# Patient Record
Sex: FEMALE | Race: WHITE | NOT HISPANIC OR LATINO | Employment: UNEMPLOYED | URBAN - METROPOLITAN AREA
[De-identification: names, ages, dates, MRNs, and addresses within clinical notes are randomized per-mention and may not be internally consistent; named-entity substitution may affect disease eponyms.]

---

## 2017-02-02 ENCOUNTER — GENERIC CONVERSION - ENCOUNTER (OUTPATIENT)
Dept: OTHER | Facility: OTHER | Age: 22
End: 2017-02-02

## 2017-02-07 ENCOUNTER — ALLSCRIPTS OFFICE VISIT (OUTPATIENT)
Dept: OTHER | Facility: OTHER | Age: 22
End: 2017-02-07

## 2017-02-07 DIAGNOSIS — Z34.90 ENCOUNTER FOR SUPERVISION OF NORMAL PREGNANCY: ICD-10-CM

## 2017-02-08 ENCOUNTER — GENERIC CONVERSION - ENCOUNTER (OUTPATIENT)
Dept: OTHER | Facility: OTHER | Age: 22
End: 2017-02-08

## 2017-02-10 LAB
CHLAMYDIA TRACHOMATIS BY MOL. METHOD (HISTORICAL): NOT DETECTED
GC BY MOL. METHOD (HISTORICAL): NOT DETECTED

## 2017-02-19 ENCOUNTER — HOSPITAL ENCOUNTER (OUTPATIENT)
Facility: HOSPITAL | Age: 22
Discharge: HOME | End: 2017-02-19
Attending: OBSTETRICS & GYNECOLOGY | Admitting: OBSTETRICS & GYNECOLOGY
Payer: COMMERCIAL

## 2017-02-19 ENCOUNTER — HOSPITAL ENCOUNTER (EMERGENCY)
Facility: HOSPITAL | Age: 22
Discharge: HOME | End: 2017-02-19
Payer: COMMERCIAL

## 2017-02-19 VITALS
SYSTOLIC BLOOD PRESSURE: 133 MMHG | RESPIRATION RATE: 20 BRPM | TEMPERATURE: 98.6 F | OXYGEN SATURATION: 99 % | DIASTOLIC BLOOD PRESSURE: 93 MMHG | HEART RATE: 112 BPM

## 2017-02-19 VITALS
OXYGEN SATURATION: 100 % | RESPIRATION RATE: 16 BRPM | TEMPERATURE: 98.6 F | HEIGHT: 61 IN | HEART RATE: 97 BPM | WEIGHT: 235.67 LBS | DIASTOLIC BLOOD PRESSURE: 58 MMHG | BODY MASS INDEX: 44.5 KG/M2 | SYSTOLIC BLOOD PRESSURE: 120 MMHG

## 2017-02-19 DIAGNOSIS — O20.9 VAGINAL BLEEDING BEFORE 22 WEEKS GESTATION: Primary | ICD-10-CM

## 2017-02-19 LAB
ABO GROUP BLD: NORMAL
BASOPHILS # BLD AUTO: 0.02 THOUSANDS/ΜL (ref 0–0.1)
BASOPHILS NFR BLD AUTO: 0 % (ref 0–1)
EOSINOPHIL # BLD AUTO: 0.23 THOUSAND/ΜL (ref 0–0.61)
EOSINOPHIL NFR BLD AUTO: 2 % (ref 0–6)
ERYTHROCYTE [DISTWIDTH] IN BLOOD BY AUTOMATED COUNT: 14.8 % (ref 11.6–15.1)
HCT VFR BLD AUTO: 36.4 % (ref 34.8–46.1)
HGB BLD-MCNC: 12.1 G/DL (ref 11.5–15.4)
LYMPHOCYTES # BLD AUTO: 1.66 THOUSANDS/ΜL (ref 0.6–4.47)
LYMPHOCYTES NFR BLD AUTO: 15 % (ref 14–44)
MCH RBC QN AUTO: 25.6 PG (ref 26.8–34.3)
MCHC RBC AUTO-ENTMCNC: 33.2 G/DL (ref 31.4–37.4)
MCV RBC AUTO: 77 FL (ref 82–98)
MONOCYTES # BLD AUTO: 0.46 THOUSAND/ΜL (ref 0.17–1.22)
MONOCYTES NFR BLD AUTO: 4 % (ref 4–12)
NEUTROPHILS # BLD AUTO: 8.91 THOUSANDS/ΜL (ref 1.85–7.62)
NEUTS SEG NFR BLD AUTO: 79 % (ref 43–75)
NRBC BLD AUTO-RTO: 0 /100 WBCS
PLATELET # BLD AUTO: 302 THOUSANDS/UL (ref 149–390)
PMV BLD AUTO: 8.8 FL (ref 8.9–12.7)
RBC # BLD AUTO: 4.73 MILLION/UL (ref 3.81–5.12)
RH BLD: POSITIVE
WBC # BLD AUTO: 11.35 THOUSAND/UL (ref 4.31–10.16)

## 2017-02-19 PROCEDURE — 86900 BLOOD TYPING SEROLOGIC ABO: CPT | Performed by: PHYSICIAN ASSISTANT

## 2017-02-19 PROCEDURE — 85025 COMPLETE CBC W/AUTO DIFF WBC: CPT | Performed by: PHYSICIAN ASSISTANT

## 2017-02-19 PROCEDURE — 86901 BLOOD TYPING SEROLOGIC RH(D): CPT | Performed by: PHYSICIAN ASSISTANT

## 2017-02-19 PROCEDURE — 99214 OFFICE O/P EST MOD 30 MIN: CPT

## 2017-02-19 PROCEDURE — 96361 HYDRATE IV INFUSION ADD-ON: CPT

## 2017-02-19 PROCEDURE — 99284 EMERGENCY DEPT VISIT MOD MDM: CPT

## 2017-02-19 PROCEDURE — 36415 COLL VENOUS BLD VENIPUNCTURE: CPT | Performed by: PHYSICIAN ASSISTANT

## 2017-02-19 PROCEDURE — 96360 HYDRATION IV INFUSION INIT: CPT

## 2017-02-19 RX ORDER — FOLIC ACID 1 MG/1
1 TABLET ORAL DAILY
Status: ON HOLD | COMMUNITY
End: 2017-06-20

## 2017-02-19 RX ADMIN — SODIUM CHLORIDE 1000 ML: 0.9 INJECTION, SOLUTION INTRAVENOUS at 10:27

## 2017-02-21 ENCOUNTER — GENERIC CONVERSION - ENCOUNTER (OUTPATIENT)
Dept: OTHER | Facility: OTHER | Age: 22
End: 2017-02-21

## 2017-02-22 ENCOUNTER — ALLSCRIPTS OFFICE VISIT (OUTPATIENT)
Dept: PERINATAL CARE | Facility: CLINIC | Age: 22
End: 2017-02-22
Payer: COMMERCIAL

## 2017-02-22 ENCOUNTER — GENERIC CONVERSION - ENCOUNTER (OUTPATIENT)
Dept: OTHER | Facility: OTHER | Age: 22
End: 2017-02-22

## 2017-02-22 PROCEDURE — 76813 OB US NUCHAL MEAS 1 GEST: CPT | Performed by: OBSTETRICS & GYNECOLOGY

## 2017-02-22 PROCEDURE — 76801 OB US < 14 WKS SINGLE FETUS: CPT | Performed by: OBSTETRICS & GYNECOLOGY

## 2017-02-22 PROCEDURE — 76814 OB US NUCHAL MEAS ADD-ON: CPT | Performed by: OBSTETRICS & GYNECOLOGY

## 2017-02-22 PROCEDURE — 76802 OB US < 14 WKS ADDL FETUS: CPT | Performed by: OBSTETRICS & GYNECOLOGY

## 2017-03-07 ENCOUNTER — ALLSCRIPTS OFFICE VISIT (OUTPATIENT)
Dept: OTHER | Facility: OTHER | Age: 22
End: 2017-03-07

## 2017-03-09 ENCOUNTER — GENERIC CONVERSION - ENCOUNTER (OUTPATIENT)
Dept: OTHER | Facility: OTHER | Age: 22
End: 2017-03-09

## 2017-03-10 ENCOUNTER — GENERIC CONVERSION - ENCOUNTER (OUTPATIENT)
Dept: OTHER | Facility: OTHER | Age: 22
End: 2017-03-10

## 2017-03-13 ENCOUNTER — GENERIC CONVERSION - ENCOUNTER (OUTPATIENT)
Dept: OTHER | Facility: OTHER | Age: 22
End: 2017-03-13

## 2017-03-15 ENCOUNTER — GENERIC CONVERSION - ENCOUNTER (OUTPATIENT)
Dept: OTHER | Facility: OTHER | Age: 22
End: 2017-03-15

## 2017-03-17 ENCOUNTER — GENERIC CONVERSION - ENCOUNTER (OUTPATIENT)
Dept: OTHER | Facility: OTHER | Age: 22
End: 2017-03-17

## 2017-03-21 DIAGNOSIS — Z86.718 PERSONAL HISTORY OF OTHER VENOUS THROMBOSIS AND EMBOLISM: ICD-10-CM

## 2017-03-21 DIAGNOSIS — Z34.90 ENCOUNTER FOR SUPERVISION OF NORMAL PREGNANCY: ICD-10-CM

## 2017-03-21 DIAGNOSIS — O14.92 PRE-ECLAMPSIA IN SECOND TRIMESTER: ICD-10-CM

## 2017-03-22 ENCOUNTER — GENERIC CONVERSION - ENCOUNTER (OUTPATIENT)
Dept: OTHER | Facility: OTHER | Age: 22
End: 2017-03-22

## 2017-04-04 ENCOUNTER — ALLSCRIPTS OFFICE VISIT (OUTPATIENT)
Dept: OTHER | Facility: OTHER | Age: 22
End: 2017-04-04

## 2017-04-06 ENCOUNTER — LAB CONVERSION - ENCOUNTER (OUTPATIENT)
Dept: OTHER | Facility: OTHER | Age: 22
End: 2017-04-06

## 2017-04-06 LAB
BASOPHILS # BLD AUTO: 0.2 %
BASOPHILS # BLD AUTO: 22 CELLS/UL (ref 0–200)
CREATININE 24 HOUR UR (HISTORICAL): 2.25 G/24 H (ref 0.63–2.5)
DEPRECATED RDW RBC AUTO: 17 % (ref 11–15)
EOSINOPHIL # BLD AUTO: 1.7 %
EOSINOPHIL # BLD AUTO: 189 CELLS/UL (ref 15–500)
HCT VFR BLD AUTO: 33.4 % (ref 35–45)
HGB BLD-MCNC: 11.1 G/DL (ref 11.7–15.5)
LYMPHOCYTES # BLD AUTO: 17.6 %
LYMPHOCYTES # BLD AUTO: 1954 CELLS/UL (ref 850–3900)
MCH RBC QN AUTO: 26.2 PG (ref 27–33)
MCHC RBC AUTO-ENTMCNC: 33.3 G/DL (ref 32–36)
MCV RBC AUTO: 78.7 FL (ref 80–100)
MONOCYTES # BLD AUTO: 366 CELLS/UL (ref 200–950)
MONOCYTES (HISTORICAL): 3.3 %
NEUTROPHILS # BLD AUTO: 77.2 %
NEUTROPHILS # BLD AUTO: 8569 CELLS/UL (ref 1500–7800)
PLATELET # BLD AUTO: 342 THOUSAND/UL (ref 140–400)
PMV BLD AUTO: 7.5 FL (ref 7.5–12.5)
PROT/CREAT UR: 100 MG/G CREAT
PROTEIN 24 HOUR URINE (HISTORICAL): 225 MG/24 H
RBC # BLD AUTO: 4.24 MILLION/UL (ref 3.8–5.1)
WBC # BLD AUTO: 11.1 THOUSAND/UL (ref 3.8–10.8)

## 2017-04-07 ENCOUNTER — LAB CONVERSION - ENCOUNTER (OUTPATIENT)
Dept: OTHER | Facility: OTHER | Age: 22
End: 2017-04-07

## 2017-04-07 LAB
A/G RATIO (HISTORICAL): 1 (CALC) (ref 1–2.5)
AB SCRN, RBC W/RFLX ID,TITER,AG (HISTORICAL): ABNORMAL
ABO GROUP BLD: ABNORMAL
ALBUMIN SERPL BCP-MCNC: 3.1 G/DL (ref 3.6–5.1)
ALP SERPL-CCNC: 94 U/L (ref 33–115)
ALT SERPL W P-5'-P-CCNC: 6 U/L (ref 6–29)
AST SERPL W P-5'-P-CCNC: 9 U/L (ref 10–30)
BASOPHILS # BLD AUTO: 0.2 %
BASOPHILS # BLD AUTO: 22 CELLS/UL (ref 0–200)
BILIRUB SERPL-MCNC: 0.2 MG/DL (ref 0.2–1.2)
BUN SERPL-MCNC: 7 MG/DL (ref 7–25)
BUN/CREA RATIO (HISTORICAL): 18 (CALC) (ref 6–22)
CALCIUM SERPL-MCNC: 8.5 MG/DL (ref 8.6–10.2)
CHLORIDE SERPL-SCNC: 105 MMOL/L (ref 98–110)
CO2 SERPL-SCNC: 24 MMOL/L (ref 20–31)
CREAT SERPL-MCNC: 0.38 MG/DL (ref 0.5–1.1)
DEPRECATED RDW RBC AUTO: 17 % (ref 11–15)
EGFR AFRICAN AMERICAN (HISTORICAL): 177 ML/MIN/1.73M2
EGFR-AMERICAN CALC (HISTORICAL): 152 ML/MIN/1.73M2
EOSINOPHIL # BLD AUTO: 1.7 %
EOSINOPHIL # BLD AUTO: 189 CELLS/UL (ref 15–500)
GAMMA GLOBULIN (HISTORICAL): 3 G/DL (CALC) (ref 1.9–3.7)
GLUCOSE (HISTORICAL): 83 MG/DL (ref 65–99)
HBA1C MFR BLD HPLC: 4.8 % OF TOTAL HGB
HCT VFR BLD AUTO: 33.4 % (ref 35–45)
HEPATITIS B SURFACE ANTIGEN (HISTORICAL): ABNORMAL
HGB BLD-MCNC: 11.1 G/DL (ref 11.7–15.5)
LYMPHOCYTES # BLD AUTO: 17.6 %
LYMPHOCYTES # BLD AUTO: 1954 CELLS/UL (ref 850–3900)
MCH RBC QN AUTO: 26.2 PG (ref 27–33)
MCHC RBC AUTO-ENTMCNC: 33.3 G/DL (ref 32–36)
MCV RBC AUTO: 78.7 FL (ref 80–100)
MONOCYTES # BLD AUTO: 366 CELLS/UL (ref 200–950)
MONOCYTES (HISTORICAL): 3.3 %
NEUTROPHILS # BLD AUTO: 77.2 %
NEUTROPHILS # BLD AUTO: 8569 CELLS/UL (ref 1500–7800)
PLATELET # BLD AUTO: 342 THOUSAND/UL (ref 140–400)
PMV BLD AUTO: 7.5 FL (ref 7.5–12.5)
POTASSIUM SERPL-SCNC: 3.7 MMOL/L (ref 3.5–5.3)
RBC # BLD AUTO: 4.24 MILLION/UL (ref 3.8–5.1)
RH BLD: ABNORMAL
RPR SCREEN (HISTORICAL): ABNORMAL
RUBELLA, IGG (HISTORICAL): 2.01 INDEX
SODIUM SERPL-SCNC: 137 MMOL/L (ref 135–146)
TOTAL PROTEIN (HISTORICAL): 6.1 G/DL (ref 6.1–8.1)
URIC ACID (HISTORICAL): 3.6 MG/DL (ref 2.5–7)
WBC # BLD AUTO: 11.1 THOUSAND/UL (ref 3.8–10.8)

## 2017-04-12 ENCOUNTER — GENERIC CONVERSION - ENCOUNTER (OUTPATIENT)
Dept: OTHER | Facility: OTHER | Age: 22
End: 2017-04-12

## 2017-04-13 ENCOUNTER — GENERIC CONVERSION - ENCOUNTER (OUTPATIENT)
Dept: OTHER | Facility: OTHER | Age: 22
End: 2017-04-13

## 2017-04-19 ENCOUNTER — GENERIC CONVERSION - ENCOUNTER (OUTPATIENT)
Dept: OTHER | Facility: OTHER | Age: 22
End: 2017-04-19

## 2017-04-19 ENCOUNTER — ALLSCRIPTS OFFICE VISIT (OUTPATIENT)
Dept: PERINATAL CARE | Facility: CLINIC | Age: 22
End: 2017-04-19
Payer: COMMERCIAL

## 2017-04-19 PROCEDURE — 76812 OB US DETAILED ADDL FETUS: CPT | Performed by: OBSTETRICS & GYNECOLOGY

## 2017-04-19 PROCEDURE — 76811 OB US DETAILED SNGL FETUS: CPT | Performed by: OBSTETRICS & GYNECOLOGY

## 2017-05-09 ENCOUNTER — GENERIC CONVERSION - ENCOUNTER (OUTPATIENT)
Dept: OTHER | Facility: OTHER | Age: 22
End: 2017-05-09

## 2017-05-23 ENCOUNTER — GENERIC CONVERSION - ENCOUNTER (OUTPATIENT)
Dept: OTHER | Facility: OTHER | Age: 22
End: 2017-05-23

## 2017-06-05 ENCOUNTER — ALLSCRIPTS OFFICE VISIT (OUTPATIENT)
Dept: OTHER | Facility: OTHER | Age: 22
End: 2017-06-05

## 2017-06-05 DIAGNOSIS — Z34.82 ENCOUNTER FOR SUPERVISION OF OTHER NORMAL PREGNANCY, SECOND TRIMESTER: ICD-10-CM

## 2017-06-13 ENCOUNTER — ALLSCRIPTS OFFICE VISIT (OUTPATIENT)
Dept: PERINATAL CARE | Facility: MEDICAL CENTER | Age: 22
End: 2017-06-13
Payer: COMMERCIAL

## 2017-06-13 ENCOUNTER — GENERIC CONVERSION - ENCOUNTER (OUTPATIENT)
Dept: OTHER | Facility: OTHER | Age: 22
End: 2017-06-13

## 2017-06-13 PROCEDURE — 96372 THER/PROPH/DIAG INJ SC/IM: CPT | Performed by: OBSTETRICS & GYNECOLOGY

## 2017-06-13 PROCEDURE — 76818 FETAL BIOPHYS PROFILE W/NST: CPT | Performed by: OBSTETRICS & GYNECOLOGY

## 2017-06-13 PROCEDURE — 76820 UMBILICAL ARTERY ECHO: CPT | Performed by: OBSTETRICS & GYNECOLOGY

## 2017-06-13 PROCEDURE — 76816 OB US FOLLOW-UP PER FETUS: CPT | Performed by: OBSTETRICS & GYNECOLOGY

## 2017-06-13 PROCEDURE — 76821 MIDDLE CEREBRAL ARTERY ECHO: CPT | Performed by: OBSTETRICS & GYNECOLOGY

## 2017-06-14 ENCOUNTER — GENERIC CONVERSION - ENCOUNTER (OUTPATIENT)
Dept: OTHER | Facility: OTHER | Age: 22
End: 2017-06-14

## 2017-06-14 ENCOUNTER — APPOINTMENT (OUTPATIENT)
Dept: PERINATAL CARE | Facility: CLINIC | Age: 22
End: 2017-06-14
Payer: COMMERCIAL

## 2017-06-14 ENCOUNTER — ALLSCRIPTS OFFICE VISIT (OUTPATIENT)
Dept: OTHER | Facility: OTHER | Age: 22
End: 2017-06-14

## 2017-06-14 PROCEDURE — 96372 THER/PROPH/DIAG INJ SC/IM: CPT | Performed by: OBSTETRICS & GYNECOLOGY

## 2017-06-16 ENCOUNTER — GENERIC CONVERSION - ENCOUNTER (OUTPATIENT)
Dept: OTHER | Facility: OTHER | Age: 22
End: 2017-06-16

## 2017-06-16 ENCOUNTER — ALLSCRIPTS OFFICE VISIT (OUTPATIENT)
Dept: PERINATAL CARE | Facility: MEDICAL CENTER | Age: 22
End: 2017-06-16
Payer: COMMERCIAL

## 2017-06-16 PROCEDURE — 76820 UMBILICAL ARTERY ECHO: CPT | Performed by: OBSTETRICS & GYNECOLOGY

## 2017-06-16 PROCEDURE — 76821 MIDDLE CEREBRAL ARTERY ECHO: CPT | Performed by: OBSTETRICS & GYNECOLOGY

## 2017-06-16 PROCEDURE — 76819 FETAL BIOPHYS PROFIL W/O NST: CPT | Performed by: OBSTETRICS & GYNECOLOGY

## 2017-06-20 ENCOUNTER — ALLSCRIPTS OFFICE VISIT (OUTPATIENT)
Dept: PERINATAL CARE | Facility: MEDICAL CENTER | Age: 22
DRG: 540 | End: 2017-06-20
Payer: COMMERCIAL

## 2017-06-20 ENCOUNTER — GENERIC CONVERSION - ENCOUNTER (OUTPATIENT)
Dept: OTHER | Facility: OTHER | Age: 22
End: 2017-06-20

## 2017-06-20 ENCOUNTER — HOSPITAL ENCOUNTER (INPATIENT)
Facility: HOSPITAL | Age: 22
LOS: 6 days | Discharge: LEFT BEFORE DISCHARGE | DRG: 540 | End: 2017-06-26
Attending: OBSTETRICS & GYNECOLOGY | Admitting: OBSTETRICS & GYNECOLOGY
Payer: COMMERCIAL

## 2017-06-20 DIAGNOSIS — O14.12 PREECLAMPSIA, SEVERE, SECOND TRIMESTER: ICD-10-CM

## 2017-06-20 DIAGNOSIS — Z3A.29 29 WEEKS GESTATION OF PREGNANCY: Primary | ICD-10-CM

## 2017-06-20 DIAGNOSIS — O16.9 ELEVATED BLOOD PRESSURE AFFECTING PREGNANCY, ANTEPARTUM: ICD-10-CM

## 2017-06-20 DIAGNOSIS — I10 CHRONIC HYPERTENSION: ICD-10-CM

## 2017-06-20 DIAGNOSIS — IMO0001 TWINS: ICD-10-CM

## 2017-06-20 LAB
ABO GROUP BLD: NORMAL
ALBUMIN SERPL BCP-MCNC: 1.7 G/DL (ref 3.5–5)
ALP SERPL-CCNC: 111 U/L (ref 46–116)
ALT SERPL W P-5'-P-CCNC: 10 U/L (ref 12–78)
ANION GAP SERPL CALCULATED.3IONS-SCNC: 10 MMOL/L (ref 4–13)
APTT PPP: 26 SECONDS (ref 23–35)
AST SERPL W P-5'-P-CCNC: 15 U/L (ref 5–45)
BACTERIA UR QL AUTO: ABNORMAL /HPF
BASOPHILS # BLD AUTO: 0.01 THOUSANDS/ΜL (ref 0–0.1)
BASOPHILS NFR BLD AUTO: 0 % (ref 0–1)
BILIRUB SERPL-MCNC: 0.15 MG/DL (ref 0.2–1)
BILIRUB UR QL STRIP: NEGATIVE
BLD GP AB SCN SERPL QL: NEGATIVE
BUN SERPL-MCNC: 8 MG/DL (ref 5–25)
CALCIUM SERPL-MCNC: 8.1 MG/DL (ref 8.3–10.1)
CHLORIDE SERPL-SCNC: 108 MMOL/L (ref 100–108)
CLARITY UR: CLEAR
CO2 SERPL-SCNC: 24 MMOL/L (ref 21–32)
COLOR UR: YELLOW
CREAT SERPL-MCNC: 0.43 MG/DL (ref 0.6–1.3)
CREAT UR-MCNC: 115 MG/DL
EOSINOPHIL # BLD AUTO: 0.17 THOUSAND/ΜL (ref 0–0.61)
EOSINOPHIL NFR BLD AUTO: 2 % (ref 0–6)
ERYTHROCYTE [DISTWIDTH] IN BLOOD BY AUTOMATED COUNT: 14.8 % (ref 11.6–15.1)
GFR SERPL CREATININE-BSD FRML MDRD: >60 ML/MIN/1.73SQ M
GLUCOSE SERPL-MCNC: 95 MG/DL (ref 65–140)
GLUCOSE UR STRIP-MCNC: NEGATIVE MG/DL
HCT VFR BLD AUTO: 31.7 % (ref 34.8–46.1)
HGB BLD-MCNC: 10.3 G/DL (ref 11.5–15.4)
HGB UR QL STRIP.AUTO: ABNORMAL
HYALINE CASTS #/AREA URNS LPF: ABNORMAL /LPF
INR PPP: 0.94 (ref 0.86–1.16)
KETONES UR STRIP-MCNC: NEGATIVE MG/DL
LEUKOCYTE ESTERASE UR QL STRIP: NEGATIVE
LYMPHOCYTES # BLD AUTO: 1.79 THOUSANDS/ΜL (ref 0.6–4.47)
LYMPHOCYTES NFR BLD AUTO: 16 % (ref 14–44)
MAGNESIUM SERPL-MCNC: 2.1 MG/DL (ref 1.6–2.6)
MCH RBC QN AUTO: 24.9 PG (ref 26.8–34.3)
MCHC RBC AUTO-ENTMCNC: 32.5 G/DL (ref 31.4–37.4)
MCV RBC AUTO: 77 FL (ref 82–98)
MONOCYTES # BLD AUTO: 0.59 THOUSAND/ΜL (ref 0.17–1.22)
MONOCYTES NFR BLD AUTO: 5 % (ref 4–12)
NEUTROPHILS # BLD AUTO: 8.53 THOUSANDS/ΜL (ref 1.85–7.62)
NEUTS SEG NFR BLD AUTO: 77 % (ref 43–75)
NITRITE UR QL STRIP: NEGATIVE
NON-SQ EPI CELLS URNS QL MICRO: ABNORMAL /HPF
NRBC BLD AUTO-RTO: 0 /100 WBCS
PH UR STRIP.AUTO: 6 [PH] (ref 4.5–8)
PLATELET # BLD AUTO: 280 THOUSANDS/UL (ref 149–390)
PMV BLD AUTO: 9.8 FL (ref 8.9–12.7)
POTASSIUM SERPL-SCNC: 3.5 MMOL/L (ref 3.5–5.3)
PROT SERPL-MCNC: 5.3 G/DL (ref 6.4–8.2)
PROT UR STRIP-MCNC: ABNORMAL MG/DL
PROT UR-MCNC: 823 MG/DL
PROT/CREAT UR: 7.16 MG/G{CREAT} (ref 0–0.1)
PROTHROMBIN TIME: 12.6 SECONDS (ref 12.1–14.4)
RBC # BLD AUTO: 4.13 MILLION/UL (ref 3.81–5.12)
RBC #/AREA URNS AUTO: ABNORMAL /HPF
RH BLD: POSITIVE
SODIUM SERPL-SCNC: 142 MMOL/L (ref 136–145)
SP GR UR STRIP.AUTO: 1.02 (ref 1–1.03)
SPECIMEN EXPIRATION DATE: NORMAL
URATE SERPL-MCNC: 5.9 MG/DL (ref 2–6.8)
UROBILINOGEN UR QL STRIP.AUTO: 0.2 E.U./DL
WBC # BLD AUTO: 11.18 THOUSAND/UL (ref 4.31–10.16)
WBC #/AREA URNS AUTO: ABNORMAL /HPF

## 2017-06-20 PROCEDURE — 85730 THROMBOPLASTIN TIME PARTIAL: CPT | Performed by: OBSTETRICS & GYNECOLOGY

## 2017-06-20 PROCEDURE — 76821 MIDDLE CEREBRAL ARTERY ECHO: CPT | Performed by: OBSTETRICS & GYNECOLOGY

## 2017-06-20 PROCEDURE — 81001 URINALYSIS AUTO W/SCOPE: CPT | Performed by: OBSTETRICS & GYNECOLOGY

## 2017-06-20 PROCEDURE — 86901 BLOOD TYPING SEROLOGIC RH(D): CPT | Performed by: OBSTETRICS & GYNECOLOGY

## 2017-06-20 PROCEDURE — 86850 RBC ANTIBODY SCREEN: CPT | Performed by: OBSTETRICS & GYNECOLOGY

## 2017-06-20 PROCEDURE — 86592 SYPHILIS TEST NON-TREP QUAL: CPT | Performed by: OBSTETRICS & GYNECOLOGY

## 2017-06-20 PROCEDURE — 76820 UMBILICAL ARTERY ECHO: CPT | Performed by: OBSTETRICS & GYNECOLOGY

## 2017-06-20 PROCEDURE — 76818 FETAL BIOPHYS PROFILE W/NST: CPT | Performed by: OBSTETRICS & GYNECOLOGY

## 2017-06-20 PROCEDURE — 99203 OFFICE O/P NEW LOW 30 MIN: CPT

## 2017-06-20 PROCEDURE — 86900 BLOOD TYPING SEROLOGIC ABO: CPT | Performed by: OBSTETRICS & GYNECOLOGY

## 2017-06-20 PROCEDURE — 84156 ASSAY OF PROTEIN URINE: CPT | Performed by: OBSTETRICS & GYNECOLOGY

## 2017-06-20 PROCEDURE — 82570 ASSAY OF URINE CREATININE: CPT | Performed by: OBSTETRICS & GYNECOLOGY

## 2017-06-20 PROCEDURE — 80053 COMPREHEN METABOLIC PANEL: CPT | Performed by: OBSTETRICS & GYNECOLOGY

## 2017-06-20 PROCEDURE — 84550 ASSAY OF BLOOD/URIC ACID: CPT | Performed by: OBSTETRICS & GYNECOLOGY

## 2017-06-20 PROCEDURE — 83735 ASSAY OF MAGNESIUM: CPT | Performed by: OBSTETRICS & GYNECOLOGY

## 2017-06-20 PROCEDURE — 85025 COMPLETE CBC W/AUTO DIFF WBC: CPT | Performed by: OBSTETRICS & GYNECOLOGY

## 2017-06-20 PROCEDURE — 85610 PROTHROMBIN TIME: CPT | Performed by: OBSTETRICS & GYNECOLOGY

## 2017-06-20 RX ORDER — LABETALOL HYDROCHLORIDE 5 MG/ML
20 INJECTION, SOLUTION INTRAVENOUS ONCE
Status: COMPLETED | OUTPATIENT
Start: 2017-06-20 | End: 2017-06-20

## 2017-06-20 RX ORDER — NIFEDIPINE 10 MG/1
20 CAPSULE ORAL ONCE
Status: DISCONTINUED | OUTPATIENT
Start: 2017-06-20 | End: 2017-06-20

## 2017-06-20 RX ORDER — SODIUM CHLORIDE, SODIUM LACTATE, POTASSIUM CHLORIDE, CALCIUM CHLORIDE 600; 310; 30; 20 MG/100ML; MG/100ML; MG/100ML; MG/100ML
25 INJECTION, SOLUTION INTRAVENOUS CONTINUOUS
Status: DISCONTINUED | OUTPATIENT
Start: 2017-06-20 | End: 2017-06-22

## 2017-06-20 RX ORDER — ONDANSETRON 2 MG/ML
4 INJECTION INTRAMUSCULAR; INTRAVENOUS EVERY 8 HOURS PRN
Status: DISCONTINUED | OUTPATIENT
Start: 2017-06-20 | End: 2017-06-20

## 2017-06-20 RX ORDER — LABETALOL 200 MG/1
200 TABLET, FILM COATED ORAL EVERY 12 HOURS SCHEDULED
Status: DISCONTINUED | OUTPATIENT
Start: 2017-06-20 | End: 2017-06-24 | Stop reason: SDUPTHER

## 2017-06-20 RX ORDER — HEPARIN SODIUM 5000 [USP'U]/ML
5000 INJECTION, SOLUTION INTRAVENOUS; SUBCUTANEOUS EVERY 12 HOURS SCHEDULED
Status: DISCONTINUED | OUTPATIENT
Start: 2017-06-20 | End: 2017-06-25

## 2017-06-20 RX ORDER — BETAMETHASONE SODIUM PHOSPHATE AND BETAMETHASONE ACETATE 3; 3 MG/ML; MG/ML
12 INJECTION, SUSPENSION INTRA-ARTICULAR; INTRALESIONAL; INTRAMUSCULAR; SOFT TISSUE EVERY 24 HOURS
Status: DISCONTINUED | OUTPATIENT
Start: 2017-06-20 | End: 2017-06-20

## 2017-06-20 RX ORDER — PNV 119/IRON FUM/FOLIC ACID 29 MG-1 MG
1 TABLET ORAL DAILY
COMMUNITY
Start: 2017-03-07 | End: 2019-08-14 | Stop reason: ALTCHOICE

## 2017-06-20 RX ORDER — FOLIC ACID 1 MG/1
1 TABLET ORAL DAILY
COMMUNITY
Start: 2017-02-07 | End: 2018-06-20 | Stop reason: ALTCHOICE

## 2017-06-20 RX ORDER — ASPIRIN 81 MG/1
81 TABLET, CHEWABLE ORAL DAILY
COMMUNITY
Start: 2017-03-07 | End: 2018-03-13 | Stop reason: SDUPTHER

## 2017-06-20 RX ORDER — LABETALOL HYDROCHLORIDE 5 MG/ML
INJECTION, SOLUTION INTRAVENOUS
Status: DISPENSED
Start: 2017-06-20 | End: 2017-06-21

## 2017-06-20 RX ORDER — LABETALOL HYDROCHLORIDE 5 MG/ML
40 INJECTION, SOLUTION INTRAVENOUS ONCE
Status: COMPLETED | OUTPATIENT
Start: 2017-06-20 | End: 2017-06-20

## 2017-06-20 RX ORDER — BETAMETHASONE SODIUM PHOSPHATE AND BETAMETHASONE ACETATE 3; 3 MG/ML; MG/ML
12 INJECTION, SUSPENSION INTRA-ARTICULAR; INTRALESIONAL; INTRAMUSCULAR; SOFT TISSUE EVERY 24 HOURS
Status: DISCONTINUED | OUTPATIENT
Start: 2017-06-21 | End: 2017-06-20

## 2017-06-20 RX ORDER — FERROUS SULFATE 325(65) MG
325 TABLET ORAL DAILY
COMMUNITY
Start: 2017-03-07 | End: 2018-06-20 | Stop reason: ALTCHOICE

## 2017-06-20 RX ORDER — ONDANSETRON 2 MG/ML
4 INJECTION INTRAMUSCULAR; INTRAVENOUS EVERY 6 HOURS PRN
Status: DISCONTINUED | OUTPATIENT
Start: 2017-06-20 | End: 2017-06-24

## 2017-06-20 RX ORDER — MAGNESIUM SULFATE IN WATER 40 MG/ML
6 INJECTION, SOLUTION INTRAVENOUS ONCE
Status: COMPLETED | OUTPATIENT
Start: 2017-06-20 | End: 2017-06-20

## 2017-06-20 RX ORDER — BETAMETHASONE SODIUM PHOSPHATE AND BETAMETHASONE ACETATE 3; 3 MG/ML; MG/ML
12 INJECTION, SUSPENSION INTRA-ARTICULAR; INTRALESIONAL; INTRAMUSCULAR; SOFT TISSUE EVERY 24 HOURS
Status: COMPLETED | OUTPATIENT
Start: 2017-06-20 | End: 2017-06-20

## 2017-06-20 RX ADMIN — LABETALOL HYDROCHLORIDE 20 MG: 5 INJECTION, SOLUTION INTRAVENOUS at 14:03

## 2017-06-20 RX ADMIN — LABETALOL HYDROCHLORIDE 40 MG: 5 INJECTION, SOLUTION INTRAVENOUS at 16:35

## 2017-06-20 RX ADMIN — BETAMETHASONE SODIUM PHOSPHATE AND BETAMETHASONE ACETATE 12 MG: 3; 3 INJECTION, SUSPENSION INTRA-ARTICULAR; INTRALESIONAL; INTRAMUSCULAR at 23:31

## 2017-06-20 RX ADMIN — LABETALOL HYDROCHLORIDE 40 MG: 5 INJECTION, SOLUTION INTRAVENOUS at 14:34

## 2017-06-20 RX ADMIN — LABETALOL HCL 200 MG: 200 TABLET, FILM COATED ORAL at 16:45

## 2017-06-20 RX ADMIN — SODIUM CHLORIDE, SODIUM LACTATE, POTASSIUM CHLORIDE, AND CALCIUM CHLORIDE 125 ML/HR: .6; .31; .03; .02 INJECTION, SOLUTION INTRAVENOUS at 14:20

## 2017-06-20 RX ADMIN — Medication 6 G: at 14:27

## 2017-06-20 RX ADMIN — MAGNESIUM SULFATE HEPTAHYDRATE 2 G/HR: 500 INJECTION, SOLUTION INTRAMUSCULAR; INTRAVENOUS at 15:37

## 2017-06-20 RX ADMIN — HEPARIN SODIUM 5000 UNITS: 5000 INJECTION, SOLUTION INTRAVENOUS; SUBCUTANEOUS at 23:30

## 2017-06-20 RX ADMIN — LABETALOL HYDROCHLORIDE 20 MG: 5 INJECTION, SOLUTION INTRAVENOUS at 16:07

## 2017-06-21 LAB
ALBUMIN SERPL BCP-MCNC: 1.7 G/DL (ref 3.5–5)
ALBUMIN SERPL BCP-MCNC: 1.8 G/DL (ref 3.5–5)
ALP SERPL-CCNC: 112 U/L (ref 46–116)
ALP SERPL-CCNC: 121 U/L (ref 46–116)
ALT SERPL W P-5'-P-CCNC: 8 U/L (ref 12–78)
ALT SERPL W P-5'-P-CCNC: 9 U/L (ref 12–78)
ANION GAP SERPL CALCULATED.3IONS-SCNC: 10 MMOL/L (ref 4–13)
ANION GAP SERPL CALCULATED.3IONS-SCNC: 12 MMOL/L (ref 4–13)
ANISOCYTOSIS BLD QL SMEAR: PRESENT
AST SERPL W P-5'-P-CCNC: 11 U/L (ref 5–45)
AST SERPL W P-5'-P-CCNC: 13 U/L (ref 5–45)
BASOPHILS # BLD AUTO: 0 THOUSANDS/ΜL (ref 0–0.1)
BASOPHILS # BLD AUTO: 0.01 THOUSANDS/ΜL (ref 0–0.1)
BASOPHILS # BLD MANUAL: 0.13 THOUSAND/UL (ref 0–0.1)
BASOPHILS NFR BLD AUTO: 0 % (ref 0–1)
BASOPHILS NFR BLD AUTO: 0 % (ref 0–1)
BASOPHILS NFR MAR MANUAL: 1 % (ref 0–1)
BILIRUB SERPL-MCNC: 0.21 MG/DL (ref 0.2–1)
BILIRUB SERPL-MCNC: 0.3 MG/DL (ref 0.2–1)
BUN SERPL-MCNC: 7 MG/DL (ref 5–25)
BUN SERPL-MCNC: 8 MG/DL (ref 5–25)
CALCIUM SERPL-MCNC: 7.3 MG/DL (ref 8.3–10.1)
CALCIUM SERPL-MCNC: 7.7 MG/DL (ref 8.3–10.1)
CHLORIDE SERPL-SCNC: 104 MMOL/L (ref 100–108)
CHLORIDE SERPL-SCNC: 104 MMOL/L (ref 100–108)
CO2 SERPL-SCNC: 22 MMOL/L (ref 21–32)
CO2 SERPL-SCNC: 24 MMOL/L (ref 21–32)
CREAT SERPL-MCNC: 0.52 MG/DL (ref 0.6–1.3)
CREAT SERPL-MCNC: 0.57 MG/DL (ref 0.6–1.3)
DEPRECATED AT III PPP: 99 % OF NORMAL (ref 92–136)
EOSINOPHIL # BLD AUTO: 0 THOUSAND/ΜL (ref 0–0.61)
EOSINOPHIL # BLD AUTO: 0 THOUSAND/ΜL (ref 0–0.61)
EOSINOPHIL # BLD MANUAL: 0 THOUSAND/UL (ref 0–0.4)
EOSINOPHIL NFR BLD AUTO: 0 % (ref 0–6)
EOSINOPHIL NFR BLD AUTO: 0 % (ref 0–6)
EOSINOPHIL NFR BLD MANUAL: 0 % (ref 0–6)
ERYTHROCYTE [DISTWIDTH] IN BLOOD BY AUTOMATED COUNT: 15.1 % (ref 11.6–15.1)
ERYTHROCYTE [DISTWIDTH] IN BLOOD BY AUTOMATED COUNT: 15.2 % (ref 11.6–15.1)
ERYTHROCYTE [DISTWIDTH] IN BLOOD BY AUTOMATED COUNT: 15.3 % (ref 11.6–15.1)
EST. AVERAGE GLUCOSE BLD GHB EST-MCNC: 97 MG/DL
GFR SERPL CREATININE-BSD FRML MDRD: >60 ML/MIN/1.73SQ M
GFR SERPL CREATININE-BSD FRML MDRD: >60 ML/MIN/1.73SQ M
GLUCOSE SERPL-MCNC: 103 MG/DL (ref 65–140)
GLUCOSE SERPL-MCNC: 124 MG/DL (ref 65–140)
HBA1C MFR BLD: 5 % (ref 4.2–6.3)
HCT VFR BLD AUTO: 30.4 % (ref 34.8–46.1)
HCT VFR BLD AUTO: 32.3 % (ref 34.8–46.1)
HCT VFR BLD AUTO: 34.1 % (ref 34.8–46.1)
HGB BLD-MCNC: 10.4 G/DL (ref 11.5–15.4)
HGB BLD-MCNC: 11 G/DL (ref 11.5–15.4)
HGB BLD-MCNC: 9.9 G/DL (ref 11.5–15.4)
LYMPHOCYTES # BLD AUTO: 0.39 THOUSAND/UL (ref 0.6–4.47)
LYMPHOCYTES # BLD AUTO: 1.05 THOUSANDS/ΜL (ref 0.6–4.47)
LYMPHOCYTES # BLD AUTO: 1.34 THOUSANDS/ΜL (ref 0.6–4.47)
LYMPHOCYTES # BLD AUTO: 3 % (ref 14–44)
LYMPHOCYTES NFR BLD AUTO: 10 % (ref 14–44)
LYMPHOCYTES NFR BLD AUTO: 11 % (ref 14–44)
MCH RBC QN AUTO: 24.7 PG (ref 26.8–34.3)
MCH RBC QN AUTO: 24.9 PG (ref 26.8–34.3)
MCH RBC QN AUTO: 24.9 PG (ref 26.8–34.3)
MCHC RBC AUTO-ENTMCNC: 32.2 G/DL (ref 31.4–37.4)
MCHC RBC AUTO-ENTMCNC: 32.3 G/DL (ref 31.4–37.4)
MCHC RBC AUTO-ENTMCNC: 32.6 G/DL (ref 31.4–37.4)
MCV RBC AUTO: 77 FL (ref 82–98)
MONOCYTES # BLD AUTO: 0 THOUSAND/UL (ref 0–1.22)
MONOCYTES # BLD AUTO: 0.13 THOUSAND/ΜL (ref 0.17–1.22)
MONOCYTES # BLD AUTO: 0.53 THOUSAND/ΜL (ref 0.17–1.22)
MONOCYTES NFR BLD AUTO: 1 % (ref 4–12)
MONOCYTES NFR BLD AUTO: 5 % (ref 4–12)
MONOCYTES NFR BLD: 0 % (ref 4–12)
MYELOCYTES NFR BLD MANUAL: 1 % (ref 0–1)
NEUTROPHILS # BLD AUTO: 9.54 THOUSANDS/ΜL (ref 1.85–7.62)
NEUTROPHILS # BLD AUTO: 9.84 THOUSANDS/ΜL (ref 1.85–7.62)
NEUTROPHILS # BLD MANUAL: 12.45 THOUSAND/UL (ref 1.85–7.62)
NEUTS SEG NFR BLD AUTO: 84 % (ref 43–75)
NEUTS SEG NFR BLD AUTO: 89 % (ref 43–75)
NEUTS SEG NFR BLD AUTO: 95 % (ref 43–75)
NRBC BLD AUTO-RTO: 0 /100 WBCS
OVALOCYTES BLD QL SMEAR: PRESENT
PLATELET # BLD AUTO: 314 THOUSANDS/UL (ref 149–390)
PLATELET # BLD AUTO: 325 THOUSANDS/UL (ref 149–390)
PLATELET # BLD AUTO: 325 THOUSANDS/UL (ref 149–390)
PLATELET BLD QL SMEAR: ADEQUATE
PMV BLD AUTO: 10.2 FL (ref 8.9–12.7)
PMV BLD AUTO: 9.7 FL (ref 8.9–12.7)
PMV BLD AUTO: 9.9 FL (ref 8.9–12.7)
POIKILOCYTOSIS BLD QL SMEAR: PRESENT
POTASSIUM SERPL-SCNC: 3.7 MMOL/L (ref 3.5–5.3)
POTASSIUM SERPL-SCNC: 3.9 MMOL/L (ref 3.5–5.3)
PROT SERPL-MCNC: 5.5 G/DL (ref 6.4–8.2)
PROT SERPL-MCNC: 5.9 G/DL (ref 6.4–8.2)
RBC # BLD AUTO: 3.97 MILLION/UL (ref 3.81–5.12)
RBC # BLD AUTO: 4.21 MILLION/UL (ref 3.81–5.12)
RBC # BLD AUTO: 4.42 MILLION/UL (ref 3.81–5.12)
RBC MORPH BLD: PRESENT
RPR SER QL: NORMAL
SODIUM SERPL-SCNC: 138 MMOL/L (ref 136–145)
SODIUM SERPL-SCNC: 138 MMOL/L (ref 136–145)
WBC # BLD AUTO: 10.78 THOUSAND/UL (ref 4.31–10.16)
WBC # BLD AUTO: 11.82 THOUSAND/UL (ref 4.31–10.16)
WBC # BLD AUTO: 13.11 THOUSAND/UL (ref 4.31–10.16)

## 2017-06-21 PROCEDURE — 81240 F2 GENE: CPT | Performed by: OBSTETRICS & GYNECOLOGY

## 2017-06-21 PROCEDURE — 85306 CLOT INHIBIT PROT S FREE: CPT | Performed by: OBSTETRICS & GYNECOLOGY

## 2017-06-21 PROCEDURE — 85007 BL SMEAR W/DIFF WBC COUNT: CPT | Performed by: OBSTETRICS & GYNECOLOGY

## 2017-06-21 PROCEDURE — 85025 COMPLETE CBC W/AUTO DIFF WBC: CPT | Performed by: OBSTETRICS & GYNECOLOGY

## 2017-06-21 PROCEDURE — 83036 HEMOGLOBIN GLYCOSYLATED A1C: CPT | Performed by: OBSTETRICS & GYNECOLOGY

## 2017-06-21 PROCEDURE — 85300 ANTITHROMBIN III ACTIVITY: CPT | Performed by: OBSTETRICS & GYNECOLOGY

## 2017-06-21 PROCEDURE — 80053 COMPREHEN METABOLIC PANEL: CPT | Performed by: OBSTETRICS & GYNECOLOGY

## 2017-06-21 PROCEDURE — 85027 COMPLETE CBC AUTOMATED: CPT | Performed by: OBSTETRICS & GYNECOLOGY

## 2017-06-21 PROCEDURE — 85705 THROMBOPLASTIN INHIBITION: CPT | Performed by: OBSTETRICS & GYNECOLOGY

## 2017-06-21 PROCEDURE — 85732 THROMBOPLASTIN TIME PARTIAL: CPT | Performed by: OBSTETRICS & GYNECOLOGY

## 2017-06-21 PROCEDURE — 81241 F5 GENE: CPT | Performed by: OBSTETRICS & GYNECOLOGY

## 2017-06-21 PROCEDURE — 85613 RUSSELL VIPER VENOM DILUTED: CPT | Performed by: OBSTETRICS & GYNECOLOGY

## 2017-06-21 PROCEDURE — 86146 BETA-2 GLYCOPROTEIN ANTIBODY: CPT | Performed by: OBSTETRICS & GYNECOLOGY

## 2017-06-21 PROCEDURE — 86147 CARDIOLIPIN ANTIBODY EA IG: CPT | Performed by: OBSTETRICS & GYNECOLOGY

## 2017-06-21 PROCEDURE — 85305 CLOT INHIBIT PROT S TOTAL: CPT | Performed by: OBSTETRICS & GYNECOLOGY

## 2017-06-21 PROCEDURE — 85670 THROMBIN TIME PLASMA: CPT | Performed by: OBSTETRICS & GYNECOLOGY

## 2017-06-21 PROCEDURE — 85303 CLOT INHIBIT PROT C ACTIVITY: CPT | Performed by: OBSTETRICS & GYNECOLOGY

## 2017-06-21 RX ORDER — DIPHENHYDRAMINE HCL 25 MG
50 TABLET ORAL
Status: DISCONTINUED | OUTPATIENT
Start: 2017-06-21 | End: 2017-06-26 | Stop reason: HOSPADM

## 2017-06-21 RX ORDER — ACETAMINOPHEN 325 MG/1
650 TABLET ORAL EVERY 6 HOURS PRN
Status: DISCONTINUED | OUTPATIENT
Start: 2017-06-21 | End: 2017-06-26 | Stop reason: HOSPADM

## 2017-06-21 RX ORDER — DIPHENHYDRAMINE HCL 25 MG
TABLET ORAL
Status: COMPLETED
Start: 2017-06-21 | End: 2017-06-21

## 2017-06-21 RX ORDER — FERROUS SULFATE 325(65) MG
325 TABLET ORAL
Status: DISCONTINUED | OUTPATIENT
Start: 2017-06-22 | End: 2017-06-26 | Stop reason: HOSPADM

## 2017-06-21 RX ORDER — DOCUSATE SODIUM 100 MG/1
100 CAPSULE, LIQUID FILLED ORAL 2 TIMES DAILY
Status: DISCONTINUED | OUTPATIENT
Start: 2017-06-21 | End: 2017-06-26 | Stop reason: HOSPADM

## 2017-06-21 RX ORDER — BETAMETHASONE SODIUM PHOSPHATE AND BETAMETHASONE ACETATE 3; 3 MG/ML; MG/ML
12 INJECTION, SUSPENSION INTRA-ARTICULAR; INTRALESIONAL; INTRAMUSCULAR; SOFT TISSUE ONCE
Status: COMPLETED | OUTPATIENT
Start: 2017-06-21 | End: 2017-06-21

## 2017-06-21 RX ADMIN — MAGNESIUM SULFATE HEPTAHYDRATE 2 G/HR: 500 INJECTION, SOLUTION INTRAMUSCULAR; INTRAVENOUS at 12:49

## 2017-06-21 RX ADMIN — DOCUSATE SODIUM 100 MG: 100 CAPSULE, LIQUID FILLED ORAL at 22:28

## 2017-06-21 RX ADMIN — HEPARIN SODIUM 5000 UNITS: 5000 INJECTION, SOLUTION INTRAVENOUS; SUBCUTANEOUS at 22:29

## 2017-06-21 RX ADMIN — BETAMETHASONE SODIUM PHOSPHATE AND BETAMETHASONE ACETATE 12 MG: 3; 3 INJECTION, SUSPENSION INTRA-ARTICULAR; INTRALESIONAL; INTRAMUSCULAR at 22:29

## 2017-06-21 RX ADMIN — HEPARIN SODIUM 5000 UNITS: 5000 INJECTION, SOLUTION INTRAVENOUS; SUBCUTANEOUS at 09:36

## 2017-06-21 RX ADMIN — DIPHENHYDRAMINE HCL 50 MG: 25 TABLET ORAL at 00:36

## 2017-06-21 RX ADMIN — ACETAMINOPHEN 650 MG: 325 TABLET, FILM COATED ORAL at 03:20

## 2017-06-21 RX ADMIN — LABETALOL HCL 200 MG: 200 TABLET, FILM COATED ORAL at 22:29

## 2017-06-21 RX ADMIN — LABETALOL HCL 200 MG: 200 TABLET, FILM COATED ORAL at 09:32

## 2017-06-21 RX ADMIN — Medication 50 MG: at 00:36

## 2017-06-22 ENCOUNTER — GENERIC CONVERSION - ENCOUNTER (OUTPATIENT)
Dept: OTHER | Facility: OTHER | Age: 22
End: 2017-06-22

## 2017-06-22 ENCOUNTER — ANESTHESIA EVENT (OUTPATIENT)
Dept: OTHER | Facility: OTHER | Age: 22
End: 2017-06-22

## 2017-06-22 LAB
ALBUMIN SERPL BCP-MCNC: 1.8 G/DL (ref 3.5–5)
ALP SERPL-CCNC: 120 U/L (ref 46–116)
ALT SERPL W P-5'-P-CCNC: 9 U/L (ref 12–78)
ANION GAP SERPL CALCULATED.3IONS-SCNC: 8 MMOL/L (ref 4–13)
AST SERPL W P-5'-P-CCNC: 14 U/L (ref 5–45)
BASOPHILS # BLD AUTO: 0.01 THOUSANDS/ΜL (ref 0–0.1)
BASOPHILS NFR BLD AUTO: 0 % (ref 0–1)
BILIRUB SERPL-MCNC: 0.27 MG/DL (ref 0.2–1)
BUN SERPL-MCNC: 11 MG/DL (ref 5–25)
CALCIUM SERPL-MCNC: 7.7 MG/DL (ref 8.3–10.1)
CARDIOLIPIN IGA SER IA-ACNC: <9 APL U/ML (ref 0–11)
CARDIOLIPIN IGG SER IA-ACNC: <9 GPL U/ML (ref 0–14)
CARDIOLIPIN IGM SER IA-ACNC: <9 MPL U/ML (ref 0–12)
CHLORIDE SERPL-SCNC: 105 MMOL/L (ref 100–108)
CO2 SERPL-SCNC: 24 MMOL/L (ref 21–32)
CREAT SERPL-MCNC: 0.45 MG/DL (ref 0.6–1.3)
EOSINOPHIL # BLD AUTO: 0.01 THOUSAND/ΜL (ref 0–0.61)
EOSINOPHIL NFR BLD AUTO: 0 % (ref 0–6)
ERYTHROCYTE [DISTWIDTH] IN BLOOD BY AUTOMATED COUNT: 15.4 % (ref 11.6–15.1)
GFR SERPL CREATININE-BSD FRML MDRD: >60 ML/MIN/1.73SQ M
GLUCOSE SERPL-MCNC: 104 MG/DL (ref 65–140)
HCT VFR BLD AUTO: 31.8 % (ref 34.8–46.1)
HGB BLD-MCNC: 10.1 G/DL (ref 11.5–15.4)
LYMPHOCYTES # BLD AUTO: 1.06 THOUSANDS/ΜL (ref 0.6–4.47)
LYMPHOCYTES NFR BLD AUTO: 9 % (ref 14–44)
MCH RBC QN AUTO: 24.6 PG (ref 26.8–34.3)
MCHC RBC AUTO-ENTMCNC: 31.8 G/DL (ref 31.4–37.4)
MCV RBC AUTO: 78 FL (ref 82–98)
MONOCYTES # BLD AUTO: 0.19 THOUSAND/ΜL (ref 0.17–1.22)
MONOCYTES NFR BLD AUTO: 2 % (ref 4–12)
NEUTROPHILS # BLD AUTO: 10.77 THOUSANDS/ΜL (ref 1.85–7.62)
NEUTS SEG NFR BLD AUTO: 89 % (ref 43–75)
NRBC BLD AUTO-RTO: 0 /100 WBCS
PLATELET # BLD AUTO: 336 THOUSANDS/UL (ref 149–390)
PMV BLD AUTO: 9.9 FL (ref 8.9–12.7)
POTASSIUM SERPL-SCNC: 4.2 MMOL/L (ref 3.5–5.3)
PROT 24H UR-MCNC: 9002 MG/24 HRS (ref 40–150)
PROT C AG ACT/NOR PPP IA: 70 % OF NORMAL (ref 60–150)
PROT SERPL-MCNC: 5.9 G/DL (ref 6.4–8.2)
RBC # BLD AUTO: 4.1 MILLION/UL (ref 3.81–5.12)
SODIUM SERPL-SCNC: 137 MMOL/L (ref 136–145)
SPECIMEN VOL UR: 1400 ML
WBC # BLD AUTO: 12.18 THOUSAND/UL (ref 4.31–10.16)

## 2017-06-22 PROCEDURE — 76821 MIDDLE CEREBRAL ARTERY ECHO: CPT | Performed by: OBSTETRICS & GYNECOLOGY

## 2017-06-22 PROCEDURE — 76827 ECHO EXAM OF FETAL HEART: CPT | Performed by: OBSTETRICS & GYNECOLOGY

## 2017-06-22 PROCEDURE — 76818 FETAL BIOPHYS PROFILE W/NST: CPT | Performed by: OBSTETRICS & GYNECOLOGY

## 2017-06-22 PROCEDURE — 84156 ASSAY OF PROTEIN URINE: CPT | Performed by: OBSTETRICS & GYNECOLOGY

## 2017-06-22 PROCEDURE — 85025 COMPLETE CBC W/AUTO DIFF WBC: CPT | Performed by: OBSTETRICS & GYNECOLOGY

## 2017-06-22 PROCEDURE — 80053 COMPREHEN METABOLIC PANEL: CPT | Performed by: OBSTETRICS & GYNECOLOGY

## 2017-06-22 PROCEDURE — 76820 UMBILICAL ARTERY ECHO: CPT | Performed by: OBSTETRICS & GYNECOLOGY

## 2017-06-22 RX ORDER — FOLIC ACID 1 MG/1
1 TABLET ORAL DAILY
Status: DISCONTINUED | OUTPATIENT
Start: 2017-06-22 | End: 2017-06-26 | Stop reason: HOSPADM

## 2017-06-22 RX ORDER — DIAPER,BRIEF,INFANT-TODD,DISP
EACH MISCELLANEOUS 4 TIMES DAILY PRN
Status: DISCONTINUED | OUTPATIENT
Start: 2017-06-22 | End: 2017-06-26 | Stop reason: HOSPADM

## 2017-06-22 RX ORDER — FERROUS SULFATE 325(65) MG
325 TABLET ORAL DAILY
Status: DISCONTINUED | OUTPATIENT
Start: 2017-06-22 | End: 2017-06-22

## 2017-06-22 RX ADMIN — LABETALOL HCL 200 MG: 200 TABLET, FILM COATED ORAL at 09:41

## 2017-06-22 RX ADMIN — FOLIC ACID 1 MG: 1 TABLET ORAL at 14:34

## 2017-06-22 RX ADMIN — DOCUSATE SODIUM 100 MG: 100 CAPSULE, LIQUID FILLED ORAL at 21:33

## 2017-06-22 RX ADMIN — HEPARIN SODIUM 5000 UNITS: 5000 INJECTION, SOLUTION INTRAVENOUS; SUBCUTANEOUS at 21:33

## 2017-06-22 RX ADMIN — DOCUSATE SODIUM 100 MG: 100 CAPSULE, LIQUID FILLED ORAL at 09:41

## 2017-06-22 RX ADMIN — Medication 1 TABLET: at 11:31

## 2017-06-22 RX ADMIN — Medication 325 MG: at 11:32

## 2017-06-22 RX ADMIN — HEPARIN SODIUM 5000 UNITS: 5000 INJECTION, SOLUTION INTRAVENOUS; SUBCUTANEOUS at 08:55

## 2017-06-22 RX ADMIN — Medication 325 MG: at 16:20

## 2017-06-22 RX ADMIN — HYDROCORTISONE: 1 CREAM TOPICAL at 21:34

## 2017-06-22 RX ADMIN — LABETALOL HCL 200 MG: 200 TABLET, FILM COATED ORAL at 21:34

## 2017-06-23 ENCOUNTER — ANESTHESIA EVENT (INPATIENT)
Dept: LABOR AND DELIVERY | Facility: HOSPITAL | Age: 22
DRG: 540 | End: 2017-06-23
Payer: COMMERCIAL

## 2017-06-23 LAB
ALBUMIN SERPL BCP-MCNC: 1.8 G/DL (ref 3.5–5)
ALP SERPL-CCNC: 103 U/L (ref 46–116)
ALT SERPL W P-5'-P-CCNC: 8 U/L (ref 12–78)
ANION GAP SERPL CALCULATED.3IONS-SCNC: 9 MMOL/L (ref 4–13)
ANISOCYTOSIS BLD QL SMEAR: PRESENT
AST SERPL W P-5'-P-CCNC: 14 U/L (ref 5–45)
B2 GLYCOPROT1 IGA SER-ACNC: <9 GPI IGA UNITS (ref 0–25)
B2 GLYCOPROT1 IGG SER-ACNC: <9 GPI IGG UNITS (ref 0–20)
B2 GLYCOPROT1 IGM SER-ACNC: <9 GPI IGM UNITS (ref 0–32)
BASOPHILS # BLD MANUAL: 0 THOUSAND/UL (ref 0–0.1)
BASOPHILS NFR MAR MANUAL: 0 % (ref 0–1)
BILIRUB SERPL-MCNC: 0.2 MG/DL (ref 0.2–1)
BUN SERPL-MCNC: 11 MG/DL (ref 5–25)
BURR CELLS BLD QL SMEAR: PRESENT
CALCIUM SERPL-MCNC: 8 MG/DL (ref 8.3–10.1)
CHLORIDE SERPL-SCNC: 106 MMOL/L (ref 100–108)
CO2 SERPL-SCNC: 25 MMOL/L (ref 21–32)
CREAT SERPL-MCNC: 0.41 MG/DL (ref 0.6–1.3)
EOSINOPHIL # BLD MANUAL: 0 THOUSAND/UL (ref 0–0.4)
EOSINOPHIL NFR BLD MANUAL: 0 % (ref 0–6)
ERYTHROCYTE [DISTWIDTH] IN BLOOD BY AUTOMATED COUNT: 15.5 % (ref 11.6–15.1)
GFR SERPL CREATININE-BSD FRML MDRD: >60 ML/MIN/1.73SQ M
GLUCOSE SERPL-MCNC: 86 MG/DL (ref 65–140)
HCT VFR BLD AUTO: 30.1 % (ref 34.8–46.1)
HGB BLD-MCNC: 9.4 G/DL (ref 11.5–15.4)
LYMPHOCYTES # BLD AUTO: 1.25 THOUSAND/UL (ref 0.6–4.47)
LYMPHOCYTES # BLD AUTO: 10 % (ref 14–44)
MCH RBC QN AUTO: 24.6 PG (ref 26.8–34.3)
MCHC RBC AUTO-ENTMCNC: 31.2 G/DL (ref 31.4–37.4)
MCV RBC AUTO: 79 FL (ref 82–98)
METAMYELOCYTES NFR BLD MANUAL: 2 % (ref 0–1)
MICROCYTES BLD QL AUTO: PRESENT
MONOCYTES # BLD AUTO: 0.5 THOUSAND/UL (ref 0–1.22)
MONOCYTES NFR BLD: 4 % (ref 4–12)
MYELOCYTES NFR BLD MANUAL: 4 % (ref 0–1)
NEUTROPHILS # BLD MANUAL: 10.02 THOUSAND/UL (ref 1.85–7.62)
NEUTS BAND NFR BLD MANUAL: 1 % (ref 0–8)
NEUTS SEG NFR BLD AUTO: 79 % (ref 43–75)
NRBC BLD AUTO-RTO: 0 /100 WBCS
PLATELET # BLD AUTO: 347 THOUSANDS/UL (ref 149–390)
PLATELET BLD QL SMEAR: ADEQUATE
PMV BLD AUTO: 9.7 FL (ref 8.9–12.7)
POIKILOCYTOSIS BLD QL SMEAR: PRESENT
POLYCHROMASIA BLD QL SMEAR: PRESENT
POTASSIUM SERPL-SCNC: 4.2 MMOL/L (ref 3.5–5.3)
PROT S ACT/NOR PPP: 35 % (ref 57–157)
PROT S ACT/NOR PPP: 64 % (ref 63–140)
PROT S PPP-ACNC: 59 % (ref 60–150)
PROT SERPL-MCNC: 5.4 G/DL (ref 6.4–8.2)
RBC # BLD AUTO: 3.82 MILLION/UL (ref 3.81–5.12)
RBC MORPH BLD: PRESENT
SODIUM SERPL-SCNC: 140 MMOL/L (ref 136–145)
WBC # BLD AUTO: 12.52 THOUSAND/UL (ref 4.31–10.16)

## 2017-06-23 PROCEDURE — 4A1HXCZ MONITORING OF PRODUCTS OF CONCEPTION, CARDIAC RATE, EXTERNAL APPROACH: ICD-10-PCS | Performed by: OBSTETRICS & GYNECOLOGY

## 2017-06-23 PROCEDURE — 86900 BLOOD TYPING SEROLOGIC ABO: CPT | Performed by: OBSTETRICS & GYNECOLOGY

## 2017-06-23 PROCEDURE — 82805 BLOOD GASES W/O2 SATURATION: CPT | Performed by: OBSTETRICS & GYNECOLOGY

## 2017-06-23 PROCEDURE — 86901 BLOOD TYPING SEROLOGIC RH(D): CPT | Performed by: OBSTETRICS & GYNECOLOGY

## 2017-06-23 PROCEDURE — 86850 RBC ANTIBODY SCREEN: CPT | Performed by: OBSTETRICS & GYNECOLOGY

## 2017-06-23 PROCEDURE — 80053 COMPREHEN METABOLIC PANEL: CPT | Performed by: OBSTETRICS & GYNECOLOGY

## 2017-06-23 PROCEDURE — 87086 URINE CULTURE/COLONY COUNT: CPT | Performed by: OBSTETRICS & GYNECOLOGY

## 2017-06-23 PROCEDURE — 85007 BL SMEAR W/DIFF WBC COUNT: CPT | Performed by: OBSTETRICS & GYNECOLOGY

## 2017-06-23 PROCEDURE — 85027 COMPLETE CBC AUTOMATED: CPT | Performed by: OBSTETRICS & GYNECOLOGY

## 2017-06-23 PROCEDURE — 88307 TISSUE EXAM BY PATHOLOGIST: CPT | Performed by: OBSTETRICS & GYNECOLOGY

## 2017-06-23 RX ORDER — CLINDAMYCIN PHOSPHATE 900 MG/50ML
900 INJECTION INTRAVENOUS ONCE
Status: DISCONTINUED | OUTPATIENT
Start: 2017-06-23 | End: 2017-06-24

## 2017-06-23 RX ORDER — DEXTROSE, SODIUM CHLORIDE, SODIUM LACTATE, POTASSIUM CHLORIDE, AND CALCIUM CHLORIDE 5; .6; .31; .03; .02 G/100ML; G/100ML; G/100ML; G/100ML; G/100ML
125 INJECTION, SOLUTION INTRAVENOUS CONTINUOUS
Status: DISCONTINUED | OUTPATIENT
Start: 2017-06-23 | End: 2017-06-24

## 2017-06-23 RX ADMIN — HEPARIN SODIUM 5000 UNITS: 5000 INJECTION, SOLUTION INTRAVENOUS; SUBCUTANEOUS at 09:38

## 2017-06-23 RX ADMIN — SUCCINYLCHOLINE CHLORIDE 200 MG: 20 INJECTION, SOLUTION INTRAMUSCULAR; INTRAVENOUS at 23:58

## 2017-06-23 RX ADMIN — LABETALOL HCL 200 MG: 200 TABLET, FILM COATED ORAL at 09:38

## 2017-06-23 RX ADMIN — DOCUSATE SODIUM 100 MG: 100 CAPSULE, LIQUID FILLED ORAL at 17:57

## 2017-06-23 RX ADMIN — LABETALOL HCL 200 MG: 200 TABLET, FILM COATED ORAL at 20:43

## 2017-06-23 RX ADMIN — PROPOFOL 200 MG: 10 INJECTION, EMULSION INTRAVENOUS at 23:58

## 2017-06-23 RX ADMIN — DOCUSATE SODIUM 100 MG: 100 CAPSULE, LIQUID FILLED ORAL at 07:58

## 2017-06-23 RX ADMIN — Medication 1 TABLET: at 09:38

## 2017-06-23 RX ADMIN — FOLIC ACID 1 MG: 1 TABLET ORAL at 09:38

## 2017-06-23 RX ADMIN — LIDOCAINE HYDROCHLORIDE 100 MG: 20 INJECTION, SOLUTION INTRAVENOUS at 23:58

## 2017-06-23 RX ADMIN — HEPARIN SODIUM 5000 UNITS: 5000 INJECTION, SOLUTION INTRAVENOUS; SUBCUTANEOUS at 20:42

## 2017-06-23 RX ADMIN — Medication 325 MG: at 17:57

## 2017-06-23 RX ADMIN — SODIUM CHLORIDE, SODIUM LACTATE, POTASSIUM CHLORIDE, AND CALCIUM CHLORIDE: .6; .31; .03; .02 INJECTION, SOLUTION INTRAVENOUS at 23:50

## 2017-06-23 RX ADMIN — Medication 325 MG: at 07:58

## 2017-06-24 PROBLEM — O11.9 CHRONIC HYPERTENSION WITH SUPERIMPOSED PREECLAMPSIA: Status: ACTIVE | Noted: 2017-06-24

## 2017-06-24 PROBLEM — Z98.891 STATUS POST REPEAT LOW TRANSVERSE CESAREAN SECTION: Status: ACTIVE | Noted: 2017-06-24

## 2017-06-24 PROBLEM — Z3A.29 29 WEEKS GESTATION OF PREGNANCY: Status: RESOLVED | Noted: 2017-06-20 | Resolved: 2017-06-24

## 2017-06-24 PROBLEM — E66.9 ADIPOSITY: Status: ACTIVE | Noted: 2017-06-24

## 2017-06-24 PROBLEM — M62.81 RIGHT-SIDED MUSCLE WEAKNESS: Status: ACTIVE | Noted: 2017-06-24

## 2017-06-24 PROBLEM — O36.5990 FETAL GROWTH RETARDATION, ANTEPARTUM: Status: ACTIVE | Noted: 2017-06-24

## 2017-06-24 PROBLEM — O09.30 LATE PRENATAL CARE AFFECTING PREGNANCY: Status: ACTIVE | Noted: 2017-06-24

## 2017-06-24 PROBLEM — Z86.718 HISTORY OF THROMBOEMBOLISM OF VEIN: Status: ACTIVE | Noted: 2017-06-24

## 2017-06-24 PROBLEM — F31.9 BIPOLAR AFFECTIVE DISORDER (HCC): Status: ACTIVE | Noted: 2017-06-24

## 2017-06-24 PROBLEM — I63.9 CEREBRAL INFARCTION (HCC): Status: ACTIVE | Noted: 2017-06-24

## 2017-06-24 PROBLEM — Z3A.30 30 WEEKS GESTATION OF PREGNANCY: Status: ACTIVE | Noted: 2017-06-24

## 2017-06-24 PROBLEM — F41.9 ANXIETY: Status: ACTIVE | Noted: 2017-06-24

## 2017-06-24 LAB
ABO GROUP BLD: NORMAL
ALBUMIN SERPL BCP-MCNC: 1.6 G/DL (ref 3.5–5)
ALP SERPL-CCNC: 98 U/L (ref 46–116)
ALT SERPL W P-5'-P-CCNC: 9 U/L (ref 12–78)
ANION GAP SERPL CALCULATED.3IONS-SCNC: 9 MMOL/L (ref 4–13)
APTT SCREEN TO CONFIRM RATIO: 1.09 RATIO (ref 0–1.4)
AST SERPL W P-5'-P-CCNC: 20 U/L (ref 5–45)
BACTERIA UR CULT: NORMAL
BASE EXCESS BLDCOA CALC-SCNC: 0.8 MMOL/L (ref 3–11)
BASE EXCESS BLDCOV CALC-SCNC: -15.6 MMOL/L (ref 1–9)
BASE EXCESS BLDCOV CALC-SCNC: 0.5 MMOL/L (ref 1–9)
BASOPHILS # BLD AUTO: 0.02 THOUSANDS/ΜL (ref 0–0.1)
BASOPHILS NFR BLD AUTO: 0 % (ref 0–1)
BILIRUB SERPL-MCNC: 0.21 MG/DL (ref 0.2–1)
BLD GP AB SCN SERPL QL: NEGATIVE
BUN SERPL-MCNC: 9 MG/DL (ref 5–25)
CALCIUM SERPL-MCNC: 8.2 MG/DL (ref 8.3–10.1)
CHLORIDE SERPL-SCNC: 105 MMOL/L (ref 100–108)
CO2 SERPL-SCNC: 25 MMOL/L (ref 21–32)
CONFIRM APTT/NORMAL: 37 SEC (ref 0–55)
CREAT SERPL-MCNC: 0.38 MG/DL (ref 0.6–1.3)
EOSINOPHIL # BLD AUTO: 0.02 THOUSAND/ΜL (ref 0–0.61)
EOSINOPHIL NFR BLD AUTO: 0 % (ref 0–6)
ERYTHROCYTE [DISTWIDTH] IN BLOOD BY AUTOMATED COUNT: 15.8 % (ref 11.6–15.1)
GFR SERPL CREATININE-BSD FRML MDRD: >60 ML/MIN/1.73SQ M
GLUCOSE SERPL-MCNC: 66 MG/DL (ref 65–140)
HBV SURFACE AG SER QL: NORMAL
HCO3 BLDCOA-SCNC: 26.9 MMOL/L (ref 17.3–27.3)
HCO3 BLDCOV-SCNC: 17.6 MMOL/L (ref 12.2–28.6)
HCO3 BLDCOV-SCNC: 26.6 MMOL/L (ref 12.2–28.6)
HCT VFR BLD AUTO: 31.1 % (ref 34.8–46.1)
HGB BLD-MCNC: 9.9 G/DL (ref 11.5–15.4)
LA PPP-IMP: NORMAL
LYMPHOCYTES # BLD AUTO: 2.62 THOUSANDS/ΜL (ref 0.6–4.47)
LYMPHOCYTES NFR BLD AUTO: 15 % (ref 14–44)
MCH RBC QN AUTO: 25.4 PG (ref 26.8–34.3)
MCHC RBC AUTO-ENTMCNC: 31.8 G/DL (ref 31.4–37.4)
MCV RBC AUTO: 80 FL (ref 82–98)
MONOCYTES # BLD AUTO: 1.31 THOUSAND/ΜL (ref 0.17–1.22)
MONOCYTES NFR BLD AUTO: 7 % (ref 4–12)
NEUTROPHILS # BLD AUTO: 13.75 THOUSANDS/ΜL (ref 1.85–7.62)
NEUTS SEG NFR BLD AUTO: 78 % (ref 43–75)
NRBC BLD AUTO-RTO: 0 /100 WBCS
O2 CT VFR BLDCOA CALC: 12 ML/DL
OXYHGB MFR BLDCOA: 74 %
OXYHGB MFR BLDCOV: 30.1 %
OXYHGB MFR BLDCOV: 87.9 %
PCO2 BLDCOA: 49.7 MM[HG] (ref 30–60)
PCO2 BLDCOV: 49 MM HG (ref 27–43)
PCO2 BLDCOV: 77.2 MM HG (ref 27–43)
PH BLDCOA: 7.35 [PH] (ref 7.23–7.43)
PH BLDCOV: 6.98 [PH] (ref 7.19–7.49)
PH BLDCOV: 7.35 [PH] (ref 7.19–7.49)
PLATELET # BLD AUTO: 254 THOUSANDS/UL (ref 149–390)
PMV BLD AUTO: 9.8 FL (ref 8.9–12.7)
PO2 BLDCOA: 31.6 MM HG (ref 5–25)
PO2 BLDCOV: 19.6 MM HG (ref 15–45)
PO2 BLDCOV: 46.1 MM HG (ref 15–45)
POTASSIUM SERPL-SCNC: 4.4 MMOL/L (ref 3.5–5.3)
PROT SERPL-MCNC: 4.9 G/DL (ref 6.4–8.2)
RBC # BLD AUTO: 3.9 MILLION/UL (ref 3.81–5.12)
RH BLD: POSITIVE
SAO2 % BLDCOV: 14.3 ML/DL
SAO2 % BLDCOV: 6.8 ML/DL
SCREEN APTT: 27.7 SEC (ref 0–43.6)
SCREEN DRVVT: 35.1 SEC (ref 0–47)
SODIUM SERPL-SCNC: 139 MMOL/L (ref 136–145)
SPECIMEN EXPIRATION DATE: NORMAL
THROMBIN TIME: 16.4 SEC (ref 0–20.9)
WBC # BLD AUTO: 17.95 THOUSAND/UL (ref 4.31–10.16)

## 2017-06-24 PROCEDURE — 85025 COMPLETE CBC W/AUTO DIFF WBC: CPT | Performed by: OBSTETRICS & GYNECOLOGY

## 2017-06-24 PROCEDURE — 87340 HEPATITIS B SURFACE AG IA: CPT | Performed by: OBSTETRICS & GYNECOLOGY

## 2017-06-24 PROCEDURE — 94760 N-INVAS EAR/PLS OXIMETRY 1: CPT

## 2017-06-24 PROCEDURE — 80053 COMPREHEN METABOLIC PANEL: CPT | Performed by: OBSTETRICS & GYNECOLOGY

## 2017-06-24 RX ORDER — LABETALOL HYDROCHLORIDE 5 MG/ML
INJECTION, SOLUTION INTRAVENOUS
Status: COMPLETED
Start: 2017-06-24 | End: 2017-06-24

## 2017-06-24 RX ORDER — DIAPER,BRIEF,INFANT-TODD,DISP
1 EACH MISCELLANEOUS DAILY PRN
Status: DISCONTINUED | OUTPATIENT
Start: 2017-06-24 | End: 2017-06-26 | Stop reason: HOSPADM

## 2017-06-24 RX ORDER — IBUPROFEN 600 MG/1
600 TABLET ORAL EVERY 6 HOURS PRN
Status: DISCONTINUED | OUTPATIENT
Start: 2017-06-24 | End: 2017-06-26 | Stop reason: HOSPADM

## 2017-06-24 RX ORDER — OXYCODONE HYDROCHLORIDE AND ACETAMINOPHEN 5; 325 MG/1; MG/1
1 TABLET ORAL EVERY 4 HOURS PRN
Status: DISCONTINUED | OUTPATIENT
Start: 2017-06-24 | End: 2017-06-26 | Stop reason: HOSPADM

## 2017-06-24 RX ORDER — METOCLOPRAMIDE HYDROCHLORIDE 5 MG/ML
10 INJECTION INTRAMUSCULAR; INTRAVENOUS ONCE AS NEEDED
Status: DISCONTINUED | OUTPATIENT
Start: 2017-06-24 | End: 2017-06-24

## 2017-06-24 RX ORDER — LABETALOL HYDROCHLORIDE 5 MG/ML
40 INJECTION, SOLUTION INTRAVENOUS ONCE
Status: COMPLETED | OUTPATIENT
Start: 2017-06-24 | End: 2017-06-24

## 2017-06-24 RX ORDER — SUCCINYLCHOLINE CHLORIDE 20 MG/ML
INJECTION INTRAMUSCULAR; INTRAVENOUS AS NEEDED
Status: DISCONTINUED | OUTPATIENT
Start: 2017-06-23 | End: 2017-06-24 | Stop reason: SURG

## 2017-06-24 RX ORDER — OXYTOCIN 10 [USP'U]/ML
INJECTION, SOLUTION INTRAMUSCULAR; INTRAVENOUS AS NEEDED
Status: DISCONTINUED | OUTPATIENT
Start: 2017-06-24 | End: 2017-06-24 | Stop reason: SURG

## 2017-06-24 RX ORDER — LABETALOL 200 MG/1
200 TABLET, FILM COATED ORAL EVERY 12 HOURS SCHEDULED
Status: DISCONTINUED | OUTPATIENT
Start: 2017-06-24 | End: 2017-06-26 | Stop reason: HOSPADM

## 2017-06-24 RX ORDER — LABETALOL HYDROCHLORIDE 5 MG/ML
20 INJECTION, SOLUTION INTRAVENOUS ONCE
Status: COMPLETED | OUTPATIENT
Start: 2017-06-24 | End: 2017-06-24

## 2017-06-24 RX ORDER — FENTANYL CITRATE/PF 50 MCG/ML
25 SYRINGE (ML) INJECTION
Status: DISCONTINUED | OUTPATIENT
Start: 2017-06-24 | End: 2017-06-24

## 2017-06-24 RX ORDER — GENTAMICIN SULFATE 40 MG/ML
INJECTION, SOLUTION INTRAMUSCULAR; INTRAVENOUS AS NEEDED
Status: DISCONTINUED | OUTPATIENT
Start: 2017-06-24 | End: 2017-06-24 | Stop reason: SURG

## 2017-06-24 RX ORDER — SIMETHICONE 80 MG
80 TABLET,CHEWABLE ORAL 4 TIMES DAILY PRN
Status: DISCONTINUED | OUTPATIENT
Start: 2017-06-24 | End: 2017-06-26 | Stop reason: HOSPADM

## 2017-06-24 RX ORDER — SODIUM CHLORIDE, SODIUM LACTATE, POTASSIUM CHLORIDE, CALCIUM CHLORIDE 600; 310; 30; 20 MG/100ML; MG/100ML; MG/100ML; MG/100ML
25 INJECTION, SOLUTION INTRAVENOUS CONTINUOUS
Status: DISCONTINUED | OUTPATIENT
Start: 2017-06-24 | End: 2017-06-25 | Stop reason: ALTCHOICE

## 2017-06-24 RX ORDER — SODIUM CHLORIDE, SODIUM LACTATE, POTASSIUM CHLORIDE, CALCIUM CHLORIDE 600; 310; 30; 20 MG/100ML; MG/100ML; MG/100ML; MG/100ML
125 INJECTION, SOLUTION INTRAVENOUS CONTINUOUS
Status: DISCONTINUED | OUTPATIENT
Start: 2017-06-24 | End: 2017-06-25 | Stop reason: ALTCHOICE

## 2017-06-24 RX ORDER — MIDAZOLAM HYDROCHLORIDE 1 MG/ML
INJECTION INTRAMUSCULAR; INTRAVENOUS AS NEEDED
Status: DISCONTINUED | OUTPATIENT
Start: 2017-06-24 | End: 2017-06-24 | Stop reason: SURG

## 2017-06-24 RX ORDER — ONDANSETRON 2 MG/ML
INJECTION INTRAMUSCULAR; INTRAVENOUS AS NEEDED
Status: DISCONTINUED | OUTPATIENT
Start: 2017-06-24 | End: 2017-06-24 | Stop reason: SURG

## 2017-06-24 RX ORDER — DOCUSATE SODIUM 100 MG/1
100 CAPSULE, LIQUID FILLED ORAL 2 TIMES DAILY PRN
Status: DISCONTINUED | OUTPATIENT
Start: 2017-06-24 | End: 2017-06-26 | Stop reason: HOSPADM

## 2017-06-24 RX ORDER — PROPOFOL 10 MG/ML
INJECTION, EMULSION INTRAVENOUS AS NEEDED
Status: DISCONTINUED | OUTPATIENT
Start: 2017-06-23 | End: 2017-06-24 | Stop reason: SURG

## 2017-06-24 RX ORDER — ONDANSETRON 2 MG/ML
4 INJECTION INTRAMUSCULAR; INTRAVENOUS ONCE AS NEEDED
Status: COMPLETED | OUTPATIENT
Start: 2017-06-24 | End: 2017-06-24

## 2017-06-24 RX ORDER — DIPHENHYDRAMINE HYDROCHLORIDE 50 MG/ML
25 INJECTION INTRAMUSCULAR; INTRAVENOUS EVERY 6 HOURS PRN
Status: DISCONTINUED | OUTPATIENT
Start: 2017-06-24 | End: 2017-06-26 | Stop reason: HOSPADM

## 2017-06-24 RX ORDER — ALBUTEROL SULFATE 90 UG/1
AEROSOL, METERED RESPIRATORY (INHALATION) AS NEEDED
Status: DISCONTINUED | OUTPATIENT
Start: 2017-06-24 | End: 2017-06-24 | Stop reason: SURG

## 2017-06-24 RX ORDER — MAGNESIUM SULFATE IN WATER 40 MG/ML
6 INJECTION, SOLUTION INTRAVENOUS ONCE
Status: COMPLETED | OUTPATIENT
Start: 2017-06-24 | End: 2017-06-24

## 2017-06-24 RX ORDER — OXYCODONE HYDROCHLORIDE AND ACETAMINOPHEN 5; 325 MG/1; MG/1
2 TABLET ORAL EVERY 4 HOURS PRN
Status: DISCONTINUED | OUTPATIENT
Start: 2017-06-24 | End: 2017-06-26 | Stop reason: HOSPADM

## 2017-06-24 RX ORDER — ONDANSETRON 2 MG/ML
4 INJECTION INTRAMUSCULAR; INTRAVENOUS EVERY 8 HOURS PRN
Status: DISCONTINUED | OUTPATIENT
Start: 2017-06-24 | End: 2017-06-26 | Stop reason: HOSPADM

## 2017-06-24 RX ORDER — FENTANYL CITRATE 50 UG/ML
INJECTION, SOLUTION INTRAMUSCULAR; INTRAVENOUS AS NEEDED
Status: DISCONTINUED | OUTPATIENT
Start: 2017-06-24 | End: 2017-06-24 | Stop reason: SURG

## 2017-06-24 RX ORDER — CLINDAMYCIN PHOSPHATE 150 MG/ML
INJECTION, SOLUTION INTRAVENOUS AS NEEDED
Status: DISCONTINUED | OUTPATIENT
Start: 2017-06-24 | End: 2017-06-24 | Stop reason: SURG

## 2017-06-24 RX ORDER — SODIUM CHLORIDE, SODIUM LACTATE, POTASSIUM CHLORIDE, CALCIUM CHLORIDE 600; 310; 30; 20 MG/100ML; MG/100ML; MG/100ML; MG/100ML
INJECTION, SOLUTION INTRAVENOUS CONTINUOUS PRN
Status: DISCONTINUED | OUTPATIENT
Start: 2017-06-23 | End: 2017-06-24 | Stop reason: SURG

## 2017-06-24 RX ORDER — LABETALOL HYDROCHLORIDE 5 MG/ML
80 INJECTION, SOLUTION INTRAVENOUS ONCE
Status: COMPLETED | OUTPATIENT
Start: 2017-06-24 | End: 2017-06-24

## 2017-06-24 RX ADMIN — DOCUSATE SODIUM 100 MG: 100 CAPSULE, LIQUID FILLED ORAL at 09:17

## 2017-06-24 RX ADMIN — OXYTOCIN 20 UNITS: 10 INJECTION, SOLUTION INTRAMUSCULAR; INTRAVENOUS at 00:03

## 2017-06-24 RX ADMIN — GENTAMICIN SULFATE 80 MG: 40 INJECTION, SOLUTION INTRAMUSCULAR; INTRAVENOUS at 00:10

## 2017-06-24 RX ADMIN — LABETALOL HCL 200 MG: 200 TABLET, FILM COATED ORAL at 21:32

## 2017-06-24 RX ADMIN — ALBUTEROL SULFATE 4 PUFF: 90 AEROSOL, METERED RESPIRATORY (INHALATION) at 00:15

## 2017-06-24 RX ADMIN — LABETALOL HYDROCHLORIDE 20 MG: 5 INJECTION, SOLUTION INTRAVENOUS at 01:18

## 2017-06-24 RX ADMIN — Medication 1 TABLET: at 09:23

## 2017-06-24 RX ADMIN — ONDANSETRON 4 MG: 2 INJECTION INTRAMUSCULAR; INTRAVENOUS at 00:24

## 2017-06-24 RX ADMIN — FENTANYL CITRATE 50 MCG: 50 INJECTION, SOLUTION INTRAMUSCULAR; INTRAVENOUS at 00:07

## 2017-06-24 RX ADMIN — Medication 6 G: at 01:34

## 2017-06-24 RX ADMIN — FENTANYL CITRATE 25 MCG: 50 INJECTION INTRAMUSCULAR; INTRAVENOUS at 01:15

## 2017-06-24 RX ADMIN — FENTANYL CITRATE 25 MCG: 50 INJECTION INTRAMUSCULAR; INTRAVENOUS at 01:31

## 2017-06-24 RX ADMIN — LABETALOL HYDROCHLORIDE 40 MG: 5 INJECTION, SOLUTION INTRAVENOUS at 01:50

## 2017-06-24 RX ADMIN — CLINDAMYCIN PHOSPHATE 900 MG: 150 INJECTION, SOLUTION INTRAMUSCULAR; INTRAVENOUS at 00:00

## 2017-06-24 RX ADMIN — FOLIC ACID 1 MG: 1 TABLET ORAL at 09:24

## 2017-06-24 RX ADMIN — MAGNESIUM SULFATE HEPTAHYDRATE 2 G/HR: 500 INJECTION, SOLUTION INTRAMUSCULAR; INTRAVENOUS at 03:12

## 2017-06-24 RX ADMIN — ONDANSETRON 4 MG: 2 INJECTION INTRAMUSCULAR; INTRAVENOUS at 01:15

## 2017-06-24 RX ADMIN — Medication 325 MG: at 09:17

## 2017-06-24 RX ADMIN — HYDROMORPHONE HYDROCHLORIDE 0.5 MG: 1 INJECTION, SOLUTION INTRAMUSCULAR; INTRAVENOUS; SUBCUTANEOUS at 02:55

## 2017-06-24 RX ADMIN — HYDROMORPHONE HYDROCHLORIDE 0.5 MG: 1 INJECTION, SOLUTION INTRAMUSCULAR; INTRAVENOUS; SUBCUTANEOUS at 02:14

## 2017-06-24 RX ADMIN — SODIUM CHLORIDE, SODIUM LACTATE, POTASSIUM CHLORIDE, AND CALCIUM CHLORIDE: .6; .31; .03; .02 INJECTION, SOLUTION INTRAVENOUS at 00:47

## 2017-06-24 RX ADMIN — FENTANYL CITRATE 50 MCG: 50 INJECTION, SOLUTION INTRAMUSCULAR; INTRAVENOUS at 00:20

## 2017-06-24 RX ADMIN — FENTANYL CITRATE 50 MCG: 50 INJECTION, SOLUTION INTRAMUSCULAR; INTRAVENOUS at 00:11

## 2017-06-24 RX ADMIN — LABETALOL HYDROCHLORIDE 80 MG: 5 INJECTION, SOLUTION INTRAVENOUS at 03:04

## 2017-06-24 RX ADMIN — MIDAZOLAM HYDROCHLORIDE 2 MG: 1 INJECTION, SOLUTION INTRAMUSCULAR; INTRAVENOUS at 00:06

## 2017-06-24 RX ADMIN — HEPARIN SODIUM 5000 UNITS: 5000 INJECTION, SOLUTION INTRAVENOUS; SUBCUTANEOUS at 09:16

## 2017-06-24 RX ADMIN — OXYCODONE HYDROCHLORIDE AND ACETAMINOPHEN 2 TABLET: 5; 325 TABLET ORAL at 21:32

## 2017-06-24 RX ADMIN — SODIUM CHLORIDE, SODIUM LACTATE, POTASSIUM CHLORIDE, AND CALCIUM CHLORIDE 25 ML/HR: .6; .31; .03; .02 INJECTION, SOLUTION INTRAVENOUS at 01:34

## 2017-06-24 RX ADMIN — HEPARIN SODIUM 5000 UNITS: 5000 INJECTION, SOLUTION INTRAVENOUS; SUBCUTANEOUS at 21:31

## 2017-06-24 RX ADMIN — DOCUSATE SODIUM 100 MG: 100 CAPSULE, LIQUID FILLED ORAL at 16:43

## 2017-06-24 RX ADMIN — Medication: at 03:25

## 2017-06-24 RX ADMIN — LABETALOL HCL 200 MG: 200 TABLET, FILM COATED ORAL at 09:41

## 2017-06-24 RX ADMIN — Medication 325 MG: at 16:43

## 2017-06-24 RX ADMIN — FENTANYL CITRATE 50 MCG: 50 INJECTION, SOLUTION INTRAMUSCULAR; INTRAVENOUS at 00:27

## 2017-06-25 LAB
ALBUMIN SERPL BCP-MCNC: 1.8 G/DL (ref 3.5–5)
ALP SERPL-CCNC: 122 U/L (ref 46–116)
ALT SERPL W P-5'-P-CCNC: 13 U/L (ref 12–78)
ANION GAP SERPL CALCULATED.3IONS-SCNC: 9 MMOL/L (ref 4–13)
AST SERPL W P-5'-P-CCNC: 21 U/L (ref 5–45)
BILIRUB SERPL-MCNC: 0.33 MG/DL (ref 0.2–1)
BUN SERPL-MCNC: 9 MG/DL (ref 5–25)
CALCIUM SERPL-MCNC: 8.1 MG/DL (ref 8.3–10.1)
CHLORIDE SERPL-SCNC: 100 MMOL/L (ref 100–108)
CO2 SERPL-SCNC: 29 MMOL/L (ref 21–32)
CREAT SERPL-MCNC: 0.45 MG/DL (ref 0.6–1.3)
GFR SERPL CREATININE-BSD FRML MDRD: >60 ML/MIN/1.73SQ M
GLUCOSE SERPL-MCNC: 75 MG/DL (ref 65–140)
POTASSIUM SERPL-SCNC: 4.2 MMOL/L (ref 3.5–5.3)
PROT SERPL-MCNC: 5.8 G/DL (ref 6.4–8.2)
SODIUM SERPL-SCNC: 138 MMOL/L (ref 136–145)

## 2017-06-25 PROCEDURE — 80053 COMPREHEN METABOLIC PANEL: CPT | Performed by: OBSTETRICS & GYNECOLOGY

## 2017-06-25 RX ADMIN — DOCUSATE SODIUM 100 MG: 100 CAPSULE, LIQUID FILLED ORAL at 09:30

## 2017-06-25 RX ADMIN — LABETALOL HCL 200 MG: 200 TABLET, FILM COATED ORAL at 21:50

## 2017-06-25 RX ADMIN — Medication 325 MG: at 09:30

## 2017-06-25 RX ADMIN — ENOXAPARIN SODIUM 40 MG: 40 INJECTION SUBCUTANEOUS at 21:51

## 2017-06-25 RX ADMIN — LABETALOL HCL 200 MG: 200 TABLET, FILM COATED ORAL at 09:32

## 2017-06-25 RX ADMIN — FOLIC ACID 1 MG: 1 TABLET ORAL at 09:30

## 2017-06-25 RX ADMIN — OXYCODONE HYDROCHLORIDE AND ACETAMINOPHEN 1 TABLET: 5; 325 TABLET ORAL at 11:24

## 2017-06-25 RX ADMIN — Medication 1 TABLET: at 09:50

## 2017-06-25 RX ADMIN — Medication 325 MG: at 17:25

## 2017-06-25 RX ADMIN — OXYCODONE HYDROCHLORIDE AND ACETAMINOPHEN 1 TABLET: 5; 325 TABLET ORAL at 17:25

## 2017-06-25 RX ADMIN — ENOXAPARIN SODIUM 40 MG: 40 INJECTION SUBCUTANEOUS at 09:30

## 2017-06-25 RX ADMIN — DOCUSATE SODIUM 100 MG: 100 CAPSULE, LIQUID FILLED ORAL at 17:25

## 2017-06-26 VITALS
TEMPERATURE: 97.7 F | BODY MASS INDEX: 53.43 KG/M2 | SYSTOLIC BLOOD PRESSURE: 148 MMHG | WEIGHT: 283 LBS | DIASTOLIC BLOOD PRESSURE: 80 MMHG | HEART RATE: 80 BPM | RESPIRATION RATE: 18 BRPM | HEIGHT: 61 IN | OXYGEN SATURATION: 95 %

## 2017-06-26 LAB
ALBUMIN SERPL BCP-MCNC: 1.9 G/DL (ref 3.5–5)
ALP SERPL-CCNC: 115 U/L (ref 46–116)
ALT SERPL W P-5'-P-CCNC: 14 U/L (ref 12–78)
ANION GAP SERPL CALCULATED.3IONS-SCNC: 6 MMOL/L (ref 4–13)
AST SERPL W P-5'-P-CCNC: 17 U/L (ref 5–45)
BASOPHILS # BLD AUTO: 0.03 THOUSANDS/ΜL (ref 0–0.1)
BASOPHILS NFR BLD AUTO: 0 % (ref 0–1)
BILIRUB SERPL-MCNC: 0.3 MG/DL (ref 0.2–1)
BUN SERPL-MCNC: 10 MG/DL (ref 5–25)
CALCIUM SERPL-MCNC: 8.6 MG/DL (ref 8.3–10.1)
CHLORIDE SERPL-SCNC: 102 MMOL/L (ref 100–108)
CO2 SERPL-SCNC: 31 MMOL/L (ref 21–32)
COMMENT: NORMAL
CREAT SERPL-MCNC: 0.38 MG/DL (ref 0.6–1.3)
EOSINOPHIL # BLD AUTO: 0.15 THOUSAND/ΜL (ref 0–0.61)
EOSINOPHIL NFR BLD AUTO: 1 % (ref 0–6)
ERYTHROCYTE [DISTWIDTH] IN BLOOD BY AUTOMATED COUNT: 15.8 % (ref 11.6–15.1)
F5 GENE MUT ANL BLD/T: NORMAL
GFR SERPL CREATININE-BSD FRML MDRD: >60 ML/MIN/1.73SQ M
GLUCOSE SERPL-MCNC: 73 MG/DL (ref 65–140)
HCT VFR BLD AUTO: 34.8 % (ref 34.8–46.1)
HGB BLD-MCNC: 10.8 G/DL (ref 11.5–15.4)
LYMPHOCYTES # BLD AUTO: 2.35 THOUSANDS/ΜL (ref 0.6–4.47)
LYMPHOCYTES NFR BLD AUTO: 19 % (ref 14–44)
MCH RBC QN AUTO: 25.1 PG (ref 26.8–34.3)
MCHC RBC AUTO-ENTMCNC: 31 G/DL (ref 31.4–37.4)
MCV RBC AUTO: 81 FL (ref 82–98)
MONOCYTES # BLD AUTO: 0.49 THOUSAND/ΜL (ref 0.17–1.22)
MONOCYTES NFR BLD AUTO: 4 % (ref 4–12)
NEUTROPHILS # BLD AUTO: 9.45 THOUSANDS/ΜL (ref 1.85–7.62)
NEUTS SEG NFR BLD AUTO: 76 % (ref 43–75)
NRBC BLD AUTO-RTO: 0 /100 WBCS
PLATELET # BLD AUTO: 262 THOUSANDS/UL (ref 149–390)
PMV BLD AUTO: 9.5 FL (ref 8.9–12.7)
POTASSIUM SERPL-SCNC: 4.2 MMOL/L (ref 3.5–5.3)
PROT SERPL-MCNC: 6 G/DL (ref 6.4–8.2)
RBC # BLD AUTO: 4.31 MILLION/UL (ref 3.81–5.12)
SODIUM SERPL-SCNC: 139 MMOL/L (ref 136–145)
WBC # BLD AUTO: 12.6 THOUSAND/UL (ref 4.31–10.16)

## 2017-06-26 PROCEDURE — 80053 COMPREHEN METABOLIC PANEL: CPT | Performed by: OBSTETRICS & GYNECOLOGY

## 2017-06-26 PROCEDURE — 85025 COMPLETE CBC W/AUTO DIFF WBC: CPT | Performed by: OBSTETRICS & GYNECOLOGY

## 2017-06-26 RX ORDER — DOCUSATE SODIUM 100 MG/1
100 CAPSULE, LIQUID FILLED ORAL 2 TIMES DAILY PRN
Qty: 10 CAPSULE | Refills: 0
Start: 2017-06-26 | End: 2018-06-20 | Stop reason: ALTCHOICE

## 2017-06-26 RX ORDER — NIFEDIPINE 30 MG/1
30 TABLET, EXTENDED RELEASE ORAL DAILY
Status: DISCONTINUED | OUTPATIENT
Start: 2017-06-26 | End: 2017-06-26 | Stop reason: HOSPADM

## 2017-06-26 RX ORDER — LABETALOL HYDROCHLORIDE 5 MG/ML
20 INJECTION, SOLUTION INTRAVENOUS ONCE
Status: COMPLETED | OUTPATIENT
Start: 2017-06-26 | End: 2017-06-26

## 2017-06-26 RX ORDER — LABETALOL 200 MG/1
200 TABLET, FILM COATED ORAL EVERY 12 HOURS SCHEDULED
Qty: 60 TABLET | Refills: 0
Start: 2017-06-26 | End: 2018-03-13 | Stop reason: ALTCHOICE

## 2017-06-26 RX ORDER — NIFEDIPINE 30 MG/1
30 TABLET, FILM COATED, EXTENDED RELEASE ORAL DAILY
Qty: 30 TABLET | Refills: 0
Start: 2017-06-26 | End: 2019-08-14 | Stop reason: ALTCHOICE

## 2017-06-26 RX ADMIN — Medication 325 MG: at 08:08

## 2017-06-26 RX ADMIN — LABETALOL HCL 200 MG: 200 TABLET, FILM COATED ORAL at 08:08

## 2017-06-26 RX ADMIN — FOLIC ACID 1 MG: 1 TABLET ORAL at 08:08

## 2017-06-26 RX ADMIN — DOCUSATE SODIUM 100 MG: 100 CAPSULE, LIQUID FILLED ORAL at 08:08

## 2017-06-26 RX ADMIN — LABETALOL HYDROCHLORIDE 20 MG: 5 INJECTION, SOLUTION INTRAVENOUS at 07:17

## 2017-06-26 RX ADMIN — Medication 1 TABLET: at 08:08

## 2017-06-26 RX ADMIN — NIFEDIPINE 30 MG: 30 TABLET, FILM COATED, EXTENDED RELEASE ORAL at 08:25

## 2017-06-26 RX ADMIN — ENOXAPARIN SODIUM 40 MG: 40 INJECTION SUBCUTANEOUS at 08:14

## 2017-06-28 ENCOUNTER — ALLSCRIPTS OFFICE VISIT (OUTPATIENT)
Dept: OTHER | Facility: OTHER | Age: 22
End: 2017-06-28

## 2017-06-29 LAB
F2 GENE MUT ANL BLD/T: NORMAL
Lab: NORMAL

## 2017-07-05 ENCOUNTER — ANESTHESIA (OUTPATIENT)
Dept: OTHER | Facility: OTHER | Age: 22
End: 2017-07-05

## 2017-08-01 ENCOUNTER — ALLSCRIPTS OFFICE VISIT (OUTPATIENT)
Dept: OTHER | Facility: OTHER | Age: 22
End: 2017-08-01

## 2017-08-01 PROCEDURE — G0145 SCR C/V CYTO,THINLAYER,RESCR: HCPCS | Performed by: OBSTETRICS & GYNECOLOGY

## 2017-08-04 ENCOUNTER — LAB REQUISITION (OUTPATIENT)
Dept: LAB | Facility: HOSPITAL | Age: 22
End: 2017-08-04
Payer: COMMERCIAL

## 2017-08-04 DIAGNOSIS — Z11.3 ENCOUNTER FOR SCREENING FOR INFECTIONS WITH PREDOMINANTLY SEXUAL MODE OF TRANSMISSION: ICD-10-CM

## 2017-08-04 DIAGNOSIS — Z01.419 ENCOUNTER FOR GYNECOLOGICAL EXAMINATION WITHOUT ABNORMAL FINDING: ICD-10-CM

## 2017-08-04 PROCEDURE — 87491 CHLMYD TRACH DNA AMP PROBE: CPT | Performed by: OBSTETRICS & GYNECOLOGY

## 2017-08-04 PROCEDURE — 87591 N.GONORRHOEAE DNA AMP PROB: CPT | Performed by: OBSTETRICS & GYNECOLOGY

## 2017-08-08 LAB
CHLAMYDIA DNA CVX QL NAA+PROBE: NORMAL
N GONORRHOEA DNA GENITAL QL NAA+PROBE: NORMAL

## 2017-08-10 LAB
LAB AP GYN PRIMARY INTERPRETATION: NORMAL
Lab: NORMAL

## 2017-12-13 ENCOUNTER — ALLSCRIPTS OFFICE VISIT (OUTPATIENT)
Dept: OTHER | Facility: OTHER | Age: 22
End: 2017-12-13

## 2017-12-14 NOTE — PROCEDURES
Assessment  1  Amenorrhea (626 0) (N91 2)    Plan  PMH: Chronic hypertension with superimposed preeclampsia    · Labetalol HCl - 200 MG Oral Tablet; TAKE 1 TABLET TWICE DAILY   Rx By: Vern Allen; Dispense: 90 Days ; #:180 Tablet; Refill: 1;PMH: Chronic hypertension with superimposed preeclampsia; MILDRED = N; Verified Transmission to [address]; Last Updated By: System, SureScripts; 12/13/2017 7:20:00 AM   · NIFEdipine ER Osmotic Release 30 MG Oral Tablet Extended Release 24 Hour(Procardia XL); TAKE 1 TABLET DAILY   Rx By: Vern Allen; Dispense: 30 Days ; #:30 Tablet Extended Release 24 Hour; Refill: 1;For: PMH: Chronic hypertension with superimposed preeclampsia; MILDRED = N; [facility]; Last Updated By: System, SureScripts; 12/13/2017 7:20:00 AM    Discussion/Summary  Discussion Summary:   LMP was 10/12/2017, corrected due date is 07/28/2018  Active Problems    1  Acute pain (338 19) (R52)   2  Bipolar disorder (296 80) (F31 9)   3  Contraceptive use education (V25 09) (Z30 09)   4  Encounter for postpartum visit (V24 2) (Z39 2)   5  Hemiparesis, right (342 90) (G81 91)   6  History of venous thromboembolism (V12 51) (Z86 718)   7  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,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,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)   8  Visit for routine gyn exam (V72 31) (Z01 419)    Allergies  1  Penicillins    2  Bee sting   3  No Known Environmental Allergies   4  Other    Results/Data  [id number] Transvaginal Ultrasound OB [facility]:  Procedure: [id number] pregnant uterus real time with image documentation, fetal and maternal evaluation, first trimester (<14 weeks 0 days), transvaginal approach: single or first gestation  -- The study was done today in the office  Indication: EDC gestational age 7 7/8 weeks  Exam indication: amenorrhea  Findings:  Number of gestational sacs and fetuses: one    Gestational sac/fetal measurements appropriate for gestation: CRL is 1 29 cm equals 7 4/7 weeks with due date of 07/28/2018  Survey of visible fetal and placental anatomic structure cardiac motion is present  Qualitative assessment of amniotic fluid volume/gestational sac shape: nl   Exam of maternal uterus and adnexa: nl   Impression: viable IUP        Future Appointments    Date/Time Provider Specialty Site   02/05/2018 01:15 PM Mariel Dillon MD Obstetrics/Gynecology [facility]       Signatures   Electronically signed by : DOUG Hill ; Dec 13 2017 11:39AM EST                       (Author)

## 2018-01-09 NOTE — MISCELLANEOUS
Message   Recorded as Task   Date: 05/23/2017 08:44 AM, Created By: Jhonnie Najjar   Task Name: Call Back   Assigned To: Uri Torrez OB,Team   Regarding Patient: Spring Julian, Status: In Progress   Ivan Spikes - 23 May 2017 8:44 AM     TASK CREATED  Caller: Self; [phone] (Home)  pt needs a letter stating she cant take her antidepressants due to her pregnancy any quest call [phone] - 23 May 2017 9:13 AM     TASK IN PROGRESS   AmadouAmaury - 23 May 2017 9:15 AM     TASK EDITED  returned pts' p c - L/M for pt to return p c  prior to 4pm today  Active Problems    1  Anxiety (300 00) (F41 9)   2  Bipolar disorder (296 80) (F31 9)   3  Birth Control   4  Chronic hypertension in obstetric context, second trimester (642 03) (O10 912)   5  Dichorionic diamniotic twin pregnancy in first trimester (651 03,V91 03) (O30 041)   6  Encounter for supervision of other normal pregnancy in second trimester (V22 1)   (Z34 82)   7  Encounter to determine fetal viability of pregnancy, fetus 1 (V23 87) (O36 80X1)   8  Encounter to determine fetal viability of pregnancy, fetus 2 (V23 87) (O36 80X2)   9  Hemiparesis, right (342 90) (G81 91)   10  History of allergy (V15 09) (Z88 9)   11  History of venous thromboembolism (V12 51) (Z86 718)   12  Late prenatal care affecting pregnancy (V23 7) (O09 30)   13  Obesity, unspecified obesity severity, unspecified obesity type (278 00) (E66 9)   14  Scabies (133 0) (B86)   15  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 (838 20,866 13) (P91 0,I63 9)   16  Urinary tract infection (599 0) (N39 0)   17  Vaginal bleeding in pregnancy, first trimester (640 93) (O20 9)    Current Meds   1  Aspirin 81 MG Oral Tablet Chewable; CHEW AND SWALLOW 1 TABLET DAILY; Therapy: 01ONB2647 to (Evaluate:06Apr2017)  Requested for: 53YDS2153; Last   Rx:07Mar2017 Ordered   2  Aspirin Adult Low Strength 81 MG Oral Tablet Chewable; USE AS DIRECTED; Therapy: 96TOQ1145 to Recorded   3   Enoxaparin Sodium 40 MG/0 4ML Subcutaneous Solution; INJECT 40 MG Twice daily   subcutaneous administration; Therapy: 05MTM2306 to (Evaluate:05Jun2017)  Requested for: 92VUM1640; Last   Rx:07Mar2017 Ordered   4  EpiPen 2-[place] 0 3 MG/0 3ML JORDON; INJECT INTO THE UPPER OUTER THIGH AS   NEEDED FOR A SEVERE ALLERGIC REACTION; Therapy: 57Web0023 to (Last Rx:15Apr2014)  Requested for: 15Apr2014 Ordered   5  Ferrous Sulfate 325 (65 Fe) MG Oral Tablet; TAKE 1 TABLET DAILY WITH FOOD; Therapy: 08JML4343 to (Andrew Toussaint)  Requested for: 39NVX0989; Last   Rx:07Mar2017 Ordered   6  Folic Acid 1 MG Oral Tablet; Take 1 tablet daily; Therapy: 10YRP7830 to (Donal Chou)  Requested for: 89KXS1898; Last   DA:94DDG4542 Ordered   7  Prenatal Multivitamin + DHA 28-0 8 & 200 MG Oral Miscellaneous; TAKE DAILY AS   DIRECTED; Therapy: 10BCG6673 to (Tarun Sanders)  Requested for: 59AIN7193; Last   Rx:07Mar2017 Ordered   8  Prenatal Vitamins TABS; Therapy: (Recorded:69Mhx2515) to Recorded    Allergies    1  Penicillins    2  Bee sting   3  No Known Environmental Allergies   4   Other    Signatures   Electronically signed by : Adalberto Vann RN; May 23 2017  9:15AM EST                       (Author)

## 2018-01-09 NOTE — MISCELLANEOUS
Message   Recorded as Task   Date: 06/14/2017 01:02 PM, Created By: AMIRAH LOPEZ   Task Name: Care Coordination   Assigned To: THIERRY OB,Team   Regarding Patient: Richard Mccauley, Status: In Progress   Comment:    Opal Arenas - 14 Jun 2017 1:02 PM     TASK CREATED  Pt  got steroid shots yesterday and is not feeling well today  Her legs are giving out and she is not able to stand  Pt  is pregnant with twins and would like to know what she should do moving forward  Pls call pt  at [phone]  [facility] - 14 Jun 2017 1:41 PM     TASK IN PROGRESS   [facility] - 14 Jun 2017 1:50 PM     TASK REPLIED TO: Previously Assigned To [address] with pts sister-in law, as well as pt, and a male while they were in the car on the way to [facility]  It was difficult to determine who I was speaking with  States pt had a weak leg after her injections and "fell twice last night"  Someone told me that had already called [facility] and were told it may be nerve injury from the injection? Pt advised to come directly to apt and discuss with nurses upon arrive  Called [facility] to coordinate care  Will discuss the nature of pts "falls" at that time  [facility] - 14 Jun 2017 3:41 PM     TASK REPLIED TO: Previously Assigned To [address] with TOMMY Randolph at [facility] following pts apt  She was triaged and case discussed with Dr Dolores Yanes  Active Problems    1  Anxiety (300 00) (F41 9)   2  Bipolar disorder (296 80) (F31 9)   3  Birth Control   4  Chronic hypertension in obstetric context, second trimester (642 03) (O10 912)   5  Dichorionic diamniotic twin pregnancy in first trimester (651 03,V91 03) (O30 041)   6  Encounter for supervision of other normal pregnancy in second trimester (V22 1)   (Z34 82)   7  Encounter to determine fetal viability of pregnancy, fetus 1 (V23 87) (O36 80X1)   8  Encounter to determine fetal viability of pregnancy, fetus 2 (V23 87) (O36 80X2)   9  Hemiparesis, right (342 90) (G81 91)   10  History of allergy (V15 09) (Z88 9)   11  History of venous thromboembolism (V12 51) (Z86 718)   12  Late prenatal care affecting pregnancy (V23 7) (O09 30)   13  Obesity, unspecified obesity severity, unspecified obesity type (278 00) (E66 9)   14  Poor fetal growth affecting management of mother, second trimester, fetus 1 (656 53)    (A49 0666)   13  Scabies (133 0) (B86)   16  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 (335 91,317 93) (P91 0,I63 9)   17  Urinary tract infection (599 0) (N39 0)   18  Vaginal bleeding in pregnancy, first trimester (640 93) (O20 9)    Current Meds   1  Aspirin 81 MG Oral Tablet Chewable; CHEW AND SWALLOW 1 TABLET DAILY; Therapy: 95MJL9762 to (Evaluate:06Apr2017)  Requested for: 86JUT5238; Last   Rx:07Mar2017 Ordered   2  Aspirin Adult Low Strength 81 MG Oral Tablet Chewable; USE AS DIRECTED; Therapy: 39CWO1931 to Recorded   3  Betamethasone Sod Phos & Acet 6 (3-3) MG/ML Injection Suspension; 12mg im repeat in   24 hours; Therapy: 04BIO2323 to (Last Rx:13Jun2017) Ordered   4  Enoxaparin Sodium 40 MG/0 4ML Subcutaneous Solution; INJECT 40 MG Twice daily   subcutaneous administration; Therapy: 30FFL2079 to (Evaluate:05Jun2017)  Requested for: 90DYP1692; Last   Rx:07Mar2017 Ordered   5  EpiPen 2-[place]rent[place] 0 3 MG/0 3ML JORDON; INJECT INTO THE UPPER OUTER THIGH AS   NEEDED FOR A SEVERE ALLERGIC REACTION; Therapy: 15Apr2014 to (Last Rx:15Apr2014)  Requested for: 15Apr2014 Ordered   6  Ferrous Sulfate 325 (65 Fe) MG Oral Tablet; TAKE 1 TABLET DAILY WITH FOOD; Therapy: 41SYR4986 to (Meryle Silvan)  Requested for: 13RYB5058; Last   Rx:07Mar2017 Ordered   7  Folic Acid 1 MG Oral Tablet; Take 1 tablet daily; Therapy: 13DGH3676 to (Tyler Carter)  Requested for: 45UBE0805; Last   MB:10MJW1364 Ordered   8  Prenatal Multivitamin + DHA 28-0 8 & 200 MG Oral Miscellaneous; TAKE DAILY AS   DIRECTED; Therapy: 13ACG7809 to (Sonido Laughlin)  Requested for: 53UVP6829; Last   Rx:07Mar2017 Ordered   9   Prenatal Vitamins TABS; Therapy: (Recorded:02Ggx1122) to Recorded    Allergies    1  Penicillins    2  Bee sting   3  No Known Environmental Allergies   4   Other    Signatures   Electronically signed by : Madison Mclaughlin, ; Jun 14 2017  3:41PM EST                       (Author)

## 2018-01-10 NOTE — PROGRESS NOTES
2017         RE: Aliyah Ricci                                To: [facility]  MR#: [id number]                                   [address]   : [address]   ENC: [id number]                             [place], [address]   (Exam #: [id number])                           Fax: [phone]      The LMP of this 22year old,  G3, P1-1-0-2 patient was OCT 28 2016, her   working AIDAN is AUG 32 2017 and the current gestational age is 16 weeks 6   days by  [facility] [address]  A sonographic examination was performed on 2017 using real time equipment  The ultrasound examination was   performed using abdominal technique  The patient has a BMI of 43 6  Her   blood pressure today was 117/82  Earliest ultrasound found in her record: 2017  10w5d  17 AIDAN      Fetus A   Cardiac motion was observed at 152 bpm       Fetus B   Cardiac motion was observed at 161 bpm       INDICATIONS      multiple gestation - twins      Exam Types      Level I      RESULTS      Fetus # 1 of 2   Fetal growth appeared normal   Fetal position = Maternal Left   Placenta Location = Anterior   Chorionicity = Dichorionic, Diamniotic      Fetus # 2 of 2   Fetal growth appeared normal   Fetal position = Maternal Right   Placenta Location = Posterior   Chorionicity = Dichorionic, Diamniotic      MEASUREMENTS (* Included In Average GA)      FETUS A   CRL              6 4 cm        12 weeks 4 days*   Nuchal Trans    1 50 mm      Fetus A AVERAGE G  A  is 12 weeks 4 days +/- 7 days  FETUS B   CRL              6 3 cm        12 weeks 4 days*   Nuchal Trans    1 10 mm      Fetus B AVERAGE G  A  is 12 weeks 4 days +/- 7 days        AMNIOTIC FLUID      Fetus A   Amniotic Fluid: Normal         Fetus B   Amniotic Fluid: Normal      ANATOMY COMMENTS      FETUS A   Anatomic detail of fetus "A" is limited at this gestational age and by the   constraints related to maternal morbid obesity  The yolk sac was not   noted  The fetal cranium appeared normal in shape and the nuchal   translucency was normal in size at 1 1 mm  The nasal bone appears to be   present  The intracranial anatomy was unremarkable  Evaluation of the   spine  is suboptimal secondary to unfavorable fetal position  Anatomy of   the fetal thorax appeared within normal limits  The cardiac rhythm was   regular  Within the abdomen, stomach were visualized and the abdominal   wall appeared intact  A three vessel cord appears to be present  Active   movement of the fetal body & extremities was seen  There is no suspicion   of a subchorionic bleed  The placental cord insertion was normal    There   is no suspicion of a uterine myoma  Free fluid is not seen in the   posterior cul-de-sac  FETUS B   Anatomic detail of fetus "B" is limited at this gestational age  and by   the constraints related to maternal morbid obesity  The yolk sac was not   noted  The fetal cranium appeared normal in shape and the nuchal   translucency was normal in size at 1 1 mm  The nasal bone appears to be   present  The intracranial anatomy was unremarkable  Evaluation of the   spine is suboptimal secondary to unfavorable fetal position  Anatomy of   the fetal thorax appeared within normal limits  The cardiac rhythm was   regular  Within the abdomen, stomach  were visualized and the abdominal   wall appeared intact  Active movement of the fetal body & extremities   was seen  There is no suspicion of a subchorionic bleed  The placental   cord insertion was normal       ADNEXA      The left ovary appeared normal and measured 2 5 x 2 3 x 1 8 cm with a   volume of 5 4 cc  The right ovary was not visualized        IMPRESSION      Twin IUP (Fetus A)   12 weeks and 4 days by this ultrasound  (AIDAN=SEP 2 2017)   12 weeks and 6 days by [facility]o  (AIDAN=AUG 31 2017)   Fetal growth appeared normal   Regular fetal heart rate of 152 bpm   Anterior placenta   Fetal position = Maternal , Left   Dichorionic, diamniotic      Twin IUP (Fetus B)   12 weeks and 4 days by this ultrasound  (AIDNA=SEP 2 2017)   12 weeks and 6 days by 1st Tri Sono  (AIDAN=AUG 31 2017)   Fetal growth appeared normal   Regular fetal heart rate of 161 bpm   Posterior placenta   Fetal position = Maternal , Right   Dichorionic, diamniotic      GENERAL COMMENT      Separate placentas are present  A thick, multilayered dividing membrane is   present  This appears to be a dichorionic, diamniotic twin pregnancy  CONSULT COMMENT      Thank you very much for your kind referral of Attila Frey to the   [facility]  in [place] on 2017 for first trimester   ultrasound evaluation and MFM consult  Jeffery Yi is a 50-year-old  3   para 02 white female who is currently at 12-6/7 weeks gestation by an   estimated due date of 2017  She is referred for the indication   of a spontaneously conceived twin pregnancy  Jeffery Yi experience vaginal   bleeding about 3 days ago, evaluated in the emergency department  She has   experienced occasional nausea and vomiting  Her prenatal course otherwise   has been unremarkable so far  Jeffery Yi is a relatively poor historian  She has a history of an initial   pregnancy delivered by  section apparently at about 27 weeks   gestation in  for the apparent indication of IUGR  She says that she   delivered a 3 lbs  6 oz  baby girl who she tells me had a four-week    intensive care unit stay  Her second pregnancy delivered at term   by repeat  section in 2016 with a 6 lbs  8 oz  baby boy delivered  [facility] of her deliveries occurred at [facility]  Her second   pregnancy was complicated by a diagnosis of venous thromboembolism   apparently requiring full anticoagulation   It should be emphasized that we   have no medical records available with respect to this history available   for review at the time of her visit today  Rich Strickland tells me that her   children are healthy, though each is in foster care currently  Rich Strickland has had no additional episode of venous thromboembolism during her   lifetime  I am uncertain as to the extent of a thrombophilia work up which   may have been performed in the past given her history of VTE  She is   morbidly obese, with a current BMI of 43 6  She tells me that she has a   history of right hemiparesis, and that she is "paralyzed on my right   side", though she appears to have normal motor function during the visit   today  She believes that she may have had an in utero stroke  She is   currently not followed by a neurologist based upon this history  Her past   medical history is otherwise significant for bipolar disorder, not treated   medically currently  She takes no medication with the exception of a   prenatal vitamin on a daily basis  She has an allergy to penicillin  Her   past surgical history is otherwise significant for a cholecystectomy  She   denies tobacco, alcohol, or illicit drug use during the pregnancy  Her mom   apparently had a history of pulmonary embolism  There is no other first   degree family member with a diagnosis of venous thromboembolism  Today's ultrasound findings and suggested follow-up were discussed in   detail with Rich Strickland  The Stepwise Sequential Screen was discussed in   detail, including the sensitivities for detection of Down syndrome and   open neural tube defects for twins  Definitive prenatal diagnosis is   possible only through genetic amniocentesis or CVS  Rich Strickland was given a   requisition for [facility] for venous  blood collection for hCG   and DAO-A to complete the initial component of the Stepwise Sequential   Screen  Results should be available within one week  Follow-up multiple   marker serum screening at 16-18 weeks gestation is recommended to complete   the Sequential Screen    Fetal Level II ultrasound imaging is scheduled at   about 20 weeks gestation  Morbid obesity is associated with an increased risk for adverse pregnancy   outcomes, including gestational diabetes, fetal growth abnormalities   including macrosomia, fetal structural abnormalities, preeclampsia, venous   thromboembolism, stillbirth, and increased likelihood for    section  Early screening for gestational diabetes should be considered,   with repeat evaluation between 24 and 28 weeks gestation, if initially   negative  Jeffery Yi and I discussed that her twin pregnancy has an increased risk for   complications including spontaneous  birth, and indicated    birth related to preeclampsia or fetal growth restriction  I recommended   initiation of treatment with 81 mg of aspirin a day which was   significantly reduce her risk for the development of preeclampsia  Serial   fetal growth scans are recommended during the second half of the pregnancy   for the indications of a twin pregnancy, morbid obesity, and apparent   history of IUGR complicating a prior pregnancy  Additional folic acid and   iron supplementation is recommended given the increased requirements with   a twin pregnancy  Given Kristin's history of venous thromboembolism complicating a prior   pregnancy, we discussed that she has a significant risk for recurrence of   VTE during her current pregnancy  Accordingly, I recommended prophylactic   treatment with 40 mg of subcutaneous low molecular weight heparin twice a   day and sent a prescription to her pharmacy  I recommended calcium   supplementation, 1500 mg a day in divided doses, given an associated   increased risk for bone loss with long-term heparin therapy during   pregnancy  I recommended weekly evaluation of the maternal platelet count   during the initial 3 weeks of therapy in order to rule out the development   of heparin-associated thrombocytopenia   Unfortunately, Jeffery Yi left our office before a lab requisition could be given to her  Conversion to   unfractionated heparin is recommended at 36 weeks gestation which will   afford her an optimal opportunity to receive regional anesthesia during   her labor and delivery course  Venous thromboembolism prophylaxis is   recommended for at least 6 weeks after delivery  She should be evaluated   with full acquired and inherited thrombophilia work up given her history   of prior venous thromboembolism and a history of a first degree family   member with VTE  These studies were not ordered for Rich Strickland at her visit   today  We would appreciate if you could direct any relevant medical records from   [facility] to us for review with respect to the events   surrounding her prior pregnancies, once available  Finally, consideration should be given for referral of Rich Srtickland to   neurologist for further assessment given her aforementioned apparent   history of right hemiparesis  The face to face time, in addition to time spent discussing ultrasound   results, was approximately 30 minutes, greater than 50% of which was spent   during counseling and coordination of care  KIAH Patton , KIAH GATES C S  DOUG Leach     Maternal-Fetal Medicine   Electronically signed 02/25/17 17:46

## 2018-01-11 NOTE — MISCELLANEOUS
Message   Recorded as Task   Date: 05/23/2017 08:44 AM, Created By: Coy Curling   Task Name: Call Back   Assigned To: [facility],Team   Regarding Patient: Anitha Muro, Status: In Progress   CommentEllard Dinning - 23 May 2017 8:44 AM     TASK CREATED  Caller: Self; [phone] (Home)  pt needs a letter stating she cant take her antidepressants due to her pregnancy any quest call [phone] - 23 May 2017 9:13 AM     TASK IN PROGRESS   AmadouAmaury - 23 May 2017 9:15 AM     TASK EDITED  returned pts' p c - L/M for pt to return p c  prior to 4pm today  Amadou,Amaury - 23 May 2017 9:54 AM     TASK EDITED  pt's significant other returned call- states," Emil Hughes feels stressed every time she goes to court & would like to go back on antidepressant, Zoloft  " last took it 9/2016" was ordered by [facility] doctor" pt informed medication would have to be ordered by MD  he states, they have an apt at [address] [place] [address] in [place] today & will discuss at that apt   they also have a f/u pn apt on 6/5/17 in   Active Problems    1  Anxiety (300 00) (F41 9)   2  Bipolar disorder (296 80) (F31 9)   3  Birth Control   4  Chronic hypertension in obstetric context, second trimester (642 03) (O10 912)   5  Dichorionic diamniotic twin pregnancy in first trimester (651 03,V91 03) (O30 041)   6  Encounter for supervision of other normal pregnancy in second trimester (V22 1)   (Z34 82)   7  Encounter to determine fetal viability of pregnancy, fetus 1 (V23 87) (O36 80X1)   8  Encounter to determine fetal viability of pregnancy, fetus 2 (V23 87) (O36 80X2)   9  Hemiparesis, right (342 90) (G81 91)   10  History of allergy (V15 09) (Z88 9)   11  History of venous thromboembolism (V12 51) (Z86 718)   12  Late prenatal care affecting pregnancy (V23 7) (O09 30)   13  Obesity, unspecified obesity severity, unspecified obesity type (278 00) (E66 9)   14  Scabies (133 0) (B86)   15  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,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,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)   16  Urinary tract infection (599 0) (N39 0)   17  Vaginal bleeding in pregnancy, first trimester (640 93) (O20 9)    Current Meds   1  Aspirin 81 MG Oral Tablet Chewable; CHEW AND SWALLOW 1 TABLET DAILY; Therapy: 40SRQ1648 to (Evaluate:06Apr2017)  Requested for: 87RGK1501; Last   Rx:07Mar2017 Ordered   2  Aspirin Adult Low Strength 81 MG Oral Tablet Chewable; USE AS DIRECTED; Therapy: 41BCR0323 to Recorded   3  Enoxaparin Sodium 40 MG/0 4ML Subcutaneous Solution; INJECT 40 MG Twice daily   subcutaneous administration; Therapy: 98FKN9033 to (Evaluate:05Jun2017)  Requested for: 92LXI9213; Last   Rx:07Mar2017 Ordered   4  EpiPen 2-Trenton 0 3 MG/0 3ML JORDON; INJECT INTO THE UPPER OUTER THIGH AS   NEEDED FOR A SEVERE ALLERGIC REACTION; Therapy: 15Apr2014 to (Last Rx:15Apr2014)  Requested for: 15Apr2014 Ordered   5  Ferrous Sulfate 325 (65 Fe) MG Oral Tablet; TAKE 1 TABLET DAILY WITH FOOD; Therapy: 58WBY7497 to (Justin Soto)  Requested for: 97OUV9197; Last   Rx:07Mar2017 Ordered   6  Folic Acid 1 MG Oral Tablet; Take 1 tablet daily; Therapy: 96ZEK4718 to (Marcus Michael)  Requested for: 70EXQ2153; Last   CI:53OYM1591 Ordered   7  Prenatal Multivitamin + DHA 28-0 8 & 200 MG Oral Miscellaneous; TAKE DAILY AS   DIRECTED; Therapy: 09SYI6280 to (87 89 79)  Requested for: 46IZP7606; Last   Rx:07Mar2017 Ordered   8  Prenatal Vitamins TABS; Therapy: (Recorded:58Csf4892) to Recorded    Allergies    1  Penicillins    2  Bee sting   3  No Known Environmental Allergies   4   Other    Signatures   Electronically signed by : Jamal Bower RN; May 23 2017  9:55AM EST                       (Author)

## 2018-01-11 NOTE — MISCELLANEOUS
Message  called patient at phone number listed on communication consent, spoke with Steven Agustin ( listed on patient's contact list on consent for communication)  Per Rosa Fearing patient aware our office has been leaving messages and he states she has already been taking her injections  Upon further investigation he states patient's mother took Lovenox injections and she knows how to given them without coming in for teaching  per Thuan patient has been getting injections "in her arm", discussed appropriate places injections should be given for best absorption and why teaching is important as well as blood work needed to monitor the medication weekly  Thuan not sure if patient knows any of the information, he states he will have her call the office but not sure if she will come in  Active Problems    1  Amenorrhea (626 0) (N91 2)   2  Anxiety (300 00) (F41 9)   3  Bipolar disorder (296 80) (F31 9)   4  Bipolar disorder (296 80) (F31 9)   5  Birth Control   6  Dichorionic diamniotic twin pregnancy in first trimester (651 03,V91 03) (O30 041)   7  Earache (388 70) (H92 09)   8  Encounter for routine gynecological examination (V72 31) (Z01 419)   9  Encounter to determine fetal viability of pregnancy, fetus 1 (V23 87) (O36 80X1)   10  Encounter to determine fetal viability of pregnancy, fetus 2 (V23 87) (O36 80X2)   11  Hemiparesis (342 90) (G81 90)   12  Hemiparesis, right (342 90) (G81 91)   13  History of allergy (V15 09) (Z88 9)   14  History of venous thromboembolism (V12 51) (Z86 718)   15  Late prenatal care affecting pregnancy (V23 7) (O09 30)   16  Obesity, unspecified obesity severity, unspecified obesity type (278 00) (E66 9)   17  Pregnancy, obstetrical care (V22 1) (Z34 90)   18  Prenatal care, subsequent pregnancy in first trimester (V22 1) (Z34 81)   19  Scabies (133 0) (B86)   20  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 (908 96,597 27) (P91 0,I63 9)   21  Twin pregnancy in second trimester (651 03) (O30 002)   22   Twin pregnancy, twins dichorionic and diamniotic (651 00,V91 03) (O30 049)   21  Urinary tract infection (599 0) (N39 0)   24  Vaginal bleeding in pregnancy, first trimester (640 93) (O20 9)    Current Meds   1  Aspirin 81 MG Oral Tablet Chewable; CHEW AND SWALLOW 1 TABLET DAILY; Therapy: 68KDP0379 to (Evaluate:06Apr2017)  Requested for: 40IXI5621; Last   Rx:07Mar2017 Ordered   2  Aspirin Adult Low Strength 81 MG Oral Tablet Chewable; USE AS DIRECTED; Therapy: 92WWO3586 to Recorded   3  Enoxaparin Sodium 40 MG/0 4ML Subcutaneous Solution; INJECT 40 MG Twice daily   subcutaneous administration; Therapy: 27JNU3023 to (Evaluate:05Jun2017)  Requested for: 15YWZ2480; Last   Rx:07Mar2017 Ordered   4  EpiPen 2-Trenton 0 3 MG/0 3ML JORDON; INJECT INTO THE UPPER OUTER THIGH AS   NEEDED FOR A SEVERE ALLERGIC REACTION; Therapy: 15Apr2014 to (Last Rx:15Apr2014)  Requested for: 15Apr2014 Ordered   5  Ferrous Sulfate 325 (65 Fe) MG Oral Tablet; TAKE 1 TABLET DAILY WITH FOOD; Therapy: 75CQT2207 to (Socorro Mccann)  Requested for: 06AQC3371; Last   Rx:07Mar2017 Ordered   6  Folic Acid 1 MG Oral Tablet; Take 1 tablet daily; Therapy: 62SAP0571 to (Fernando Gant)  Requested for: 49UAR0311; Last   Rx:17Fwe2588 Ordered   7  Prenatal Multivitamin + DHA 28-0 8 & 200 MG Oral Miscellaneous; TAKE DAILY AS   DIRECTED; Therapy: 53DAL7551 to (Gee Muñiz)  Requested for: 26VLR8574; Last   Rx:07Mar2017 Ordered   8  Prenatal Vitamins TABS; Therapy: (Recorded:27Hyy7276) to Recorded    Allergies    1  Penicillins    2  Bee sting   3  No Known Environmental Allergies   4   Other    Signatures   Electronically signed by : Maurcie Prather, ; Mar 17 2017  3:14PM EST                       (Author)

## 2018-01-11 NOTE — PROGRESS NOTES
2017         RE: Peg Carias                                To: [facility]  MR#: [id number]                                   [address]   : [address]   ENC: [id number]:ANABELLEKIAH Malcolm, [address]   (Exam #: [id number])                           Fax: [phone]      The LMP of this 22year old,  G3, P1-1-0-2 patient was OCT 28 2016, her   working AIDAN is AUG 32 2017 and the current gestational age is 25 weeks 6   days by [address] [place] [address]  A sonographic examination was performed on 2017 using real time equipment  The ultrasound examination was   performed using abdominal & vaginal techniques  The patient has a BMI of   45 7  Her blood pressure today was 153/85        Earliest ultrasound found in her record: 2017  10w5d  17 AIDAN      Fetus A   Cardiac motion was observed at 151 bpm       Fetus B   Cardiac motion was observed at 153 bpm       INDICATIONS      multiple gestation - twins   morbid obesity      Exam Types      LEVEL II   Transvaginal      RESULTS      Fetus # 1 of 2   Vertex presentation   Fetal growth appeared normal   Fetal position = Inferior, Maternal Left   Placenta Location = Anterior   No placenta previa   Placenta Grade = I   Chorionicity = Dichorionic, Diamniotic      Fetus # 2 of 2   Vertex presentation   Fetal position = Superior, Maternal Right   Placenta Location = Posterior   Placenta Grade = I   Chorionicity = Dichorionic, Diamniotic      MEASUREMENTS (* Included In Average GA)      FETUS A   AC              15 4 cm        20 weeks 2 days* (39%)   BPD              4 7 cm        20 weeks 1 day * (31%)   HC              18 1 cm        20 weeks 3 days* (35%)   Femur            3 6 cm        21 weeks 4 days* (56%)      Nuchal Fold      4 8 mm   NBL              8 8 mm      Humerus          3 4 cm        21 weeks 4 days  (63%)      Cerebellum       2 0 cm 19 weeks 5 days   Biorbit          3 4 cm        21 weeks 6 days   CisternaMagna    5 4 mm      HC/AC           1 18   FL/AC           0 23   FL/BPD          0 76   EFW (Ac/Fl/Hc)   379 grams - 0 lbs 13 oz      Fetus A AVERAGE G  A  is 20 weeks 4 days +/- 10 days  FETUS B   AC              15 4 cm        20 weeks 2 days* (38%)   BPD              4 6 cm        19 weeks 6 days* (20%)   HC              17 6 cm        20 weeks 0 days* (22%)   Femur            3 2 cm        20 weeks 1 day * (30%)      Nuchal Fold      2 7 mm   NBL              6 4 mm      Humerus          3 3 cm        21 weeks 1 day   (51%)      Cerebellum       2 0 cm        19 weeks 5 days   CisternaMagna    6 3 mm      HC/AC           1 14   FL/AC           0 21   FL/BPD          0 70   EFW (Ac/Fl/Hc)   341 grams - 0 lbs 12 oz      Fetus B AVERAGE G  A  is 20 weeks 1 day +/- 10 days  AMNIOTIC FLUID      Fetus A      Largest Vertical Pocket = 3 6 cm   Amniotic Fluid: Normal         Fetus B      Largest Vertical Pocket = 3 7 cm   Amniotic Fluid: Normal      CERVICAL EVALUATION      SUPINE      Cervical Length: 4 30 cm      OTHER TEST RESULTS           Funneling?: No             Dynamic Changes?: No        Resp  To TFP?: No      ANATOMY      Fetus A   Head                                    Normal   Face/Neck                               Normal   Th  Cav  Normal   Heart                                   Normal   Abd  Cav  Normal   Stomach                                 Normal   Right Kidney                            Normal   Left Kidney                             Normal   Bladder                                 Normal   Abd  Wall                               Normal   Spine                                   Normal   Extrems                                 Normal   Genitalia                               Normal   Placenta                                Normal   Umbl   Cord Normal   Uterus                                  Normal   PCI                                     Normal      Fetus B   Head                                    Normal   Face/Neck                               Normal   Th  Cav  Normal   Heart                                   Normal   Abd  Cav  Normal   Stomach                                 Normal   Right Kidney                            Normal   Left Kidney                             Normal   Bladder                                 Normal   Abd  Wall                               Normal   Spine                                   Normal   Extrems                                 Normal   Genitalia                               Normal   Placenta                                Normal   Umbl  Cord                              Normal   Uterus                                  Normal   PCI                                     Normal      ANATOMY DETAILS      Fetus A   Visualized Appearing Sonographically Normal:   HEAD: (Calvarium, BPD Level, Cavum, Lateral Ventricles, Choroid Plexus,   Cerebellum, Cisterna Magna);    FACE/NECK: (Neck, Nuchal Fold, Profile,   Orbits, Nose/Lips, Palate, Face);    TH  CAV  : (Lungs, Diaphragm); HEART: (Four Chamber View, Proximal Left Outflow, Proximal Right Outflow,   3VV, 3 Vessel Trachea, Short Axis of Greater Vessels, Ductal Arch, Aortic   Arch, Interventricular Septum, Interatrial Septum, IVC, SVC, Cardiac Axis,   Cardiac Position);    ABD  CAV : (Liver);    STOMACH, RIGHT KIDNEY, LEFT   KIDNEY, BLADDER, ABD  WALL, SPINE: (Cervical Spine, Thoracic Spine, Lumbar   Spine, Sacrum);    EXTREMS: (Lt Humerus, Rt Humerus, Lt Forearm, Rt   Forearm, Lt Hand, Rt Hand, Lt Femur, Rt Femur, Lt Low Leg, Rt Low Leg, Lt   Foot, Rt Foot);    GENITALIA (Male), PLACENTA, UMBL   CORD, UTERUS, PCI      Fetus B   Visualized Appearing Sonographically Normal:   HEAD: (Calvarium, BPD Level, Cavum, Lateral Ventricles, Choroid Plexus,   Cerebellum, Cisterna Magna);    FACE/NECK: (Neck, Nuchal Fold, Profile,   Orbits, Nose/Lips, Palate, Face);    TH  CAV  : (Lungs, Diaphragm); HEART: (Four Chamber View, Proximal Left Outflow, Proximal Right Outflow,   3VV, 3 Vessel Trachea, Short Axis of Greater Vessels, Ductal Arch, Aortic   Arch, Interventricular Septum, Interatrial Septum, IVC, SVC, Cardiac Axis,   Cardiac Position);    ABD  CAV : (Liver);    STOMACH, RIGHT KIDNEY, LEFT   KIDNEY, BLADDER, ABD  WALL, SPINE: (Cervical Spine, Thoracic Spine, Lumbar   Spine, Sacrum);    EXTREMS: (Lt Humerus, Rt Humerus, Lt Forearm, Rt   Forearm, Lt Hand, Rt Hand, Lt Femur, Rt Femur, Lt Low Leg, Rt Low Leg, Lt   Foot, Rt Foot);    GENITALIA (Female), PLACENTA, UMBL  CORD, UTERUS, PCI      ADNEXA      The left ovary appeared normal and measured 3 0 x 3 0 x 1 9 cm with a   volume of 8 9 cc  The right ovary was not visualized  IMPRESSION      Twin IUP (Fetus A)   20 weeks and 4 days by this ultrasound  (AIDAN=SEP 2 2017)   20 weeks and 6 days by 1st Tri Sono  (AIDAN=AUG 31 2017)   Vertex presentation   Fetal growth appeared normal   Normal anatomy survey   Regular fetal heart rate of 151 bpm   Anterior placenta   No placenta previa   Fetal position = Inferior, Left   Dichorionic, diamniotic      Twin IUP (Fetus B)   20 weeks and 1 day by this ultrasound  (AIDAN=SEP 5 2017)   20 weeks and 6 days by 1st Tri Sono  (AIDAN=AUG 31 2017)   Vertex presentation   Normal anatomy survey   Regular fetal heart rate of 153 bpm   Posterior placenta   Fetal position = Superior, Right   Dichorionic, diamniotic      GENERAL COMMENT      On exam today the patient appears well, in no acute distress, and denies   any complaints  Her abdomen is non-tender  The patient was given a requisition for sequential screening by Dr Santi Livingston   which she did not complete  The fetal anatomic survey is complete on both twins    There is no sonographic evidence of fetal abnormalities at this time  Good fetal   movement and tone are seen on each twin  A transvaginal ultrasound was   performed to assess the cervix, which was not seen well transabdominally  The cervical length was 4 3 centimeters, which is normal for the current   gestational age  There was no significant funneling or dynamic changes   appreciated  The patient was informed of today's findings and all of her   questions were answered  The limitations of ultrasound were reviewed with   the patient, which she accepts  I reviewed the patient's previous obstetrical records  There is no   evidence that she had a thromboembolic event during her last pregnancy  Upon questioning the patient, she states that it was after she delivered  Her delivery summary indicates that she had an uncomplicated    course  This patient states she return to the hospital with a swollen leg   and was diagnosed with a blood clot  I do see evidence that a   thrombophilia panel was performed which was overall negative  Again,   there are no records to corroborate her blood clot  We discussed appropriate follow-up but I recommend she return in 4 weeks   for a fetal growth evaluation  Please note, in addition to the time spent discussing the results of the   ultrasound, I spent approximately 10 minutes of face-to-face time with the   patient, greater than 50% of which was spent in counseling and the   coordination of care for this patient  Thank you very much for allowing us to participate in the care of this   very nice patient  Should you have any questions, please do not hesitate   to contact our office  KIAH Beckman M D     Electronically signed 17 15:40

## 2018-01-11 NOTE — MISCELLANEOUS
Message   Recorded as Task   Date: 02/08/2017 10:49 AM, Created By: Claudetta Sane   Task Name: Care Coordination   Assigned To: [facility],Team   Regarding Patient: Thomas Adrian, Status: In Progress   Hollis Dhaliwal - 08 Feb 2017 10:49 AM     TASK CREATED  Needs prescription for prenatal vitamins  Susi Ally - 08 Feb 2017 11:07 AM     TASK IN PROGRESS   Susi Ally - 08 Feb 2017 11:10 AM     TASK EDITED  I spoke with siva - I told him any otc except "gummies"   If she wants and rx - call ins and find out what is covered and call back        Active Problems    1  Amenorrhea (626 0) (N91 2)   2  Anxiety (300 00) (F41 9)   3  Bipolar disorder (296 80) (F31 9)   4  Bipolar disorder (296 80) (F31 9)   5  Birth Control   6  Earache (388 70) (H92 09)   7  Encounter for routine gynecological examination (V72 31) (Z01 419)   8  Hemiparesis (342 90) (G81 90)   9  Hemiparesis, right (342 90) (G81 91)   10  History of allergy (V15 09) (Z88 9)   11  Obesity, unspecified obesity severity, unspecified obesity type (278 00) (E66 9)   12  Pregnancy, obstetrical care (V22 1) (Z34 90)   13  Prenatal care, subsequent pregnancy in first trimester (V22 1) (Z34 81)   14  Scabies (133 0) (B86)   15  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 (622 32,167 95) (P91 0,I63 9)   16  Twin pregnancy, twins dichorionic and diamniotic (651 00,V91 03) (O30 049)   16  Urinary tract infection (599 0) (N39 0)    Current Meds   1  Amoxicillin 500 MG Oral Tablet; TAKE 1 TABLET 3 TIMES DAILY; Therapy: 40PEW0765 to (Evaluate:25Jun2014)  Requested for: 21QSD3169; Last   Rx:18Jun2014 Ordered   2  Cefuroxime Axetil 500 MG Oral Tablet (Ceftin); TAKE 1 TABLET EVERY 12 HOURS   DAILY; Therapy: 73WZN0022 to (Evaluate:14Apr2014)  Requested for: 771 830 025; Last   Rx:04Apr2014 Ordered   3  EpiPen 2-Trenton 0 3 MG/0 3ML JORDON; INJECT INTO THE UPPER OUTER THIGH AS   NEEDED FOR A SEVERE ALLERGIC REACTION;    Therapy: 15Apr2014 to (Last Rx:15Apr2014) Requested for: 15Apr2014 Ordered   4  Folic Acid 1 MG Oral Tablet; Take 1 tablet daily; Therapy: 77SQR8664 to (Sandor Mate)  Requested for: 93UVX4535; Last   Rx:26Qds0258 Ordered   5  MedroxyPROGESTERone Acetate 150 MG/ML Intramuscular Suspension   (Depo-Provera); INJECT EVERY 12 WEEKS AS DIRECTED; Therapy: 01Apr2014 to (Last Rx:01Apr2014)  Requested for: 01Apr2014   Ordered   6  Permethrin 5 % External Cream (Elimite); APPLY AS DIRECTED; Therapy: 01Apr2014 to (Last Rx:15Apr2014)  Requested for: 15Apr2014 Ordered   7  Zoloft TABS (Sertraline HCl); Therapy: (Recorded:18Jun2014) to Recorded    Allergies    1  Penicillins    2  Bee sting   3  Bee sting   4  Other    Signatures   Electronically signed by :  Peg Sumner, ; Feb 8 2017 11:11AM EST                       (Author)

## 2018-01-11 NOTE — MISCELLANEOUS
Message  Left message for pt to return call regarding Lovenox teaching  Active Problems    1  Amenorrhea (626 0) (N91 2)   2  Anxiety (300 00) (F41 9)   3  Bipolar disorder (296 80) (F31 9)   4  Bipolar disorder (296 80) (F31 9)   5  Birth Control   6  Dichorionic diamniotic twin pregnancy in first trimester (651 03,V91 03) (O30 041)   7  Earache (388 70) (H92 09)   8  Encounter for routine gynecological examination (V72 31) (Z01 419)   9  Encounter to determine fetal viability of pregnancy, fetus 1 (V23 87) (O36 80X1)   10  Encounter to determine fetal viability of pregnancy, fetus 2 (V23 87) (O36 80X2)   11  Hemiparesis (342 90) (G81 90)   12  Hemiparesis, right (342 90) (G81 91)   13  History of allergy (V15 09) (Z88 9)   14  History of venous thromboembolism (V12 51) (Z86 718)   15  Late prenatal care affecting pregnancy (V23 7) (O09 30)   16  Obesity, unspecified obesity severity, unspecified obesity type (278 00) (E66 9)   17  Pregnancy, obstetrical care (V22 1) (Z34 90)   18  Prenatal care, subsequent pregnancy in first trimester (V22 1) (Z34 81)   19  Scabies (133 0) (B86)   20  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 (507 16,932 58) (P91 0,I63 9)   21  Twin pregnancy in second trimester (651 03) (O30 002)   22  Twin pregnancy, twins dichorionic and diamniotic (651 00,V91 03) (O30 049)   21  Urinary tract infection (599 0) (N39 0)   24  Vaginal bleeding in pregnancy, first trimester (640 93) (O20 9)    Current Meds   1  Aspirin 81 MG Oral Tablet Chewable; CHEW AND SWALLOW 1 TABLET DAILY; Therapy: 43MOR5678 to (Evaluate:06Apr2017)  Requested for: 55WQM2977; Last   Rx:07Mar2017 Ordered   2  Aspirin Adult Low Strength 81 MG Oral Tablet Chewable; USE AS DIRECTED; Therapy: 22LOS5218 to Recorded   3  Enoxaparin Sodium 40 MG/0 4ML Subcutaneous Solution; INJECT 40 MG Twice daily   subcutaneous administration; Therapy: 65ZLD6005 to (Evaluate:05Jun2017)  Requested for: 50KGL6661; Last   Rx:07Mar2017 Ordered   4   EpiPen 2-Trenton 0 3 MG/0 3ML JORDON; INJECT INTO THE UPPER OUTER THIGH AS   NEEDED FOR A SEVERE ALLERGIC REACTION; Therapy: 16Ieh2007 to (Last Rx:15Apr2014)  Requested for: 15Apr2014 Ordered   5  Ferrous Sulfate 325 (65 Fe) MG Oral Tablet; TAKE 1 TABLET DAILY WITH FOOD; Therapy: 51OTC0665 to (Janeal Stagers)  Requested for: 70PNX2140; Last   Rx:07Mar2017 Ordered   6  Folic Acid 1 MG Oral Tablet; Take 1 tablet daily; Therapy: 22PKL3999 to (95 976952)  Requested for: 55EWG2431; Last   Rx:96Ija3678 Ordered   7  Prenatal Multivitamin + DHA 28-0 8 & 200 MG Oral Miscellaneous; TAKE DAILY AS   DIRECTED; Therapy: 27ONW3843 to ((46) 1946 1020)  Requested for: 04TQY7752; Last   Rx:07Mar2017 Ordered   8  Prenatal Vitamins TABS; Therapy: (Recorded:11Xby7147) to Recorded    Allergies    1  Penicillins    2  Bee sting   3  No Known Environmental Allergies   4   Other    Signatures   Electronically signed by : Marley Narvaez RN; Mar 10 2017  3:07PM EST                       (Author)

## 2018-01-12 ENCOUNTER — GENERIC CONVERSION - ENCOUNTER (OUTPATIENT)
Dept: OTHER | Facility: OTHER | Age: 23
End: 2018-01-12

## 2018-01-12 ENCOUNTER — ALLSCRIPTS OFFICE VISIT (OUTPATIENT)
Dept: OTHER | Facility: OTHER | Age: 23
End: 2018-01-12

## 2018-01-12 VITALS
BODY MASS INDEX: 45.31 KG/M2 | DIASTOLIC BLOOD PRESSURE: 86 MMHG | HEIGHT: 61 IN | SYSTOLIC BLOOD PRESSURE: 136 MMHG | WEIGHT: 240 LBS

## 2018-01-12 VITALS
SYSTOLIC BLOOD PRESSURE: 153 MMHG | HEIGHT: 61 IN | BODY MASS INDEX: 45.69 KG/M2 | DIASTOLIC BLOOD PRESSURE: 85 MMHG | WEIGHT: 242 LBS

## 2018-01-12 DIAGNOSIS — Z87.59 PERSONAL HISTORY OF OTHER COMPLICATIONS OF PREGNANCY, CHILDBIRTH AND THE PUERPERIUM: ICD-10-CM

## 2018-01-12 DIAGNOSIS — Z34.90 ENCOUNTER FOR SUPERVISION OF NORMAL PREGNANCY: ICD-10-CM

## 2018-01-12 DIAGNOSIS — O14.13 SEVERE PRE-ECLAMPSIA IN THIRD TRIMESTER: ICD-10-CM

## 2018-01-12 NOTE — PROGRESS NOTES
2017         RE: Lupis Moon                                To: [facility]  MR#: [id number]                                   [address]   : [address]   ENC:                                              [facility] [place] [address]   (Exam #: [id number])                           Fax: [phone]      The LMP of this 22year old,  G3, P1-1-0-2 patient was OCT 28 2016, her   working AIDAN is AUG 32 2017 and the current gestational age is 31 weeks 0   days by [address] [place] [address]  A sonographic examination was performed on 2017 using real time equipment  The ultrasound examination was   performed using abdominal technique  Earliest ultrasound found in her record: 2017  10w5d  17 AIDAN               Estela Bojorquez is currently an inpatient on Labor and Delivery receiving   management and care of her dichorionic twin pregnancy complicated by IUGR   and abnormal Doppler studies with respect to fetus "A", along with a   diagnosis of preeclampsia with severe features  Fetus A   Cardiac motion was observed at 134 bpm       Fetus B   Cardiac motion was observed at 138 bpm       INDICATIONS      intrauterine growth restriction   multiple gestation  diamniotic dichorionic twins   non reactive NST      Exam Types      Doppler study   Biophysical Profile      RESULTS      Fetus # 1 of 2   Vertex presentation   Fetal position = Maternal Left   Placenta Location = Anterior   No placenta previa   Placenta Grade = I      Fetus # 2 of 2   Breech presentation   Fetal position = Maternal Right   Placenta Location = Anterior   No placenta previa   Placenta Grade = I      Fetus A   The NST was non-reactive with no decelerations        AMNIOTIC FLUID      Fetus A      Largest Vertical Pocket = 2 2 cm   Amniotic Fluid: Normal         Fetus B      Largest Vertical Pocket = 5 6 cm   Amniotic Fluid: Normal      FETAL VESSELS      Fetus A S/D   PI    RI    PSV   AEDV RF                                                    cm/s       Umbilical Artery                 2 62              Yes  No       Middle Cerebral Artery           1 06      FETAL VESSELS      Fetus A                                 PVSys PVDia PASys  S/D   S/A   DVI   RF       Ductus Venosus:                                                No      BIOPHYSICAL PROFILE      Fetus A   The Biophysical Profile score was 8/10  Breathin  Movement: 2  Tone: 2  AFV: 2  NST: 0   The NST was non-reactive with no decelerations  IMPRESSION      Twin IUP (Fetus A)   30 weeks and 0 days by 1st Tri Sono  (AIDAN=AUG 31 2017)   Vertex presentation   Fetal heart rate of 134 bpm   Anterior placenta   No placenta previa   Fetal position = Maternal , Left      Twin IUP (Fetus B)   30 weeks and 0 days by 1st Tri Sono  (AIDAN=AUG 31 2017)   Breech presentation   Fetal heart rate of 138 bpm   Anterior placenta   No placenta previa   Fetal position = Maternal , Right      GENERAL COMMENT      Salome Smith was evaluated in the [address] [place] [address] this afternoon for   biophysical profile and Doppler evaluation of fetus "A", the growth   restricted twin with a non-reactive NST today  The composite biophysical   profile score is 8 out of 10  The fetal umbilical artery Doppler study is   abnormal, with absent end diastolic velocity, though no episode of reverse   diastolic flow is demonstrated  The middle cerebral artery Doppler study   is abnormal, with a decreased pulsatility index, indicating redistribution   of flow to the cerebral circulation  The cerebroplacental ratio is   abnormal   There is no reversal of the ductus venosus a wave  Umbilical   vein pulsations are not present  Underlying severe placental ischemic   disease is suspected  KIAH Rendon , R DOUG Saha     Maternal-Fetal Medicine   Electronically signed 17 18:24

## 2018-01-13 VITALS
DIASTOLIC BLOOD PRESSURE: 94 MMHG | HEIGHT: 61 IN | WEIGHT: 234 LBS | SYSTOLIC BLOOD PRESSURE: 154 MMHG | BODY MASS INDEX: 44.18 KG/M2

## 2018-01-13 VITALS
WEIGHT: 234 LBS | HEIGHT: 61 IN | DIASTOLIC BLOOD PRESSURE: 84 MMHG | BODY MASS INDEX: 44.18 KG/M2 | SYSTOLIC BLOOD PRESSURE: 128 MMHG

## 2018-01-13 NOTE — PROGRESS NOTES
2017         RE: Toño Mejia                                To: [facility]  MR#: [id number]                                   [address]   : [address]   ENC: [id number]                             [place], [address]   (Exam #: [id number])                           Fax: [phone]      The LMP of this 22year old,  G3, P1-1-0-2 patient was OCT 28 2016, her   working AIDAN is AUG 32 2017 and the current gestational age is 33 weeks 5   days by 1st Trimester Sono  A sonographic examination was performed on 2017 using real time equipment  The ultrasound examination was   performed using abdominal technique  The patient has a BMI of 53 5  Her   initial blood pressure today was 144/86, and it was later recorded at   160/101  Earliest ultrasound found in her record: 2017  10w5d  17 AIDAN         Problem list:   1  Dichorionic Diamniotic twin pregnancy   2  Symmetric IUGR noted on baby A with oligohydramnios and absent end   diastolic flow on umbilical dopplers noted since 28 weeks  She is s/p   steroids -   3  BMI >50- hgba1c in early pregnancy was 4 8%   4  Hx of CS x 2   5  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   6  Prior VTE in pregnancy on Lovenox 40 mg q 12 hrs  7  Poor social issues - her other children have been adopted  8  Patient denies hx of HTN but her 14 week BP was 140/90  Baseline   preeclamptic labs were normal  She is on baby aspirin daily  9  She did not complete genetic screening   10  Severe pedal edema noted at 29 weeks- 22 lb weight gain in 1 month   11  Hx of a 27 week delivery for IUGR and lack of fetal growth per patient        Fetus A   Cardiac motion was observed at 139 bpm       Fetus B   Cardiac motion was observed at 148 bpm       INDICATIONS      intrauterine growth restriction   morbid obesity   multiple gestation  diamniotic dichorionic twins      Exam Types      Amniotic Fluid Index   Biophysical Profile      RESULTS      Fetus # 1 of 2   Vertex presentation   Fetal position = Inferior, Maternal Left   Placenta Location = Anterior   No placenta previa   Placenta Grade = II      Fetus # 2 of 2   Breech presentation   Fetal position = Superior, Maternal Right   Placenta Location = Posterior   No placenta previa   Placenta Grade = II      Fetus A   The NST was reactive with no decelerations  Fetus B   The NST was reactive with no decelerations  AMNIOTIC FLUID      Fetus A      Largest Vertical Pocket = 4 8 cm   Amniotic Fluid: Normal         Fetus B      Largest Vertical Pocket = 5 3 cm   Amniotic Fluid: Normal      FETAL VESSELS      Fetus A                                  S/D   PI    RI    PSV   AEDV RF                                                    cm/s       Umbilical Artery           1158 42 2 71  1 00        Yes  No       Middle Cerebral Artery     7 16  1 56  0 81        No         Fetus B                                  S/D   PI    RI    PSV   AEDV RF                                                    cm/s       Umbilical Artery                 1 40              Yes      ANATOMY COMMENTS      FETUS A   The middle cerebral artery dopplers are normal for gestational age  There   is no cephalization of flow seen  The umbilical artery dopplers are   abnormal for gestational age with AEDV  The ductal flows are difficult to   see well to measure accurately  Baby A is active with a full bladder and   breathing is seen  The interdividing membrane is difficult to see well to   the umbilical dopplers were sampled at the umbilicus  FETUS B   The umbilical artery dopplers are normal for gestational age  BIOPHYSICAL PROFILE      Fetus A   The Biophysical Profile score was 10/10  Breathin  Movement: 2  Tone: 2  AFV: 2  NST: 2   The NST was reactive with no decelerations        Fetus B   The Biophysical Profile score was 10/10  Breathin  Movement: 2  Tone: 2  AFV: 2  NST: 2   The NST was reactive with no decelerations  IMPRESSION      Twin IUP (Fetus A)   29 weeks and 5 days by 1st Tri Sono  (AIDAN=AUG 31 2017)   Vertex presentation   Regular fetal heart rate of 139 bpm   Anterior placenta   No placenta previa   Fetal position = Inferior, Left      Twin IUP (Fetus B)   29 weeks and 5 days by 1st Tri Sono  (AIDAN=AUG 31 2017)   Breech presentation   Regular fetal heart rate of 148 bpm   Posterior placenta   No placenta previa   Fetal position = Superior, Right      GENERAL COMMENT      Sent to L/D for rule out preeclampsia based on her worsening BP, new onset   severe pedal edema and 22 lb weight gain in 1 month that she reports did   not develop in her other pregnancies  This pregnancy has severe IUGR in   baby A with AEDV in the umbilical dopplers  This is a risk factor for her   to develop preeclampsia  In review of her chart she has not completed her diabetes screening nor   her thrombophilia panel  Will have some of these labs drawn while she is   in labor and delivery  Anticardiolipin antibodies   Beta 2 glycoprotein   Lupus anticoagulant-this can not be drawn when she is on aspirin and   lovenox   Prothrombin gene   Factor V Leiden gene   Free Protein S Levels This may be affected by pregnancy so lower limits   are needed  < 24 in the third trimester may be a sign of a true deficiency   Protein C Functional assay   Antithrombin III Functional assay this will be affected by her lovenox and   should be drawn when she is off of lovenox/ heparin      At the age of 22, her edema is likely from pregnancy and GHTN or   preeclampsia  Would consider though an EKG to look for signs of   cardiomegaly and if abnormal she may need an echo  Face to face time was 15 min      KIAH Logan M D     Maternal-Fetal Medicine   Electronically signed 17 17:39

## 2018-01-13 NOTE — MISCELLANEOUS
Message  Pt did not complete part 1 of Sequential screen  Will notify [facility]-pt may need a quad screen  Active Problems    1  Anxiety (300 00) (F41 9)   2  Bipolar disorder (296 80) (F31 9)   3  Birth Control   4  Dichorionic diamniotic twin pregnancy in first trimester (651 03,V91 03) (O30 041)   5  Encounter for supervision of other normal pregnancy in second trimester (V22 1)   (Z34 82)   6  Encounter to determine fetal viability of pregnancy, fetus 1 (V23 87) (O36 80X1)   7  Encounter to determine fetal viability of pregnancy, fetus 2 (V23 87) (O36 80X2)   8  Hemiparesis, right (342 90) (G81 91)   9  History of allergy (V15 09) (Z88 9)   10  History of venous thromboembolism (V12 51) (Z86 718)   11  Late prenatal care affecting pregnancy (V23 7) (O09 30)   12  Obesity, unspecified obesity severity, unspecified obesity type (278 00) (E66 9)   13  Pre-eclampsia in second trimester (642 43) (O14 92)   14  Scabies (133 0) (B86)   15  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,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,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)   16  Urinary tract infection (599 0) (N39 0)   17  Vaginal bleeding in pregnancy, first trimester (640 93) (O20 9)    Current Meds   1  Aspirin 81 MG Oral Tablet Chewable; CHEW AND SWALLOW 1 TABLET DAILY; Therapy: 52TIZ2203 to (Evaluate:06Apr2017)  Requested for: 20PDE2982; Last   Rx:07Mar2017 Ordered   2  Aspirin Adult Low Strength 81 MG Oral Tablet Chewable; USE AS DIRECTED; Therapy: 74JAU9481 to Recorded   3  Enoxaparin Sodium 40 MG/0 4ML Subcutaneous Solution; INJECT 40 MG Twice daily   subcutaneous administration; Therapy: 56VHZ8152 to (Evaluate:05Jun2017)  Requested for: 95GYX7189; Last   Rx:07Mar2017 Ordered   4  EpiPen 2-[place]rent[place] 0 3 MG/0 3ML JORDON; INJECT INTO THE UPPER OUTER THIGH AS   NEEDED FOR A SEVERE ALLERGIC REACTION; Therapy: 15Apr2014 to (Last Rx:15Apr2014)  Requested for: 15Apr2014 Ordered   5  Ferrous Sulfate 325 (65 Fe) MG Oral Tablet; TAKE 1 TABLET DAILY WITH FOOD;    Therapy: 19JQO5622 to Jazzy Loyd) Requested for: 51ENI3205; Last   Rx:07Mar2017 Ordered   6  Folic Acid 1 MG Oral Tablet; Take 1 tablet daily; Therapy: 75RVQ5036 to (87 47 98)  Requested for: 56MVB2650; Last   Rx:07Feb2017 Ordered   7  Prenatal Multivitamin + DHA 28-0 8 & 200 MG Oral Miscellaneous; TAKE DAILY AS   DIRECTED; Therapy: 95EZZ9166 to (Melda Knee)  Requested for: 21JRO1609; Last   Rx:07Mar2017 Ordered   8  Prenatal Vitamins TABS; Therapy: (Recorded:08Rip9304) to Recorded    Allergies    1  Penicillins    2  Bee sting   3  No Known Environmental Allergies   4   Other    Signatures   Electronically signed by : Caitlin Ceballos RN; Apr 13 2017 11:06AM EST                       (Author)

## 2018-01-13 NOTE — MISCELLANEOUS
Message  Pt came to [facility] for 2nd betamethasone injection and stated she lost balance a few times today and fell-stated she did not hit abdomen -landed on buttocks  Pt denied any pain cramping leaking of fluid or vaginal bleeding  Pt stated fetus' active after falling and currently feeling the babies move  Discussed with Dr Nargis Matthew -stated no intervention needed at this time-pt has BPP scheduled for Friday at [facility] [place] [facility] office  Pt aware to contact OB if any of the above signs or symptoms occur-verbalized understanding  Pt had consultation with Dr Williams Galaviz today as well  Spoke with Jacques Davidson at [facility] office to update regarding above  Active Problems    1  Anxiety (300 00) (F41 9)   2  Bipolar disorder (296 80) (F31 9)   3  Birth Control   4  Chronic hypertension in obstetric context, second trimester (642 03) (O10 912)   5  Dichorionic diamniotic twin pregnancy in first trimester (651 03,V91 03) (O30 041)   6  Encounter for supervision of other normal pregnancy in second trimester (V22 1)   (Z34 82)   7  Encounter to determine fetal viability of pregnancy, fetus 1 (V23 87) (O36 80X1)   8  Encounter to determine fetal viability of pregnancy, fetus 2 (V23 87) (O36 80X2)   9  Hemiparesis, right (342 90) (G81 91)   10  History of allergy (V15 09) (Z88 9)   11  History of venous thromboembolism (V12 51) (Z86 718)   12  Late prenatal care affecting pregnancy (V23 7) (O09 30)   13  Obesity, unspecified obesity severity, unspecified obesity type (278 00) (E66 9)   14  Poor fetal growth affecting management of mother, second trimester, fetus 1 (656 53)    (K16 0084)   13  Scabies (133 0) (B86)   16  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 (171 92,317 17) (P91 0,I63 9)   17  Urinary tract infection (599 0) (N39 0)   18  Vaginal bleeding in pregnancy, first trimester (640 93) (O20 9)    Current Meds   1  Aspirin 81 MG Oral Tablet Chewable; CHEW AND SWALLOW 1 TABLET DAILY;    Therapy: 26TYM4932 to (Evaluate:06Apr2017)  Requested for: 21PMS8531; Last   Rx:07Mar2017 Ordered   2  Aspirin Adult Low Strength 81 MG Oral Tablet Chewable; USE AS DIRECTED; Therapy: 34WSC1052 to Recorded   3  Betamethasone Sod Phos & Acet 6 (3-3) MG/ML Injection Suspension; 12mg im repeat in   24 hours; Therapy: 28FYS5074 to (Last Rx:13Jun2017) Ordered   4  Enoxaparin Sodium 40 MG/0 4ML Subcutaneous Solution; INJECT 40 MG Twice daily   subcutaneous administration; Therapy: 71ACR5227 to (Evaluate:05Jun2017)  Requested for: 94LCG1309; Last   Rx:07Mar2017 Ordered   5  EpiPen 2-Trenton 0 3 MG/0 3ML JORDON; INJECT INTO THE UPPER OUTER THIGH AS   NEEDED FOR A SEVERE ALLERGIC REACTION; Therapy: 11Fln5917 to (Last Rx:15Apr2014)  Requested for: 15Apr2014 Ordered   6  Ferrous Sulfate 325 (65 Fe) MG Oral Tablet; TAKE 1 TABLET DAILY WITH FOOD; Therapy: 83WDU4935 to (Oleta Pouch)  Requested for: 91VIH4254; Last   Rx:07Mar2017 Ordered   7  Folic Acid 1 MG Oral Tablet; Take 1 tablet daily; Therapy: 92VBI9643 to (Ju Libman)  Requested for: 93IKH7206; Last   WN:99EWI8765 Ordered   8  Prenatal Multivitamin + DHA 28-0 8 & 200 MG Oral Miscellaneous; TAKE DAILY AS   DIRECTED; Therapy: 50NEE4136 to (Jeanine Garima)  Requested for: 96JGW0217; Last   Rx:07Mar2017 Ordered   9  Prenatal Vitamins TABS; Therapy: (Recorded:39Ayt0489) to Recorded    Allergies    1  Penicillins    2  Bee sting   3  No Known Environmental Allergies   4   Other    Plan  Unlinked    · Betamethasone Sod Phos & Acet 6 (3-3) MG/ML Injection Suspension    Signatures   Electronically signed by : Ava Barton RN; Jun 14 2017  3:52PM EST                       (Author)

## 2018-01-14 VITALS
DIASTOLIC BLOOD PRESSURE: 84 MMHG | SYSTOLIC BLOOD PRESSURE: 153 MMHG | WEIGHT: 283.4 LBS | HEIGHT: 61 IN | BODY MASS INDEX: 53.51 KG/M2

## 2018-01-14 VITALS
WEIGHT: 248 LBS | BODY MASS INDEX: 46.82 KG/M2 | HEIGHT: 61 IN | SYSTOLIC BLOOD PRESSURE: 158 MMHG | DIASTOLIC BLOOD PRESSURE: 100 MMHG

## 2018-01-14 VITALS
BODY MASS INDEX: 51.47 KG/M2 | WEIGHT: 272.6 LBS | SYSTOLIC BLOOD PRESSURE: 110 MMHG | HEIGHT: 61 IN | DIASTOLIC BLOOD PRESSURE: 70 MMHG

## 2018-01-14 VITALS
HEIGHT: 61 IN | DIASTOLIC BLOOD PRESSURE: 96 MMHG | WEIGHT: 266 LBS | SYSTOLIC BLOOD PRESSURE: 148 MMHG | BODY MASS INDEX: 50.22 KG/M2

## 2018-01-14 VITALS
SYSTOLIC BLOOD PRESSURE: 117 MMHG | WEIGHT: 230.6 LBS | BODY MASS INDEX: 43.54 KG/M2 | DIASTOLIC BLOOD PRESSURE: 82 MMHG | HEIGHT: 61 IN

## 2018-01-14 VITALS
WEIGHT: 230 LBS | HEIGHT: 61 IN | BODY MASS INDEX: 43.43 KG/M2 | DIASTOLIC BLOOD PRESSURE: 94 MMHG | SYSTOLIC BLOOD PRESSURE: 140 MMHG

## 2018-01-14 NOTE — MISCELLANEOUS
Message  Received authorization for a 30 day supply of Lovenox auth number [id number]  Pt spouse answered phone and stated pt will be home in pm  Will return call to pt to discuss Lovenox injection therapy with her  Active Problems    1  Amenorrhea (626 0) (N91 2)   2  Anxiety (300 00) (F41 9)   3  Bipolar disorder (296 80) (F31 9)   4  Bipolar disorder (296 80) (F31 9)   5  Birth Control   6  Dichorionic diamniotic twin pregnancy in first trimester (651 03,V91 03) (O30 041)   7  Earache (388 70) (H92 09)   8  Encounter for routine gynecological examination (V72 31) (Z01 419)   9  Encounter to determine fetal viability of pregnancy, fetus 1 (V23 87) (O36 80X1)   10  Encounter to determine fetal viability of pregnancy, fetus 2 (V23 87) (O36 80X2)   11  Hemiparesis (342 90) (G81 90)   12  Hemiparesis, right (342 90) (G81 91)   13  History of allergy (V15 09) (Z88 9)   14  History of venous thromboembolism (V12 51) (Z86 718)   15  Late prenatal care affecting pregnancy (V23 7) (O09 30)   16  Obesity, unspecified obesity severity, unspecified obesity type (278 00) (E66 9)   17  Pregnancy, obstetrical care (V22 1) (Z34 90)   18  Prenatal care, subsequent pregnancy in first trimester (V22 1) (Z34 81)   19  Scabies (133 0) (B86)   20  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 (417 93,223 01) (P91 0,I63 9)   21  Twin pregnancy in second trimester (651 03) (O30 002)   22  Twin pregnancy, twins dichorionic and diamniotic (651 00,V91 03) (O30 049)   21  Urinary tract infection (599 0) (N39 0)   24  Vaginal bleeding in pregnancy, first trimester (640 93) (O20 9)    Current Meds   1  Aspirin 81 MG Oral Tablet Chewable; CHEW AND SWALLOW 1 TABLET DAILY; Therapy: 71BEH5133 to (Evaluate:06Apr2017)  Requested for: 37PHZ1092; Last   Rx:07Mar2017 Ordered   2  Aspirin Adult Low Strength 81 MG Oral Tablet Chewable; USE AS DIRECTED; Therapy: 07OZZ0867 to Recorded   3   Enoxaparin Sodium 40 MG/0 4ML Subcutaneous Solution; INJECT 40 MG Twice daily subcutaneous administration; Therapy: 17IAG2921 to (Evaluate:05Jun2017)  Requested for: 77QVW0956; Last   Rx:07Mar2017 Ordered   4  EpiPen 2-Trenton 0 3 MG/0 3ML JORDON; INJECT INTO THE UPPER OUTER THIGH AS   NEEDED FOR A SEVERE ALLERGIC REACTION; Therapy: 77Yyg5342 to (Last Rx:15Apr2014)  Requested for: 15Apr2014 Ordered   5  Ferrous Sulfate 325 (65 Fe) MG Oral Tablet; TAKE 1 TABLET DAILY WITH FOOD; Therapy: 15GZH0622 to (Dia Márquez)  Requested for: 12PWJ3382; Last   Rx:07Mar2017 Ordered   6  Folic Acid 1 MG Oral Tablet; Take 1 tablet daily; Therapy: 34WGS1302 to (26 973491)  Requested for: 75ELO8461; Last   Rx:25Bih7445 Ordered   7  Prenatal Multivitamin + DHA 28-0 8 & 200 MG Oral Miscellaneous; TAKE DAILY AS   DIRECTED; Therapy: 55ODY8018 to (Bhumika Massey)  Requested for: 75TMD6076; Last   Rx:07Mar2017 Ordered   8  Prenatal Vitamins TABS; Therapy: (Recorded:27Fia8860) to Recorded    Allergies    1  Penicillins    2  Bee sting   3  No Known Environmental Allergies   4   Other    Signatures   Electronically signed by : La Barlow RN; Mar  9 2017  9:23AM EST                       (Author)

## 2018-01-15 VITALS
SYSTOLIC BLOOD PRESSURE: 153 MMHG | BODY MASS INDEX: 52.5 KG/M2 | HEIGHT: 61 IN | DIASTOLIC BLOOD PRESSURE: 94 MMHG | WEIGHT: 278.04 LBS

## 2018-01-15 NOTE — PROGRESS NOTES
2017         RE: Toño Mejia                                To: [facility]  MR#: [id number]                                   [address]   : [address]   ENC:                                              [address]   (Exam #: [id number])                           Fax: [phone]      The LMP of this 22year old,  G3, P1-1-0-2 patient was OCT 28 2016, her   working AIDAN is AUG 32 2017 and the current gestational age is 33 weeks 1   day by [address] [place] [address] [address] [place] [address]  A sonographic examination was performed on 2017 using real time equipment  The ultrasound examination was   performed using abdominal technique  The patient has a BMI of 52 5  Her   blood pressure today was 153/94  Earliest ultrasound found in her record: 2017  10w5d  17 AIDAN      Fetus A   Cardiac motion was observed at 141 bpm       Fetus B   Cardiac motion was observed at 148 bpm       INDICATIONS      multiple gestation - twins   intrauterine growth restriction      Exam Types      Biophysical Profile      RESULTS      Fetus # 1 of 2   Vertex presentation   Fetal position = Maternal Left   Placenta Location = Anterior   No placenta previa   Placenta Grade = II   Chorionicity = Dichorionic, Diamniotic      Fetus # 2 of 2   Breech presentation   Fetal position = Maternal Right   Placenta Location = Posterior   No placenta previa   Placenta Grade = II   Chorionicity = Dichorionic, Diamniotic      AMNIOTIC FLUID      Fetus A      Largest Vertical Pocket = 2 5 cm   Amniotic Fluid: Normal         Fetus B      Largest Vertical Pocket = 7 5 cm   Amniotic Fluid: Normal      FETAL VESSELS      Fetus A                                  S/D   PI    RI    PSV   AEDV RF                                                    cm/s       Umbilical Artery                 1  10              No   No       Middle Cerebral Artery           0 72      BIOPHYSICAL PROFILE      Fetus A   The Biophysical Profile score was 8/8  Breathin  Movement: 2  Tone: 2  AFV: 2      IMPRESSION      Twin IUP (Fetus A)   29 weeks and 1 day by 1st Tri Sono  (AIDAN=AUG 31 2017)   Vertex presentation   Fetal heart rate of 141 bpm   Anterior placenta   No placenta previa   Fetal position = Maternal , Left   Dichorionic, diamniotic      Twin IUP (Fetus B)   29 weeks and 1 day by 1st Tri Sono  (AIDAN=AUG 31 2017)   Breech presentation   Regular fetal heart rate of 148 bpm   Posterior placenta   No placenta previa   Fetal position = Maternal , Right   Dichorionic, diamniotic      GENERAL COMMENT      The biophysical profile score of fetus "A" is normal   The   cerebroplacental ratio is abnormal  Underlying placental ischemic disease   is likely present given the abnormal Doppler study and recent   identification of IUGR  Maria E Diandra refused to extend her visit in the   [facility]  today to include non stress testing  RECOMMENDATION      Fetal Dopplers: 1 Week   Growth Ultrasound: 1 Week   BPP: 2X per week      KIAH Mascorro M D     Maternal-Fetal Medicine   Electronically signed 17 16:46

## 2018-01-15 NOTE — MISCELLANEOUS
Message  left detailed voice mail message for patient at number listed on communication consent, requested patient to call [facility] office nurse line as soon as possible to discuss Lovenx injections and lab work ordered  Phone number for [facility] nurse office given  Active Problems    1  Amenorrhea (626 0) (N91 2)   2  Anxiety (300 00) (F41 9)   3  Bipolar disorder (296 80) (F31 9)   4  Bipolar disorder (296 80) (F31 9)   5  Birth Control   6  Dichorionic diamniotic twin pregnancy in first trimester (651 03,V91 03) (O30 041)   7  Earache (388 70) (H92 09)   8  Encounter for routine gynecological examination (V72 31) (Z01 419)   9  Encounter to determine fetal viability of pregnancy, fetus 1 (V23 87) (O36 80X1)   10  Encounter to determine fetal viability of pregnancy, fetus 2 (V23 87) (O36 80X2)   11  Hemiparesis (342 90) (G81 90)   12  Hemiparesis, right (342 90) (G81 91)   13  History of allergy (V15 09) (Z88 9)   14  History of venous thromboembolism (V12 51) (Z86 718)   15  Late prenatal care affecting pregnancy (V23 7) (O09 30)   16  Obesity, unspecified obesity severity, unspecified obesity type (278 00) (E66 9)   17  Pregnancy, obstetrical care (V22 1) (Z34 90)   18  Prenatal care, subsequent pregnancy in first trimester (V22 1) (Z34 81)   19  Scabies (133 0) (B86)   20  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,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,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)   21  Twin pregnancy in second trimester (651 03) (O30 002)   22  Twin pregnancy, twins dichorionic and diamniotic (651 00,V91 03) (O30 049)   21  Urinary tract infection (599 0) (N39 0)   24  Vaginal bleeding in pregnancy, first trimester (640 93) (O20 9)    Current Meds   1  Aspirin 81 MG Oral Tablet Chewable; CHEW AND SWALLOW 1 TABLET DAILY; Therapy: 79KUK7186 to (Evaluate:06Apr2017)  Requested for: 02PNE9033; Last   Rx:07Mar2017 Ordered   2  Aspirin Adult Low Strength 81 MG Oral Tablet Chewable; USE AS DIRECTED; Therapy: 29JKM1867 to Recorded   3   Enoxaparin Sodium 40 MG/0 4ML Subcutaneous Solution; INJECT 40 MG Twice daily   subcutaneous administration; Therapy: 42FJX8867 to (Evaluate:05Jun2017)  Requested for: 73QJI1774; Last   Rx:07Mar2017 Ordered   4  EpiPen 2-[place] 0 3 MG/0 3ML JORDON; INJECT INTO THE UPPER OUTER THIGH AS   NEEDED FOR A SEVERE ALLERGIC REACTION; Therapy: 15Apr2014 to (Last Rx:15Apr2014)  Requested for: 15Apr2014 Ordered   5  Ferrous Sulfate 325 (65 Fe) MG Oral Tablet; TAKE 1 TABLET DAILY WITH FOOD; Therapy: 20NHK7297 to (Jazmine Solis)  Requested for: 46EXF1182; Last   Rx:07Mar2017 Ordered   6  Folic Acid 1 MG Oral Tablet; Take 1 tablet daily; Therapy: 41DCU2723 to (Franki Vincent)  Requested for: 41GXP1929; Last   Rx:01Wrt9761 Ordered   7  Prenatal Multivitamin + DHA 28-0 8 & 200 MG Oral Miscellaneous; TAKE DAILY AS   DIRECTED; Therapy: 44QSW2361 to (Wilfred Ortiz)  Requested for: 44TEB6133; Last   Rx:07Mar2017 Ordered   8  Prenatal Vitamins TABS; Therapy: (Recorded:90Hpv7212) to Recorded    Allergies    1  Penicillins    2  Bee sting   3  No Known Environmental Allergies   4   Other    Signatures   Electronically signed by : Josesito Hall, ; Mar 15 2017  4:32PM EST                       (Author)

## 2018-01-15 NOTE — MISCELLANEOUS
Message  left voice mail message at the number listed on communication consent and asked patient to please call our office as soon as she can regarding Lovenox  Active Problems    1  Amenorrhea (626 0) (N91 2)   2  Anxiety (300 00) (F41 9)   3  Bipolar disorder (296 80) (F31 9)   4  Bipolar disorder (296 80) (F31 9)   5  Birth Control   6  Dichorionic diamniotic twin pregnancy in first trimester (651 03,V91 03) (O30 041)   7  Earache (388 70) (H92 09)   8  Encounter for routine gynecological examination (V72 31) (Z01 419)   9  Encounter to determine fetal viability of pregnancy, fetus 1 (V23 87) (O36 80X1)   10  Encounter to determine fetal viability of pregnancy, fetus 2 (V23 87) (O36 80X2)   11  Hemiparesis (342 90) (G81 90)   12  Hemiparesis, right (342 90) (G81 91)   13  History of allergy (V15 09) (Z88 9)   14  History of venous thromboembolism (V12 51) (Z86 718)   15  Late prenatal care affecting pregnancy (V23 7) (O09 30)   16  Obesity, unspecified obesity severity, unspecified obesity type (278 00) (E66 9)   17  Pregnancy, obstetrical care (V22 1) (Z34 90)   18  Prenatal care, subsequent pregnancy in first trimester (V22 1) (Z34 81)   19  Scabies (133 0) (B86)   20  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 (539 09,773 69) (P91 0,I63 9)   21  Twin pregnancy in second trimester (651 03) (O30 002)   22  Twin pregnancy, twins dichorionic and diamniotic (651 00,V91 03) (O30 049)   21  Urinary tract infection (599 0) (N39 0)   24  Vaginal bleeding in pregnancy, first trimester (640 93) (O20 9)    Current Meds   1  Aspirin 81 MG Oral Tablet Chewable; CHEW AND SWALLOW 1 TABLET DAILY; Therapy: 39QPU3940 to (Evaluate:06Apr2017)  Requested for: 18ULE9991; Last   Rx:07Mar2017 Ordered   2  Aspirin Adult Low Strength 81 MG Oral Tablet Chewable; USE AS DIRECTED; Therapy: 04ZYC2682 to Recorded   3  Enoxaparin Sodium 40 MG/0 4ML Subcutaneous Solution; INJECT 40 MG Twice daily   subcutaneous administration;    Therapy: 85QVM8183 to (Evaluate:05Jun2017)  Requested for: 84JFM0634; Last   Rx:07Mar2017 Ordered   4  EpiPen 2-[place]renton 0 3 MG/0 3ML JORDON; INJECT INTO THE UPPER OUTER THIGH AS   NEEDED FOR A SEVERE ALLERGIC REACTION; Therapy: 15Apr2014 to (Last Rx:15Apr2014)  Requested for: 15Apr2014 Ordered   5  Ferrous Sulfate 325 (65 Fe) MG Oral Tablet; TAKE 1 TABLET DAILY WITH FOOD; Therapy: 53RQM7674 to (Shiv Sargent)  Requested for: 22GUE5531; Last   Rx:07Mar2017 Ordered   6  Folic Acid 1 MG Oral Tablet; Take 1 tablet daily; Therapy: 36AMM2294 to (Rena Maria)  Requested for: 20VJE0300; Last   Rx:07Feb2017 Ordered   7  Prenatal Multivitamin + DHA 28-0 8 & 200 MG Oral Miscellaneous; TAKE DAILY AS   DIRECTED; Therapy: 16SFE8071 to (Tish Hurley)  Requested for: 06IPE4127; Last   Rx:07Mar2017 Ordered   8  Prenatal Vitamins TABS; Therapy: (Recorded:77Euf7462) to Recorded    Allergies    1  Penicillins    2  Bee sting   3  No Known Environmental Allergies   4   Other    Signatures   Electronically signed by : Santiago Marshall, ; Mar 13 2017 12:13PM EST                       (Author)

## 2018-01-17 NOTE — MISCELLANEOUS
Message  lab slip mailed to pt  for cbc platelets  Active Problems    1  Amenorrhea (626 0) (N91 2)   2  Anxiety (300 00) (F41 9)   3  Bipolar disorder (296 80) (F31 9)   4  Bipolar disorder (296 80) (F31 9)   5  Birth Control   6  Dichorionic diamniotic twin pregnancy in first trimester (651 03,V91 03) (O30 041)   7  Earache (388 70) (H92 09)   8  Encounter for routine gynecological examination (V72 31) (Z01 419)   9  Encounter to determine fetal viability of pregnancy, fetus 1 (V23 87) (O36 80X1)   10  Encounter to determine fetal viability of pregnancy, fetus 2 (V23 87) (O36 80X2)   11  Hemiparesis (342 90) (G81 90)   12  Hemiparesis, right (342 90) (G81 91)   13  History of allergy (V15 09) (Z88 9)   14  History of venous thromboembolism (V12 51) (Z86 718)   15  Late prenatal care affecting pregnancy (V23 7) (O09 30)   16  Obesity, unspecified obesity severity, unspecified obesity type (278 00) (E66 9)   17  Pregnancy, obstetrical care (V22 1) (Z34 90)   18  Prenatal care, subsequent pregnancy in first trimester (V22 1) (Z34 81)   19  Scabies (133 0) (B86)   20  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 (288 43,609 91) (P91 0,I63 9)   21  Twin pregnancy in second trimester (651 03) (O30 002)   22  Twin pregnancy, twins dichorionic and diamniotic (651 00,V91 03) (O30 049)   21  Urinary tract infection (599 0) (N39 0)   24  Vaginal bleeding in pregnancy, first trimester (640 93) (O20 9)    Current Meds   1  Aspirin 81 MG Oral Tablet Chewable; CHEW AND SWALLOW 1 TABLET DAILY; Therapy: 92MBQ6761 to (Evaluate:06Apr2017)  Requested for: 31XBK7274; Last   Rx:07Mar2017 Ordered   2  Aspirin Adult Low Strength 81 MG Oral Tablet Chewable; USE AS DIRECTED; Therapy: 24JYQ8495 to Recorded   3  Enoxaparin Sodium 40 MG/0 4ML Subcutaneous Solution; INJECT 40 MG Twice daily   subcutaneous administration; Therapy: 57KIE1238 to (Evaluate:05Jun2017)  Requested for: 92KHL7569; Last   Rx:07Mar2017 Ordered   4   EpiPen 2-Trenton 0 3 MG/0 3ML JORDON; INJECT INTO THE UPPER OUTER THIGH AS   NEEDED FOR A SEVERE ALLERGIC REACTION; Therapy: 97Bag6333 to (Last Rx:15Apr2014)  Requested for: 15Apr2014 Ordered   5  Ferrous Sulfate 325 (65 Fe) MG Oral Tablet; TAKE 1 TABLET DAILY WITH FOOD; Therapy: 22OQX3120 to (Yessi Costa)  Requested for: 20VNF7525; Last   Rx:07Mar2017 Ordered   6  Folic Acid 1 MG Oral Tablet; Take 1 tablet daily; Therapy: 65MXN6600 to (Mandy Breeding)  Requested for: 18PMT7886; Last   Rx:07Feb2017 Ordered   7  Prenatal Multivitamin + DHA 28-0 8 & 200 MG Oral Miscellaneous; TAKE DAILY AS   DIRECTED; Therapy: 70MWQ9335 to (Kimberly Villegas)  Requested for: 26LRZ4836; Last   Rx:07Mar2017 Ordered   8  Prenatal Vitamins TABS; Therapy: (Recorded:41Qpe6636) to Recorded    Allergies    1  Penicillins    2  Bee sting   3  No Known Environmental Allergies   4   Other    Signatures   Electronically signed by : Miki Avina RN; Mar 22 2017  1:11PM EST                       (Author)

## 2018-01-17 NOTE — PROGRESS NOTES
2017         RE: Roa Gosselin                                To: [facility]  MR#: [id number]                                   [address]   : [place] [address]   ENC: [id number] Malcolm, [address]   (Exam #: [id number])                           Fax: [phone]      The LMP of this 22year old,  G3, P1-1-0-2 patient was OCT 28 2016, her   working AIDAN is AUG 32 2017 and the current gestational age is 35 weeks 5   days by 1st Trimester Sono  A sonographic examination was performed on 2017 using real time equipment  The ultrasound examination was   performed using abdominal technique  The patient has a BMI of 51 6  Her   initial blood pressure today was 153/90, and it was later recorded at   110/70        Earliest ultrasound found in her record: 2017  10w5d  17 AIDAN      Fetus A   Cardiac motion was observed at 148 bpm       Fetus B   Cardiac motion was observed at 151 bpm       INDICATIONS      multiple gestation - twins   morbid obesity   fetal growth      Exam Types      Level I      RESULTS      Fetus # 1 of 2   Vertex presentation   Probable symmetric IUGR   Fetal position = Inferior, Maternal Left   Placenta Location = Anterior   No placenta previa   Placenta Grade = II   Chorionicity = Dichorionic, Diamniotic      Fetus # 2 of 2   Vertex presentation   Fetal growth appeared normal   Fetal position = Superior, Maternal Right   Placenta Location = Posterior   No placenta previa   Placenta Grade = II   Chorionicity = Dichorionic, Diamniotic      MEASUREMENTS (* Included In Average GA)      FETUS A   AC              19 5 cm        24 weeks 0 days* (<5%)   BPD              6 5 cm        26 weeks 2 days* (<5%)   HC              25 1 cm        27 weeks 0 days* (<5%)   Femur            5 0 cm        26 weeks 6 days* (8%)      Cerebellum       2 8 cm        26 weeks 1 day      HC/AC 1 29   FL/AC           0 26   FL/BPD          0 77   EFW (Ac/Fl/Hc)   828 grams - 1 lbs 13 oz                 (<5%)      Fetus A AVERAGE G  A  is 26 weeks 1 day +/- 14 days  FETUS B   AC              23 1 cm        27 weeks 2 days* (22%)   BPD              6 9 cm        27 weeks 6 days* (27%)   HC              26 0 cm        28 weeks 0 days* (22%)   Femur            5 2 cm        27 weeks 6 days* (25%)      HC/AC           1 13   FL/AC           0 23   FL/BPD          0 75   EFW (Ac/Fl/Hc)  1111 grams - 2 lbs 7 oz                 (28%)      Fetus B AVERAGE G  A  is 27 weeks 6 days +/- 14 days  AMNIOTIC FLUID      Fetus A      Largest Vertical Pocket = 4 6 cm   Amniotic Fluid: Normal         Fetus B      Largest Vertical Pocket = 5 8 cm   Amniotic Fluid: Normal      FETAL VESSELS      Fetus A                                  S/D   PI    RI    PSV   AEDV RF                                                    cm/s       Umbilical Artery                 2 34  1 00        Yes  No       Middle Cerebral Artery     3 23  1 30  0 69        No      ANATOMY DETAILS      Fetus A   Visualized Appearing Sonographically Normal:   HEAD: (Calvarium, BPD Level, Cavum, Lateral Ventricles, Choroid Plexus,   Cerebellum, Cisterna Magna);    TH  CAV : (Diaphragm); HEART: (Proximal   Left Outflow, Proximal Right Outflow, Short Axis of Greater Vessels,   Interventricular Septum, Interatrial Septum);    STOMACH, RIGHT KIDNEY,   LEFT KIDNEY, BLADDER, PLACENTA      Not Visualized:   HEART: (Four Chamber View, 3VV, 3 Vessel Trachea)      Fetus B   Visualized Appearing Sonographically Normal:   HEAD: (Calvarium, BPD Level);    TH  CAV : (Diaphragm);     HEART:   (Interventricular Septum, Interatrial Septum);    STOMACH, RIGHT KIDNEY,   LEFT KIDNEY, BLADDER, PLACENTA      Not Visualized:   HEART: (Four Chamber View, Proximal Left Outflow, Proximal Right Outflow,   3VV, 3 Vessel Trachea, Short Axis of Greater Vessels) BIOPHYSICAL PROFILE      Fetus A   The Biophysical Profile score was 8/8  Breathin  Movement: 2  Tone: 2  AFV: 2      IMPRESSION      Twin IUP (Fetus A)   26 weeks and 1 day by this ultrasound  (AIDAN=SEP 18 2017)   28 weeks and 5 days by 1st Tri Sono  (AIDAN=AUG 31 2017)   Vertex presentation   Growth assessment indicated probable symmetric IUGR   Regular fetal heart rate of 148 bpm   Anterior placenta   No placenta previa   Fetal position = Inferior, Left   Dichorionic, diamniotic      Twin IUP (Fetus B)   27 weeks and 6 days by this ultrasound  (AIDAN=SEP 6 2017)   28 weeks and 5 days by 1st Tri Sono  (AIDAN=AUG 31 2017)   Vertex presentation   Fetal growth appeared normal   Regular fetal heart rate of 151 bpm   Posterior placenta   No placenta previa   Fetal position = Superior, Right   Dichorionic, diamniotic      GENERAL COMMENT      Today's ultrasound findings are concerning for significant fetal growth   restriction on twin a  This twin is measuring less than the 5th   percentile  The growth around twin B is overall reassuring at the 28th   percentile  The Dopplers for twin A demonstrate intermittent absent   end-diastolic velocity with evidence of cephalization with a lower   pulsatility index on the middle cerebral artery compared to the umbilical   artery  Unfortunately, a ductus venosus waveform could not be   appreciated  The overall biophysical profile was overall reassuring  The   amniotic fluid is normal around this twin  I had a long discussion with the patient and the father the baby as well   as the patient's mother  I am concerned that twin a appears to have   evidence of likely uteroplacental insufficiency  We discussed the complex   nature of a twin pregnancy, especially dichorionic twins, who have   different placental architecture with regard to timing of delivery when   one twin appears significantly growth restricted    That timing is   primarily based upon reducing significant mortality and morbidity for the   compromise twin and balancing the risks of prematurity for the   noncompromised twin  Given the concerns related to Doppler findings   today, she was administered  corticosteroids, her first dose of   betamethasone 12 mg today by my nurse  She will return tomorrow to the   [facility] office tomorrow to complete the course of  steroids  I recommend initiation of twice weekly  surveillance which should   include twice-weekly biophysical profiles on twin A given the compromised   nature of this twin and the patient significant morbid obesity which may   make testing difficult  We should attempt a ductus venosus waveform on   Friday  Thank you very much for allowing us to participate in the care of this   very nice patient  Should you have any questions, please do not hesitate   to contact our office  Please note, in addition to the time spent discussing the results of the   ultrasound, I spent approximately 15 minutes of face-to-face time with the   patient, greater than 50% of which was spent in counseling and the   coordination of care for this patient  Portions of the record may have been created with voice recognition   software  Occasional wrong word or "sound a like" substitutions may have   occurred due to the inherent limitations of voice recognition software  Read the chart carefully and recognize, using context, where substitutions   have occurred  KIAH Arreaga M D     Electronically signed 17 11:52

## 2018-01-18 NOTE — MISCELLANEOUS
Message   Recorded as Task   Date: 02/02/2017 02:03 PM, Created By: Carisa Quiros   Task Name: Care Coordination   Assigned To: THIERRY FALCON,Team   Regarding Patient: Dia Soliman, Status: In Progress   Comment:    Lou Lama - 02 Feb 2017 2:03 PM     TASK CREATED  Caller: Self; [phone] (Mobile Phone)  [facility] please advise pt @ [phone]   [facility] - 02 Feb 2017 2:08 PM     TASK IN PROGRESS   OneydaCailin - 02 Feb 2017 2:37 PM     TASK REPLIED TO: Previously Assigned To THIERRY FALCON,Team  spoke w/ pt ob apt made for e/melba ofc          Signatures   Electronically signed by : Ashley Nieves, ; Feb 2 2017  2:37PM EST                       (Author)

## 2018-01-22 ENCOUNTER — OB ABSTRACT (OUTPATIENT)
Dept: OBGYN CLINIC | Facility: CLINIC | Age: 23
End: 2018-01-22

## 2018-01-22 VITALS — SYSTOLIC BLOOD PRESSURE: 150 MMHG | BODY MASS INDEX: 51.51 KG/M2 | HEIGHT: 61 IN | DIASTOLIC BLOOD PRESSURE: 75 MMHG

## 2018-01-22 VITALS
WEIGHT: 242 LBS | BODY MASS INDEX: 45.69 KG/M2 | HEIGHT: 61 IN | SYSTOLIC BLOOD PRESSURE: 126 MMHG | DIASTOLIC BLOOD PRESSURE: 70 MMHG

## 2018-01-22 VITALS
HEIGHT: 61 IN | DIASTOLIC BLOOD PRESSURE: 88 MMHG | SYSTOLIC BLOOD PRESSURE: 144 MMHG | WEIGHT: 253 LBS | BODY MASS INDEX: 47.77 KG/M2

## 2018-01-22 VITALS
BODY MASS INDEX: 42.1 KG/M2 | SYSTOLIC BLOOD PRESSURE: 130 MMHG | DIASTOLIC BLOOD PRESSURE: 82 MMHG | WEIGHT: 223 LBS | HEIGHT: 61 IN

## 2018-01-22 VITALS — DIASTOLIC BLOOD PRESSURE: 84 MMHG | SYSTOLIC BLOOD PRESSURE: 128 MMHG

## 2018-01-22 VITALS
HEIGHT: 61 IN | DIASTOLIC BLOOD PRESSURE: 78 MMHG | WEIGHT: 235 LBS | SYSTOLIC BLOOD PRESSURE: 122 MMHG | BODY MASS INDEX: 44.37 KG/M2

## 2018-01-23 VITALS
WEIGHT: 254 LBS | SYSTOLIC BLOOD PRESSURE: 138 MMHG | DIASTOLIC BLOOD PRESSURE: 86 MMHG | BODY MASS INDEX: 47.95 KG/M2 | HEIGHT: 61 IN

## 2018-01-23 NOTE — MISCELLANEOUS
Message   Recorded as Task   Date: 12/12/2017 11:05 AM, Created By: Jane Nina   Task Name: Med Renewal Request   Assigned To: Irene Brunson   Regarding Patient: Spring Julian, Status: In Progress   Jose Duarte - 12 Dec 2017 11:05 AM     TASK CREATED  Caller: Self; Renew Medication; [phone] (Home); [phone]  pt's  had call stated his wife needs refill on blood preasure medication didn't know the name of it stated Dr Jason Goddard prescribed  it pt's phone number [phone] or [phone] drug store on file  Kimberlyn Nealjjar - 12 Dec 2017 12:41 PM     TASK EDITED  I am not sure if you or her pcp is to be rx the labetalol? please advise   Alverto Novak - 12 Dec 2017 1:11 PM     TASK REPLIED TO: Previously Assigned To THIERRY OB,Team  we will be taking over her meds during her pregnancy  but is she taking both labetalol and nifedipine? that is what is listed in her chart- or just one?    it is ok to be on just one but we need to know which to refill it -   Ada Rehman - 12 Dec 2017 1:16 PM     TASK IN PROGRESS   Ada Rehman - 12 Dec 2017 3:39 Darlene Mendiola EDITED  Dear Dr Jason Goddard:    Spoke with Pt via phone call today  Pt states that somebody stole her blood pressure medication  Pt states that she takes both Labetatol and Nifedipine, medication as previously prescribed by Dr Marcelle Hoang  Please advise  Thank you and have a joyful holiday! Idamae Lorimor, [place]   La Harper - 12 Dec 2017 5:08 PM     TASK REPLIED TO: Previously Assigned To Alverto Novak                      please put them in then I can authorize them  with the pharmacy, should it be one month or three   Nicolle Mitchell - 12 Dec 2017 5:42 PM     TASK EDITED  rx to her for ktm to sign   Nicolle Mitchell - 12 Dec 2017 5:42 PM     TASK IN PROGRESS        Active Problems    1  Acute pain (338 19) (R52)   2  Bipolar disorder (296 80) (F31 9)   3   Contraceptive use education (V25 09) (Z30 09)   4  Encounter for postpartum visit (V24 2) (Z39 2)   5  Hemiparesis, right (342 90) (G81 91)   6  History of venous thromboembolism (V12 51) (Z86 718)   7  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 (665 22,558 06) (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 0,I63 9)   8  Visit for routine gyn exam (V72 31) (Z01 419)    Current Meds   1  Aspirin Adult Low Strength 81 MG Oral Tablet Chewable; USE AS DIRECTED; Therapy: 73CJZ0103 to Recorded   2  Aviane 0 1-20 MG-MCG Oral Tablet; Take 1 tablet daily; Therapy: 62Jlz4293 to (Last Rx:79Jzn9888)  Requested for: 36Hzn3777 Ordered   3  Betamethasone Sod Phos & Acet 6 (3-3) MG/ML Injection Suspension; 12mg im repeat in   24 hours; Therapy: 03ADX1938 to (Last Rx:13Jun2017) Ordered   4  Enoxaparin Sodium 40 MG/0 4ML Subcutaneous Solution (Lovenox); 40mg SQ Twice   Daily for 6 weeks post partum; Therapy: 00SSD3170 to (Evaluate:10Aug2017)  Requested for: 16UVI8506; Last   KE:90XUL3404 Ordered   5  Enoxaparin Sodium 40 MG/0 4ML Subcutaneous Solution; INJECT 40 MG Twice daily   subcutaneous administration; Therapy: 24BLJ5486 to (Evaluate:05Jun2017)  Requested for: 93BHZ8807; Last   Rx:07Mar2017 Ordered   6  EpiPen 2-[place]renton 0 3 MG/0 3ML JORDON; INJECT INTO THE UPPER OUTER THIGH AS   NEEDED FOR A SEVERE ALLERGIC REACTION; Therapy: 71Hsu0139 to (Last Rx:15Apr2014)  Requested for: 48Zfz7677 Ordered   7  Ferrous Sulfate 325 (65 Fe) MG Oral Tablet; TAKE 1 TABLET DAILY WITH FOOD; Therapy: 72RXJ8336 to (Meryle Silvan)  Requested for: 41CIX8005; Last   Rx:07Mar2017 Ordered   8  Folic Acid 1 MG Oral Tablet; Take 1 tablet daily; Therapy: 07PKL9991 to (Tyler Carter)  Requested for: 76RXS9761; Last   Rx:23Omm1317 Ordered   9  Ibuprofen 600 MG Oral Tablet; TAKE 1 TABLET EVERY 6 HOURS WITH MEALS; Therapy: 18NBR5702 to (Evaluate:49Fai8096)  Requested for: 61HYY1970; Last   Rx:28Jun2017 Ordered   10  Prenatal Multivitamin + DHA 28-0 8 & 200 MG Oral Miscellaneous; TAKE DAILY AS    DIRECTED;     Therapy: 56PMW9714 to (Dion Anaya)  Requested for: 83XOG2068; Last    Rx:07Mar2017 Ordered    Allergies    1  Penicillins    2  Bee sting   3  No Known Environmental Allergies   4   Other    Signatures   Electronically signed by : Dakota Shea, ; Dec 12 2017  5:42PM EST                       (Author)

## 2018-01-24 ENCOUNTER — ROUTINE PRENATAL (OUTPATIENT)
Dept: OBGYN CLINIC | Facility: CLINIC | Age: 23
End: 2018-01-24
Payer: COMMERCIAL

## 2018-01-24 VITALS
DIASTOLIC BLOOD PRESSURE: 82 MMHG | WEIGHT: 254 LBS | SYSTOLIC BLOOD PRESSURE: 138 MMHG | BODY MASS INDEX: 47.95 KG/M2 | HEIGHT: 61 IN

## 2018-01-24 DIAGNOSIS — Z11.3 SCREENING FOR STDS (SEXUALLY TRANSMITTED DISEASES): Primary | ICD-10-CM

## 2018-01-24 PROCEDURE — 87491 CHLMYD TRACH DNA AMP PROBE: CPT | Performed by: NURSE PRACTITIONER

## 2018-01-24 PROCEDURE — 87591 N.GONORRHOEAE DNA AMP PROB: CPT | Performed by: NURSE PRACTITIONER

## 2018-01-24 PROCEDURE — 99213 OFFICE O/P EST LOW 20 MIN: CPT | Performed by: NURSE PRACTITIONER

## 2018-01-24 RX ORDER — NIFEDIPINE 30 MG/1
1 TABLET, EXTENDED RELEASE ORAL DAILY
COMMUNITY
Start: 2017-06-26 | End: 2018-03-13 | Stop reason: SDUPTHER

## 2018-01-24 RX ORDER — EPINEPHRINE 0.3 MG/.3ML
INJECTION SUBCUTANEOUS
COMMUNITY
Start: 2014-04-15 | End: 2020-06-02 | Stop reason: SDUPTHER

## 2018-01-24 RX ORDER — FERROUS SULFATE 325(65) MG
1 TABLET ORAL DAILY
COMMUNITY
Start: 2017-03-07 | End: 2018-06-05 | Stop reason: SDUPTHER

## 2018-01-24 RX ORDER — LABETALOL 200 MG/1
200 TABLET, FILM COATED ORAL 2 TIMES DAILY
Refills: 0 | COMMUNITY
Start: 2018-01-12 | End: 2018-03-13 | Stop reason: ALTCHOICE

## 2018-01-24 NOTE — PROGRESS NOTES
PN 1 visit, here with FOB  Patient feels well  Has not done OB labs yet  Will call Perinatology today for genetic testing  Pap NIL 2017, GC/CT done  Declined flu shot  Oriented to our practice  No concerns

## 2018-01-26 ENCOUNTER — LAB REQUISITION (OUTPATIENT)
Dept: LAB | Facility: HOSPITAL | Age: 23
End: 2018-01-26
Payer: COMMERCIAL

## 2018-01-26 DIAGNOSIS — Z11.3 ENCOUNTER FOR SCREENING FOR INFECTIONS WITH PREDOMINANTLY SEXUAL MODE OF TRANSMISSION: ICD-10-CM

## 2018-01-30 LAB
CHLAMYDIA DNA CVX QL NAA+PROBE: NORMAL
N GONORRHOEA DNA GENITAL QL NAA+PROBE: NORMAL

## 2018-02-16 ENCOUNTER — APPOINTMENT (OUTPATIENT)
Dept: LAB | Facility: CLINIC | Age: 23
End: 2018-02-16
Payer: COMMERCIAL

## 2018-02-16 ENCOUNTER — LAB REQUISITION (OUTPATIENT)
Dept: LAB | Facility: HOSPITAL | Age: 23
End: 2018-02-16
Payer: COMMERCIAL

## 2018-02-16 DIAGNOSIS — Z34.90 ENCOUNTER FOR SUPERVISION OF NORMAL PREGNANCY: ICD-10-CM

## 2018-02-16 DIAGNOSIS — O14.13 SEVERE PRE-ECLAMPSIA IN THIRD TRIMESTER: ICD-10-CM

## 2018-02-16 DIAGNOSIS — Z87.59 PERSONAL HISTORY OF OTHER COMPLICATIONS OF PREGNANCY, CHILDBIRTH AND THE PUERPERIUM: ICD-10-CM

## 2018-02-16 LAB
ABO GROUP BLD: NORMAL
ALBUMIN SERPL BCP-MCNC: 2.7 G/DL (ref 3.5–5)
ALP SERPL-CCNC: 87 U/L (ref 46–116)
ALT SERPL W P-5'-P-CCNC: 10 U/L (ref 12–78)
ANION GAP SERPL CALCULATED.3IONS-SCNC: 10 MMOL/L (ref 4–13)
AST SERPL W P-5'-P-CCNC: 8 U/L (ref 5–45)
BACTERIA UR QL AUTO: ABNORMAL /HPF
BASOPHILS # BLD AUTO: 0.03 THOUSANDS/ΜL (ref 0–0.1)
BASOPHILS NFR BLD AUTO: 0 % (ref 0–1)
BILIRUB SERPL-MCNC: 0.79 MG/DL (ref 0.2–1)
BILIRUB UR QL STRIP: NEGATIVE
BLD GP AB SCN SERPL QL: NEGATIVE
BUN SERPL-MCNC: 7 MG/DL (ref 5–25)
CALCIUM SERPL-MCNC: 8.6 MG/DL (ref 8.3–10.1)
CHLORIDE SERPL-SCNC: 104 MMOL/L (ref 100–108)
CLARITY UR: CLEAR
CO2 SERPL-SCNC: 22 MMOL/L (ref 21–32)
COLOR UR: YELLOW
CREAT 24H UR-MRATE: 0.4 G/24HR (ref 0.6–1.8)
CREAT SERPL-MCNC: 0.29 MG/DL (ref 0.6–1.3)
CREAT UR-MCNC: 82.2 MG/DL
EOSINOPHIL # BLD AUTO: 0.13 THOUSAND/ΜL (ref 0–0.61)
EOSINOPHIL NFR BLD AUTO: 1 % (ref 0–6)
ERYTHROCYTE [DISTWIDTH] IN BLOOD BY AUTOMATED COUNT: 16.3 % (ref 11.6–15.1)
GFR SERPL CREATININE-BSD FRML MDRD: 166 ML/MIN/1.73SQ M
GLUCOSE P FAST SERPL-MCNC: 70 MG/DL (ref 65–99)
GLUCOSE UR STRIP-MCNC: NEGATIVE MG/DL
HCT VFR BLD AUTO: 35 % (ref 34.8–46.1)
HGB BLD-MCNC: 11.5 G/DL (ref 11.5–15.4)
HGB UR QL STRIP.AUTO: NEGATIVE
KETONES UR STRIP-MCNC: ABNORMAL MG/DL
LEUKOCYTE ESTERASE UR QL STRIP: ABNORMAL
LYMPHOCYTES # BLD AUTO: 1.98 THOUSANDS/ΜL (ref 0.6–4.47)
LYMPHOCYTES NFR BLD AUTO: 18 % (ref 14–44)
MCH RBC QN AUTO: 25 PG (ref 26.8–34.3)
MCHC RBC AUTO-ENTMCNC: 32.9 G/DL (ref 31.4–37.4)
MCV RBC AUTO: 76 FL (ref 82–98)
MONOCYTES # BLD AUTO: 0.52 THOUSAND/ΜL (ref 0.17–1.22)
MONOCYTES NFR BLD AUTO: 5 % (ref 4–12)
NEUTROPHILS # BLD AUTO: 8.39 THOUSANDS/ΜL (ref 1.85–7.62)
NEUTS SEG NFR BLD AUTO: 76 % (ref 43–75)
NITRITE UR QL STRIP: NEGATIVE
NON-SQ EPI CELLS URNS QL MICRO: ABNORMAL /HPF
NRBC BLD AUTO-RTO: 0 /100 WBCS
PH UR STRIP.AUTO: 6.5 [PH] (ref 4.5–8)
PLATELET # BLD AUTO: 365 THOUSANDS/UL (ref 149–390)
PMV BLD AUTO: 8.8 FL (ref 8.9–12.7)
POTASSIUM SERPL-SCNC: 3.6 MMOL/L (ref 3.5–5.3)
PROT 24H UR-MCNC: 27.5 MG/24 HRS (ref 40–150)
PROT SERPL-MCNC: 7.3 G/DL (ref 6.4–8.2)
PROT UR STRIP-MCNC: NEGATIVE MG/DL
PROT UR-MCNC: <6 MG/DL
PROT/CREAT UR: <0.07 MG/G{CREAT} (ref 0–0.1)
RBC # BLD AUTO: 4.6 MILLION/UL (ref 3.81–5.12)
RBC #/AREA URNS AUTO: ABNORMAL /HPF
RH BLD: POSITIVE
RUBV IGG SERPL IA-ACNC: 16.4 IU/ML
SODIUM SERPL-SCNC: 136 MMOL/L (ref 136–145)
SP GR UR STRIP.AUTO: 1.02 (ref 1–1.03)
SPECIMEN EXPIRATION DATE: NORMAL
SPECIMEN VOL UR: 250 ML
SPECIMEN VOL UR: 250 ML
URATE SERPL-MCNC: 3.9 MG/DL (ref 2–6.8)
UROBILINOGEN UR QL STRIP.AUTO: 0.2 E.U./DL
WBC # BLD AUTO: 11.09 THOUSAND/UL (ref 4.31–10.16)
WBC #/AREA URNS AUTO: ABNORMAL /HPF

## 2018-02-16 PROCEDURE — 80081 OBSTETRIC PANEL INC HIV TSTG: CPT

## 2018-02-16 PROCEDURE — 80053 COMPREHEN METABOLIC PANEL: CPT

## 2018-02-16 PROCEDURE — 81001 URINALYSIS AUTO W/SCOPE: CPT

## 2018-02-16 PROCEDURE — 84550 ASSAY OF BLOOD/URIC ACID: CPT

## 2018-02-16 PROCEDURE — 87086 URINE CULTURE/COLONY COUNT: CPT

## 2018-02-16 PROCEDURE — 82570 ASSAY OF URINE CREATININE: CPT

## 2018-02-16 PROCEDURE — 84156 ASSAY OF PROTEIN URINE: CPT

## 2018-02-16 PROCEDURE — 36415 COLL VENOUS BLD VENIPUNCTURE: CPT

## 2018-02-17 LAB
BACTERIA UR CULT: NORMAL
HBV SURFACE AG SER QL: NORMAL

## 2018-02-18 LAB
HIV 1+2 AB+HIV1 P24 AG SERPL QL IA: NORMAL
RPR SER QL: NORMAL

## 2018-02-26 NOTE — MISCELLANEOUS
Message   Recorded as Task   Date: 01/12/2018 11:26 AM, Created By: Bailey Corrigan   Task Name: Care Coordination   Assigned To: Lois Azevedo   Regarding Patient: Jim Dyer, Status: In Progress   Comment:    Bailey Corrigan - 12 Jan 2018 11:26 AM     TASK CREATED  Patient requesting refill of Labetalol and Nifedipine, also would like rx sent in for prenatal vitamins-thanks! Keelykelly Correa - 12 Jan 2018 1:02 PM     TASK EDITED   Johana Correa - 12 Jan 2018 1:03 PM     TASK EDITED  Patient should have refills, I left a message to return my call  Johana Correa - 12 Jan 2018 3:19 PM     TASK EDITED  Benny Lion (fiancÃÂ©) called, I told him she has 1 refill on both of her medications  They need to call the pharmacy and tell them to fill  Active Problems    1  Acute pain (338 19) (R52)   2  Amenorrhea (626 0) (N91 2)   3  Bipolar disorder (296 80) (F31 9)   4  Contraceptive use education (V25 09) (Z30 09)   5  Encounter for postpartum visit (V24 2) (Z39 2)   6  Hemiparesis, right (342 90) (G81 91)   7  History of venous thromboembolism (V12 51) (Z86 718)   8  Pregnancy, obstetrical care (V22 1) (Z34 90)   9  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 (313 87,172 89) (P91 0,I63 9)   10  Visit for routine gyn exam (V72 31) (Z01 419)    Current Meds   1  Aspirin Low Dose 81 MG TABS; Therapy: (Recorded:12Jan2018) to Recorded   2  Labetalol HCl - 200 MG Oral Tablet; TAKE 1 TABLET TWICE DAILY; Therapy: 89TSK5162 to (Evaluate:10Jun2018)  Requested for: 00Gpc2616; Last   Rx:74Fhf8351 Ordered   3  NIFEdipine ER Osmotic Release 30 MG Oral Tablet Extended Release 24 Hour   (Procardia XL); TAKE 1 TABLET DAILY; Therapy: 83KKW3655 to (Evaluate:42Yfe1276)  Requested for: 94Twy5159; Last   Rx:49Reo3722 Ordered   4  Prenatal Vitamin TABS; Therapy: (Recorded:12Jan2018) to Recorded    Allergies    1  Penicillins    2  Bee sting   3  Eggs   4   Other    Signatures   Electronically signed by : Jacek Oreilly, ; Jan 12 2018  3:19PM EST (Author)

## 2018-02-28 ENCOUNTER — ROUTINE PRENATAL (OUTPATIENT)
Dept: OBGYN CLINIC | Facility: MEDICAL CENTER | Age: 23
End: 2018-02-28

## 2018-02-28 VITALS
HEIGHT: 61 IN | WEIGHT: 264.2 LBS | BODY MASS INDEX: 49.88 KG/M2 | SYSTOLIC BLOOD PRESSURE: 126 MMHG | DIASTOLIC BLOOD PRESSURE: 70 MMHG

## 2018-02-28 DIAGNOSIS — Z3A.18 18 WEEKS GESTATION OF PREGNANCY: Primary | ICD-10-CM

## 2018-02-28 PROCEDURE — PNV: Performed by: OBSTETRICS & GYNECOLOGY

## 2018-02-28 NOTE — PROGRESS NOTES
Patient here today complaining feeling lightheaded and short of breath after walking for 20 minutes, patient denies chest pain, or palpitations  On physical exam patient is morbidly obese with Pulse 98, lungs are clear to auscultation, patient does not appear to be in respiratory distress  We will make a referral to cardiology for evaluation  Patient denies vaginal bleeding or abdominal pain  Patient has multiple issues in this pregnancy  She has had 3 prior  deliveries, and this is a short interval pregnancy  She has a history of chronic hypertension with superimposed preeclampsia,  And Hx DVT  Patient was encouraged to keep her appointment with perinatology and complete her blood work

## 2018-03-01 ENCOUNTER — TELEPHONE (OUTPATIENT)
Dept: CARDIOLOGY CLINIC | Facility: CLINIC | Age: 23
End: 2018-03-01

## 2018-03-01 NOTE — TELEPHONE ENCOUNTER
Patient was referred by Dr Cory Caruso for Z3A 18 (ICD-10-CM) - 18 weeks gestation of pregnancy I offered patient appt in April please advise if that's ok

## 2018-03-02 NOTE — TELEPHONE ENCOUNTER
If she is having any active symptoms, she should be seen with an appointment with one of the cardiologists earliest available

## 2018-03-07 NOTE — PROGRESS NOTES
Education  Baby & Me Education 1st Trimester:   First Trimester Education provided:   benefits of breastfeeding, importance of exclusive breastfeeding, early initiation of breastfeeding, exclusive breastfeeding for the first 6 months and Pregnancy Essentials Reference Guide given   The patient is planning on breastfeeding  Prenatal education provided by: Amber Stover MA      Signatures   Electronically signed by :  Robinson Nguyen, ; Jan 12 2018 10:55AM EST                       (Co-author)

## 2018-03-09 ENCOUNTER — APPOINTMENT (EMERGENCY)
Dept: RADIOLOGY | Facility: HOSPITAL | Age: 23
End: 2018-03-09
Payer: COMMERCIAL

## 2018-03-09 ENCOUNTER — HOSPITAL ENCOUNTER (EMERGENCY)
Facility: HOSPITAL | Age: 23
Discharge: HOME | End: 2018-03-09
Attending: EMERGENCY MEDICINE
Payer: COMMERCIAL

## 2018-03-09 VITALS
OXYGEN SATURATION: 99 % | WEIGHT: 271.39 LBS | HEART RATE: 105 BPM | TEMPERATURE: 98.4 F | BODY MASS INDEX: 51.24 KG/M2 | HEIGHT: 61 IN | SYSTOLIC BLOOD PRESSURE: 140 MMHG | DIASTOLIC BLOOD PRESSURE: 80 MMHG | RESPIRATION RATE: 16 BRPM

## 2018-03-09 DIAGNOSIS — S80.12XA CONTUSION OF LEFT LOWER LEG, INITIAL ENCOUNTER: Primary | ICD-10-CM

## 2018-03-09 DIAGNOSIS — Z34.92 SECOND TRIMESTER PREGNANCY: ICD-10-CM

## 2018-03-09 PROCEDURE — 99283 EMERGENCY DEPT VISIT LOW MDM: CPT

## 2018-03-09 PROCEDURE — 73590 X-RAY EXAM OF LOWER LEG: CPT

## 2018-03-09 RX ORDER — ACETAMINOPHEN 325 MG/1
650 TABLET ORAL ONCE
Status: COMPLETED | OUTPATIENT
Start: 2018-03-09 | End: 2018-03-09

## 2018-03-09 RX ORDER — ACETAMINOPHEN 325 MG/1
650 TABLET ORAL EVERY 6 HOURS PRN
Qty: 30 TABLET | Refills: 0 | Status: SHIPPED | OUTPATIENT
Start: 2018-03-09 | End: 2020-06-02

## 2018-03-09 RX ADMIN — ACETAMINOPHEN 650 MG: 325 TABLET, FILM COATED ORAL at 21:15

## 2018-03-10 NOTE — DISCHARGE INSTRUCTIONS
Contusion in Adults   WHAT YOU NEED TO KNOW:   A contusion is a bruise that appears on your skin after an injury  A bruise happens when small blood vessels tear but skin does not  When blood vessels tear, blood leaks into nearby tissue, such as soft tissue or muscle  DISCHARGE INSTRUCTIONS:   Seek care immediately if:   · You have new trouble moving the injured area  · You have tingling or numbness in or near the injured area  · Your hand or foot below the bruise gets cold or turns pale  Contact your healthcare provider if:   · You find a new lump in the injured area  · Your symptoms do not improve with treatment after 4 to 5 days  · You have questions or concerns about your condition or care  Medicines: You may need any of the following:  · NSAIDs , such as ibuprofen, help decrease swelling, pain, and fever  This medicine is available with or without a doctor's order  NSAIDs can cause stomach bleeding or kidney problems in certain people  If you take blood thinner medicine, always ask your healthcare provider if NSAIDs are safe for you  Always read the medicine label and follow directions  · Prescription pain medicine  may be given  Do not wait until the pain is severe before you take your medicine  · Take your medicine as directed  Contact your healthcare provider if you think your medicine is not helping or if you have side effects  Tell him or her if you are allergic to any medicine  Keep a list of the medicines, vitamins, and herbs you take  Include the amounts, and when and why you take them  Bring the list or the pill bottles to follow-up visits  Carry your medicine list with you in case of an emergency  Follow up with your healthcare provider as directed: You may need to return within a week to check your injury again  Write down your questions so you remember to ask them during your visits    Help a contusion heal:   · Rest the injured area  or use it less than usual  If you bruised your leg or foot, you may need crutches or a cane to help you walk  This will help you keep weight off your injured body part  · Apply ice  to decrease swelling and pain  Ice may also help prevent tissue damage  Use an ice pack, or put crushed ice in a plastic bag  Cover it with a towel and place it on your bruise for 15 to 20 minutes every hour or as directed  · Use compression  to support the area and decrease swelling  Wrap an elastic bandage around the area over the bruised muscle  Make sure the bandage is not too tight  You should be able to fit 1 finger between the bandage and your skin  · Elevate (raise) your injured body part  above the level of your heart to help decrease pain and swelling  Use pillows, blankets, or rolled towels to elevate the area as often as you can  · Do not drink alcohol  as directed  Alcohol may slow healing  · Do not stretch injured muscles  right after your injury  Ask your healthcare provider when and how you may safely stretch after your injury  Gentle stretches can help increase your flexibility  · Do not massage the area or put heating pads  on the bruise right after your injury  Heat and massage may slow healing  Your healthcare provider may tell you to apply heat after several days  At that time, heat will start to help the injury heal   Prevent another contusion:   · Stretch and warm up before you play sports or exercise  · Wear protective gear when you play sports  Examples are shin guards and padding  · If you begin a new physical activity, start slowly to give your body a chance to adjust   © 2017 [address] [employer] Information is for End User's use only and may not be sold, redistributed or otherwise used for commercial purposes  All illustrations and images included in CareNotes® are the copyrighted property of [employer]  or [employer]  The above information is an  only   It is not intended as medical advice for individual conditions or treatments  Talk to your doctor, nurse or pharmacist before following any medical regimen to see if it is safe and effective for you  Trauma During Pregnancy   WHAT YOU NEED TO KNOW:   Trauma during pregnancy may be caused by a fall, assault, or other injury  Tests show that you and your baby have no major injuries  Your baby's heart rate is normal and there are no signs of distress   DISCHARGE INSTRUCTIONS:   Return to the emergency department if:   · You have new or worsening pain or cramping in your abdomen  · You have low back pain  · You have any vaginal bleeding  · You feel a gush or trickle of clear fluid leaking from your vagina  · Your baby is moving less often  Contact your healthcare provider if:   · You have questions or concerns about your condition or care  Follow up with your obstetrician or healthcare provider within 3 days: You may need more tests and close follow-up  Write down your questions so you remember to ask them during your visits  © 2017 [address] [facility] Information is for End User's use only and may not be sold, redistributed or otherwise used for commercial purposes  All illustrations and images included in CareNotes® are the copyrighted property of [employer]  or [address]  The above information is an  only  It is not intended as medical advice for individual conditions or treatments  Talk to your doctor, nurse or pharmacist before following any medical regimen to see if it is safe and effective for you

## 2018-03-10 NOTE — ED PROVIDER NOTES
History  Chief Complaint   Patient presents with    Fall     Pt reports falling down "two or three" steps earlier this afternoon, injuring her left shin  Pt states that she is 19m weeks pregnant, and doesn't "recall" injurying her abdomen at all  Patient is a 21-year-old female, Primus Melvin (twin gestation, normal single gestation and 1 prior miscarriage), currently 19 weeks, 6 days pregnant, presents to the emergency department complaining of left lower leg pain after she slipped and fell down 2-3 steps  Patient states she was walking down steps outside and was very icy which caused her to slip and fall forward  She landed on her knees and banged her left shin against the concrete  She denies hitting her head or loss of consciousness  She denies hitting her chest or abdomen  Her only complaint is left anterior shin pain which is bruised  Denies any associated extremity weakness or paresthesia  Denies any headache, dizziness, change in vision or hearing, tinnitus, neck or back pain, chest pain, abdominal pain or cramping, nausea, vomiting, incontinence, dysuria, frequency, hematuria, vaginal bleeding or leakage of vaginal fluid, skin rash or color change, other focal neurologic deficits  Patient denies any complications with this pregnancy thus far  She feels the baby move and has felt it move since the fall  Medical history includes 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 Taking?    EPINEPHrine (EPIPEN 2-DAGO) 0 3 mg/0 3 mL SOAJ   Yes No   Sig: EpiPen 2-Dago 0 3 MG/0 3ML JORDON  INJECT INTO THE UPPER OUTER THIGH AS NEEDED FOR A SEVERE ALLERGIC REACTION   Quantity: 1;  Refills: [phone] ;  Start 15-Apr-2014  Active  2 EA Pen   NIFEdipine (ADALAT CC) 30 MG 24 hr tablet   No No   Sig: Take 1 tablet by mouth daily   NIFEdipine (PROCARDIA XL) 30 mg 24 hr tablet   Yes No   Sig: Take 1 tablet by mouth daily   Prenatal Multivit-Min-Fe-FA (PRENATAL VITAMINS PO)   Yes No   Sig: Take by mouth   Prenatal Vit-DSS-Fe Fum-FA (PRENATAL 19) tablet   Yes No   Sig: Take 1 tablet by mouth daily   aspirin 81 mg chewable tablet   Yes No   Sig: Chew 81 mg daily   benzocaine-menthol-lanolin-aloe (DERMOPLAST) 20-0 5 % topical spray   No No   Sig: Apply 1 application topically every 6 (six) hours as needed for mild pain   docusate sodium (COLACE) 100 mg capsule   No No   Sig: Take 1 capsule by mouth 2 (two) times a day as needed for constipation   enoxaparin (LOVENOX) 40 mg/0 4 mL   Yes No   Sig: Inject 40 mg under the skin 2 (two) times a day   ferrous sulfate 325 (65 Fe) mg tablet   Yes No   Sig: Take 325 mg by mouth daily   ferrous sulfate 325 (65 Fe) mg tablet   Yes No   Sig: Take 1 tablet by mouth Daily   folic acid (FOLVITE) 1 mg tablet   Yes No   Sig: Take 1 mg by mouth daily   labetalol (NORMODYNE) 200 mg tablet   No No   Sig: Take 1 tablet by mouth every 12 (twelve) hours for 30 days   labetalol (NORMODYNE) 200 mg tablet   Yes No   Sig: Take 200 mg by mouth 2 (two) times a day      Facility-Administered Medications: None       Past Medical History:   Diagnosis Date    Anxiety     takes Zoloft; stopped during pregnancy     Bipolar affective disorder ([facility] [address] ) 6/24/2017    Cardiac asystole (HCC)     Chronic hypertension with superimposed preeclampsia 6/24/2017    Depression     DVT (deep venous thrombosis) ([facility] [address] ) 2016    LLE-post cholecystectomy    Intrauterine growth restriction affecting care of mother, antepartum, third trimester, fetus 1     Migraine     On depo medroxyprogesterone acetate for contraception     Right hemiparesis ([address] )     Seizures ([address] )     in 2016    Severe preeclampsia, third trimester     Varicella     in childhood        Past Surgical History:   Procedure Laterality Date     SECTION      2015 daughter, 2016 son, 2017 son   Alexy Yap N/A 2017    Procedure:  SECTION (); Surgeon: Joanne Torres MD;  Location: [facility];  Service: Obstetrics       Family History   Problem Relation Age of Onset    Pulmonary embolism Mother     Deep vein thrombosis Mother     Depression Mother     Hypertension Mother     Thyroid disease Mother     Diabetes Maternal Grandmother     Lung cancer Maternal Grandmother     Thyroid disease Maternal Grandmother     Lung cancer Other     Deep vein thrombosis Father     Pulmonary embolism Father     Depression Sister     Anxiety disorder Family     Diabetes Family     Hypertension Family     Diabetes type II Family      I have reviewed and agree with the history as documented  Social History   Substance Use Topics    Smoking status: Never Smoker    Smokeless tobacco: Never Used    Alcohol use No        Review of Systems   Constitutional: Negative for chills, fatigue and fever  HENT: Negative for congestion, ear pain, hearing loss, rhinorrhea, sore throat and tinnitus  Eyes: Negative for photophobia, pain and visual disturbance  Respiratory: Negative for cough, chest tightness, shortness of breath and wheezing  Cardiovascular: Negative for chest pain and palpitations  Gastrointestinal: Negative for abdominal distention, abdominal pain, blood in stool, constipation, diarrhea, nausea and vomiting  Genitourinary: Negative for dysuria, flank pain, frequency, hematuria, pelvic pain, vaginal bleeding and vaginal discharge  Musculoskeletal: Negative for back pain and neck pain         + Pain and bruising over left lower leg  Skin: Positive for wound  Negative for color change, pallor and rash  Allergic/Immunologic: Negative for immunocompromised state  Neurological: Negative for dizziness, syncope, weakness, light-headedness, numbness and headaches  Hematological: Negative for adenopathy  Bruises/bleeds easily  Psychiatric/Behavioral: Negative for confusion and decreased concentration  All other systems reviewed and are negative  Physical Exam  ED Triage Vitals [03/09/18 1942]   Temperature Pulse Respirations Blood Pressure SpO2   98 4 °F (36 9 °C) (!) 117 19 149/86 99 %      Temp Source Heart Rate Source Patient Position - Orthostatic VS BP Location FiO2 (%)   Oral Monitor Lying Left arm --      Pain Score       6         Vitals:    03/09/18 1942 03/09/18 2116   BP: 149/86 140/80   BP Location: Left arm Right arm   Pulse: (!) 117 105   Resp: 19 16   Temp: 98 4 °F (36 9 °C)    TempSrc: Oral    SpO2: 99% 99%   Weight: 123 kg (271 lb 6 2 oz)    Height: 5' 1" (1 549 m)      Physical Exam   Constitutional: She is oriented to person, place, and time  She appears well-developed and well-nourished  No distress  Patient morbidly obese  Generally poor hygiene  HENT:   Head: Normocephalic and atraumatic  Right Ear: External ear normal    Left Ear: External ear normal    Nose: Nose normal    Mouth/Throat: Oropharynx is clear and moist  No oropharyngeal exudate  Eyes: Conjunctivae and EOM are normal  Pupils are equal, round, and reactive to light  Neck: Normal range of motion  Neck supple  No JVD present  Cardiovascular: Normal rate, regular rhythm, normal heart sounds and intact distal pulses  Exam reveals no gallop and no friction rub  No murmur heard  Pulmonary/Chest: Effort normal and breath sounds normal  No respiratory distress  She has no wheezes  She has no rales  She exhibits no tenderness  Abdominal: Soft  Bowel sounds are normal  She exhibits no distension  There is no tenderness  There is no rebound and no guarding  Musculoskeletal: Normal range of motion  She exhibits no edema or tenderness  No midline cervical, thoracic or lumbar spine tenderness  No step-offs  No significant bilateral paravertebral muscle tenderness or hypertonicity  Left anterior shin has areas of ecchymosis and superficial abrasion with associated tenderness  Lymphadenopathy:     She has no cervical adenopathy  Neurological: She is alert and oriented to person, place, and time  No cranial nerve deficit  Chronic right upper and lower extremity weakness, about 3/5 strength  Normal left upper and lower extremity motor and sensation  Skin: Skin is warm and dry  No rash noted  She is not diaphoretic  No pallor  Psychiatric: She has a normal mood and affect  Her behavior is normal    Nursing note and vitals reviewed  ED Medications  Medications   acetaminophen (TYLENOL) tablet 650 mg (650 mg Oral Given 3/9/18 2115)       Diagnostic Studies  Results Reviewed     None                 XR tibia fibula 2 views LEFT   ED Interpretation by Gorge Saucedo DO (03/09 2054)   No acute osseous abnormality  Final Result by Estela Jensen MD (03/09 2116)      No acute osseous abnormality           Workstation performed: [id number]                    Procedures  Pelvic Ultrasound  Performed by: Trupti Martin  Authorized by: Turpti Martin     Procedure date/time:  3/9/2018 9:16 PM  Patient location:  ED  Other Assisting Provider: No    Procedure details:     Indications comment:  Pregnant with recent fall but no abdominal trauma    Assess:  Fetal viability    Technique:  Transabdominal obstetric (limited) exam  Uterine findings:     Intrauterine pregnancy: identified      Fetal heart rate: identified      Fetal heart rate (bpm):  148  Other findings:     Free pelvic fluid: not identified    Comments:      Good fetal movement  Phone Contacts  ED Phone Contact    ED Course  ED Course                                MDM  Number of Diagnoses or Management Options  Contusion of left lower leg, initial encounter:   Second trimester pregnancy:   Diagnosis management comments: 43-year-old pregnant female presents status post mechanical fall due to slip on ice, landing on her knees and striking her left shin  She denies any abdominal trauma and denies any abdominal pain, leakage of fluid or vaginal bleeding  Denies head injury  Will obtain x-ray of left tib/fib to rule out acute fracture but most likely patient has a contusion  Will give Tylenol for pain  Will perform bedside transabdominal ultrasound to assess for fetal heart tones and movement  I do not feel patient requires any further workup as her injuries are isolated to the lower extremities and there was no traumatic injury of the abdomen  Amount and/or Complexity of Data Reviewed  Tests in the radiology section of CPT®: ordered and reviewed  Independent visualization of images, tracings, or specimens: yes      CritCare Time    Disposition  Final diagnoses:   Contusion of left lower leg, initial encounter   Second trimester pregnancy     Time reflects when diagnosis was documented in both MDM as applicable and the Disposition within this note     Time User Action Codes Description Comment    3/9/2018  9:18 PM Alem Chaudhary [S80 [id number]] Contusion of left lower leg, initial encounter     3/9/2018  9:18 PM Alem Chaudhary [[id number]] Second trimester pregnancy       ED Disposition     ED Disposition Condition Comment    Discharge  [facility] discharge to home/self care      Condition at discharge: Stable        Follow-up Information     Follow up With Specialties Details Why Contact Info Additional [address] Cruz Anton MD Internal Medicine Schedule an appointment as soon as possible for a visit  [address] [place] [address] Albert Velasquez       Ob/Gyn  Schedule an appointment as soon as possible for a visit       [facility] Emergency Medicine Go to If symptoms worsen [address] [facility] [place] [address]  [phone] [place] [facility], [address] [place], [place], [address]        Discharge Medication List as of 3/9/2018  9:21 PM      START taking these medications    Details   acetaminophen (TYLENOL) 325 mg tablet Take 2 tablets (650 mg total) by mouth every 6 (six) hours as needed for mild pain, moderate pain, headaches or fever, Starting Fri 3/9/2018, Print         CONTINUE these medications which have NOT CHANGED    Details   aspirin 81 mg chewable tablet Chew 81 mg daily, Starting Tue 3/7/2017, Historical Med      benzocaine-menthol-lanolin-aloe (DERMOPLAST) 20-0 5 % topical spray Apply 1 application topically every 6 (six) hours as needed for mild pain, Starting Mon 6/26/2017, No Print      docusate sodium (COLACE) 100 mg capsule Take 1 capsule by mouth 2 (two) times a day as needed for constipation, Starting Mon 6/26/2017, No Print      enoxaparin (LOVENOX) 40 mg/0 4 mL Inject 40 mg under the skin 2 (two) times a day, Starting Sat 2/25/2017, Historical Med      EPINEPHrine (EPIPEN 2-TRENTON) 0 3 mg/0 3 mL SOAJ EpiPen 2-[place] 0 3 MG/0 3ML JORDON  INJECT INTO THE UPPER OUTER THIGH AS NEEDED FOR A SEVERE ALLERGIC REACTION   Quantity: 1;  Refills: 3      Katrin Tavarez ;  Start 15-Apr-2014  Active  2 EA Pen, Historical Med      !! ferrous sulfate 325 (65 Fe) mg tablet Take 325 mg by mouth daily, Starting Tue 3/7/2017, Historical Med      !! ferrous sulfate 325 (65 Fe) mg tablet Take 1 tablet by mouth Daily, Starting Tue 3/7/2017, Historical Med      folic acid (FOLVITE) 1 mg tablet Take 1 mg by mouth daily, Starting Tue 2/7/2017, Historical Med      labetalol (NORMODYNE) 200 mg tablet Take 200 mg by mouth 2 (two) times a day, Starting Fri 1/12/2018, Historical Med      !! NIFEdipine (ADALAT CC) 30 MG 24 hr tablet Take 1 tablet by mouth daily, Starting Mon 6/26/2017, No Print      !! NIFEdipine (PROCARDIA XL) 30 mg 24 hr tablet Take 1 tablet by mouth daily, Starting Mon 6/26/2017, Historical Med      Prenatal Multivit-Min-Fe-FA (PRENATAL VITAMINS PO) Take by mouth, Historical Med      Prenatal Vit-DSS-Fe Fum-FA (PRENATAL 19) tablet Take 1 tablet by mouth daily, Starting Tue 3/7/2017, Historical Med       !! - Potential duplicate medications found  Please discuss with provider  No discharge procedures on file      ED Provider  Electronically Signed by           Leann Ovalle DO  03/09/18 [id number]

## 2018-03-12 ENCOUNTER — TELEPHONE (OUTPATIENT)
Dept: OBGYN CLINIC | Facility: CLINIC | Age: 23
End: 2018-03-12

## 2018-03-13 ENCOUNTER — ROUTINE PRENATAL (OUTPATIENT)
Dept: PERINATAL CARE | Facility: MEDICAL CENTER | Age: 23
End: 2018-03-13
Payer: COMMERCIAL

## 2018-03-13 ENCOUNTER — TRANSCRIBE ORDERS (OUTPATIENT)
Dept: OBGYN CLINIC | Facility: MEDICAL CENTER | Age: 23
End: 2018-03-13

## 2018-03-13 VITALS
DIASTOLIC BLOOD PRESSURE: 90 MMHG | HEART RATE: 109 BPM | BODY MASS INDEX: 50.41 KG/M2 | WEIGHT: 267 LBS | SYSTOLIC BLOOD PRESSURE: 126 MMHG | HEIGHT: 61 IN

## 2018-03-13 DIAGNOSIS — Z86.718 HISTORY OF DEEP VEIN THROMBOSIS DURING PREGNANCY: ICD-10-CM

## 2018-03-13 DIAGNOSIS — Z3A.20 20 WEEKS GESTATION OF PREGNANCY: ICD-10-CM

## 2018-03-13 DIAGNOSIS — O10.919 CHRONIC HYPERTENSION AFFECTING PREGNANCY: Primary | ICD-10-CM

## 2018-03-13 DIAGNOSIS — Z36.3 ENCOUNTER FOR ANTENATAL SCREENING FOR MALFORMATIONS: ICD-10-CM

## 2018-03-13 DIAGNOSIS — O99.212 OBESITY AFFECTING PREGNANCY IN SECOND TRIMESTER: ICD-10-CM

## 2018-03-13 DIAGNOSIS — Z36.86 ENCOUNTER FOR ANTENATAL SCREENING FOR CERVICAL LENGTH: ICD-10-CM

## 2018-03-13 DIAGNOSIS — Z87.59 HISTORY OF DEEP VEIN THROMBOSIS DURING PREGNANCY: ICD-10-CM

## 2018-03-13 PROCEDURE — 76817 TRANSVAGINAL US OBSTETRIC: CPT | Performed by: OBSTETRICS & GYNECOLOGY

## 2018-03-13 PROCEDURE — 76811 OB US DETAILED SNGL FETUS: CPT | Performed by: OBSTETRICS & GYNECOLOGY

## 2018-03-13 PROCEDURE — 99213 OFFICE O/P EST LOW 20 MIN: CPT | Performed by: OBSTETRICS & GYNECOLOGY

## 2018-03-13 RX ORDER — NIFEDIPINE 30 MG/1
30 TABLET, EXTENDED RELEASE ORAL DAILY
Qty: 90 TABLET | Refills: 3 | Status: SHIPPED | OUTPATIENT
Start: 2018-03-13 | End: 2018-06-05 | Stop reason: SDUPTHER

## 2018-03-13 RX ORDER — ASPIRIN 81 MG/1
81 TABLET, CHEWABLE ORAL DAILY
Qty: 90 TABLET | Refills: 3 | Status: SHIPPED | OUTPATIENT
Start: 2018-03-13 | End: 2020-06-01 | Stop reason: ALTCHOICE

## 2018-03-13 NOTE — PROGRESS NOTES
[facility]o: Ms Lanny Parish was seen today at 20w3d for anatomic survey and cervical length screening ultrasound  See ultrasound report under "OB Procedures" tab  Please don't hesitate to contact our office with any concerns or questions    David Koroma MD

## 2018-03-13 NOTE — PATIENT INSTRUCTIONS
Please begin aspirin 81 mg low-dose for the prevention of preeclampsia  You are at high risk for recurrence of preeclampsia  Please continue your nifedipine 30 mg XL for treatment of your chronic hypertension  This is extremely important  This is a high-risk pregnancy and we recommend close follow-up with both your doctor's and also our office throughout the pregnancy  Please keep your weight gain to a minimum this pregnancy  Based on your starting body mass index this pregnancy the maximally gain for the pregnancy should be between 11 and 20 lb  It is also very important that you start on anticoagulation medication to prevent you from having another blood clot in your legs which could be life-threatening, however, I am not currently certain of the starting dose for you so I will try to contact you with a recommendation for the most appropriate dose of Lovenox

## 2018-03-16 ENCOUNTER — TELEPHONE (OUTPATIENT)
Dept: OBGYN CLINIC | Facility: CLINIC | Age: 23
End: 2018-03-16

## 2018-03-19 ENCOUNTER — TELEPHONE (OUTPATIENT)
Dept: PERINATAL CARE | Facility: CLINIC | Age: 23
End: 2018-03-19

## 2018-03-19 ENCOUNTER — TELEPHONE (OUTPATIENT)
Dept: OBGYN CLINIC | Facility: CLINIC | Age: 23
End: 2018-03-19

## 2018-03-19 DIAGNOSIS — Z86.718 HISTORY OF THROMBOEMBOLISM OF VEIN: Primary | ICD-10-CM

## 2018-03-19 NOTE — TELEPHONE ENCOUNTER
SARA Attending Joan Osborne):    Tried calling this patient at both numbers this morning regarding her inability to afford the Lovenox medication (I received an inbox message last week about this)  I messaged the CRNP who is seeing the patient next on 3/28/18 for assistance in getting this indicated medication  No answer at either of her numbers listed in EPIC so left messages instructing her to followup with the OB regarding this matter    Kevin Carrero MD

## 2018-03-20 ENCOUNTER — TELEPHONE (OUTPATIENT)
Dept: OBGYN CLINIC | Facility: CLINIC | Age: 23
End: 2018-03-20

## 2018-03-20 NOTE — TELEPHONE ENCOUNTER
Called pharmacy to make sure they have the Lovenox  rx ready for pt, called pt tt spouse, they said its not covered, lm again its covered and its needs to be started immediately

## 2018-03-28 ENCOUNTER — ROUTINE PRENATAL (OUTPATIENT)
Dept: OBGYN CLINIC | Facility: MEDICAL CENTER | Age: 23
End: 2018-03-28

## 2018-03-28 VITALS — BODY MASS INDEX: 49.5 KG/M2 | WEIGHT: 262 LBS | DIASTOLIC BLOOD PRESSURE: 80 MMHG | SYSTOLIC BLOOD PRESSURE: 124 MMHG

## 2018-03-28 DIAGNOSIS — Z34.92 ENCOUNTER FOR PREGNANCY RELATED EXAMINATION IN SECOND TRIMESTER: ICD-10-CM

## 2018-03-28 DIAGNOSIS — O09.892 HISTORY OF PRETERM DELIVERY, CURRENTLY PREGNANT IN SECOND TRIMESTER: ICD-10-CM

## 2018-03-28 DIAGNOSIS — Z86.718 HISTORY OF THROMBOEMBOLISM OF VEIN: ICD-10-CM

## 2018-03-28 DIAGNOSIS — Z3A.22 22 WEEKS GESTATION OF PREGNANCY: ICD-10-CM

## 2018-03-28 DIAGNOSIS — O10.919 CHRONIC HYPERTENSION AFFECTING PREGNANCY: ICD-10-CM

## 2018-03-28 DIAGNOSIS — O09.292 HISTORY OF PRE-ECLAMPSIA IN PRIOR PREGNANCY, CURRENTLY PREGNANT IN SECOND TRIMESTER: ICD-10-CM

## 2018-03-28 DIAGNOSIS — O34.219 HISTORY OF CESAREAN DELIVERY AFFECTING PREGNANCY: ICD-10-CM

## 2018-03-28 DIAGNOSIS — O09.92 SUPERVISION OF HIGH RISK PREGNANCY IN SECOND TRIMESTER: Primary | ICD-10-CM

## 2018-03-28 PROBLEM — O36.5990 FETAL GROWTH RETARDATION, ANTEPARTUM: Status: RESOLVED | Noted: 2017-06-24 | Resolved: 2018-03-28

## 2018-03-28 PROBLEM — O11.9 CHRONIC HYPERTENSION WITH SUPERIMPOSED PREECLAMPSIA: Status: RESOLVED | Noted: 2017-06-24 | Resolved: 2018-03-28

## 2018-03-28 PROBLEM — Z98.891 STATUS POST REPEAT LOW TRANSVERSE CESAREAN SECTION: Status: RESOLVED | Noted: 2017-06-24 | Resolved: 2018-03-28

## 2018-03-28 PROBLEM — F31.9 BIPOLAR DISORDER (HCC): Status: RESOLVED | Noted: 2017-02-07 | Resolved: 2018-03-28

## 2018-03-28 PROBLEM — F31.9 BIPOLAR DISORDER (HCC): Status: ACTIVE | Noted: 2017-02-07

## 2018-03-28 PROBLEM — Z3A.30 30 WEEKS GESTATION OF PREGNANCY: Status: RESOLVED | Noted: 2017-06-24 | Resolved: 2018-03-28

## 2018-03-28 PROBLEM — G81.91 HEMIPARESIS, RIGHT (HCC): Status: ACTIVE | Noted: 2017-02-07

## 2018-03-28 PROBLEM — I63.9 STROKE IN UTERO (HCC): Status: ACTIVE | Noted: 2017-02-07

## 2018-03-28 PROCEDURE — PNV: Performed by: NURSE PRACTITIONER

## 2018-03-28 NOTE — ASSESSMENT & PLAN NOTE
Ziyad Brush reports she was advised by [facility] that she should either take baby aspirin OR Lovenox, but not both meds concurrently  Reviewed Dr Elvia Tepmleton note with the patient and her partner during today's visit - clarified that recommendation was Lovenox 40 with LDASA or Lovenox 60 without LDASA  Reviewed risks/benefits of Lovenox and reinforced importance of compliance through preg and the postpartum period  She agrees to initiate Lovenox today  Ziyad Brush also reports there was a coverage issue at the pharmacy, however according to recent staff phone notes this is now resolved  Reviewed sx of DVT/PE and strongly advised low threshold for reporting abn sx

## 2018-03-28 NOTE — ASSESSMENT & PLAN NOTE
Denies OB complaints  Good fetal movement  Denies contractions, cramping, leakage of fluid or vaginal bleeding  SOB resolved  The patient reports missing Cardiology appt today - she has rescheduled this for ~2 weeks from now  L2 WNL  Reviewed reasons to call   RTO in 2 weeks

## 2018-03-28 NOTE — ASSESSMENT & PLAN NOTE
Normotensive on current regimen of procardia  Encouraged to continue LDASA for preE risk reddennis  Reviewed sx of preE and advised low threshold for calling

## 2018-03-28 NOTE — PROGRESS NOTES
Problem List Items Addressed This Visit     History of thromboembolism of vein     Kristin reports she was advised by MFM that she should either take baby aspirin OR Lovenox, but not both meds concurrently  Reviewed Dr Tuan Mckeon note with the patient and her partner during today's visit - clarified that recommendation was Lovenox 40 with LDASA or Lovenox 60 without LDASA  Reviewed risks/benefits of Lovenox and reinforced importance of compliance through preg and the postpartum period  She agrees to initiate Lovenox today  Rubenscarringtonernst Isaac also reports there was a coverage issue at the pharmacy, however according to recent staff phone notes this is now resolved  Reviewed sx of DVT/PE and strongly advised low threshold for reporting abn sx  History of pre-eclampsia in prior pregnancy, currently pregnant in second trimester     Baseline labs reviewed  Reviewed sx of preE and advise low threshold for calling  History of  delivery affecting pregnancy     Repeat C/S planned  BTL also planned - advised signing consents with MD/DO at next visit given [employer] coverage  Chronic hypertension affecting pregnancy     Normotensive on current regimen of procardia  Encouraged to continue LDASA for preE risk shaun  Reviewed sx of preE and advised low threshold for calling  History of  delivery, currently pregnant in second trimester    Encounter for pregnancy related examination in second trimester    Relevant Orders    CBC and differential    Glucose, 1H PG    RPR    Supervision of high risk pregnancy in second trimester - Primary     Denies OB complaints  Good fetal movement  Denies contractions, cramping, leakage of fluid or vaginal bleeding  SOB resolved  The patient reports missing Cardiology appt today - she has rescheduled this for ~2 weeks from now  L2 WNL  Reviewed reasons to call   RTO in 2 weeks             Other Visit Diagnoses     22 weeks gestation of pregnancy

## 2018-03-28 NOTE — ASSESSMENT & PLAN NOTE
Repeat C/S planned  BTL also planned - advised signing consents with MD/ at next visit given [facility] coverage

## 2018-04-02 ENCOUNTER — TELEPHONE (OUTPATIENT)
Dept: OBGYN CLINIC | Facility: CLINIC | Age: 23
End: 2018-04-02

## 2018-04-02 NOTE — TELEPHONE ENCOUNTER
Per Kk, to f/u with patient by phone weekly to encourage compliance with lovenox injections and scheduled apts

## 2018-04-18 ENCOUNTER — ROUTINE PRENATAL (OUTPATIENT)
Dept: OBGYN CLINIC | Facility: MEDICAL CENTER | Age: 23
End: 2018-04-18
Payer: COMMERCIAL

## 2018-04-18 VITALS — WEIGHT: 266 LBS | BODY MASS INDEX: 50.26 KG/M2 | SYSTOLIC BLOOD PRESSURE: 140 MMHG | DIASTOLIC BLOOD PRESSURE: 90 MMHG

## 2018-04-18 DIAGNOSIS — Z30.2 REQUEST FOR STERILIZATION: ICD-10-CM

## 2018-04-18 DIAGNOSIS — O99.212 MATERNAL MORBID OBESITY IN SECOND TRIMESTER, ANTEPARTUM (HCC): ICD-10-CM

## 2018-04-18 DIAGNOSIS — E66.01 MATERNAL MORBID OBESITY IN SECOND TRIMESTER, ANTEPARTUM (HCC): ICD-10-CM

## 2018-04-18 DIAGNOSIS — Z87.59 HISTORY OF DEEP VEIN THROMBOSIS (DVT) DURING PREGNANCY: ICD-10-CM

## 2018-04-18 DIAGNOSIS — O10.919 CHRONIC HYPERTENSION AFFECTING PREGNANCY: ICD-10-CM

## 2018-04-18 DIAGNOSIS — O09.292 HISTORY OF PRE-ECLAMPSIA IN PRIOR PREGNANCY, CURRENTLY PREGNANT IN SECOND TRIMESTER: ICD-10-CM

## 2018-04-18 DIAGNOSIS — Z86.718 HISTORY OF DEEP VEIN THROMBOSIS (DVT) DURING PREGNANCY: ICD-10-CM

## 2018-04-18 DIAGNOSIS — O09.92 SUPERVISION OF HIGH RISK PREGNANCY IN SECOND TRIMESTER: Primary | ICD-10-CM

## 2018-04-18 PROCEDURE — 99213 OFFICE O/P EST LOW 20 MIN: CPT | Performed by: NURSE PRACTITIONER

## 2018-04-18 NOTE — ASSESSMENT & PLAN NOTE
BP today is 140/90  She denies RUQ pain, headaches, SOB, or visual disturbances  She ran out of her blood pressure medication 2 days ago and did not yet  refills  Strongly encouraged them to proceed directly to pharmacy after this visit to  Procardia and to maintain compliance  They agree to do so

## 2018-04-18 NOTE — ASSESSMENT & PLAN NOTE
Luis Enrique Frankel has not yet gone for early glucola  She states that she forgot to do this  Encouraged her to do this, along with her other 24-28 week labs  They plan to go tomorrow morning  The lab slips were reprinted and given to them because they were left behind at previous house  Unfortunately, the couple recently lost their housing and are now staying with HANDY's mother  They report lots of family support and that they feel safe at home

## 2018-04-18 NOTE — ASSESSMENT & PLAN NOTE
Tenzin Inman reports compliance with daily subQ Lovenox injections  They say that they do not need refills at this time

## 2018-04-18 NOTE — ASSESSMENT & PLAN NOTE
Heidy Fajardo strongly desires permanent sterilization  I had Dr Janeth Redmond come into exam room to speak with Heidy Fajardo and obtain consent for tubal ligation  Heidy Fajardo consented, and the signed MA sterilization consent form will put into her chart

## 2018-04-18 NOTE — PROGRESS NOTES
Problem List Items Addressed This Visit     History of pre-eclampsia in prior pregnancy, currently pregnant in second trimester     BP today is 140/90  She denies RUQ pain, headaches, SOB, or visual disturbances  She ran out of her blood pressure medication 2 days ago and did not yet  refills  Strongly encouraged them to proceed directly to pharmacy after this visit to  Procardia and to maintain compliance  They agree to do so  Chronic hypertension affecting pregnancy    Supervision of high risk pregnancy in second trimester - Primary     Denies LOF, VB, or CTX  Reports good fetal movement  She did not go to cardiology appointment that was scheduled for 3/28 (baseline ECHO ordered by [facility] for indication of multiple medical comorbidities)  The couple says that they plan to call cardiology today to reschedule the appointment  They confirm that they have the phone number needed to call  RTO 2 weeks  Maternal morbid obesity in second trimester, antepartum [facility])     Matthewhitesh Clara has not yet gone for early glucola  She states that she forgot to do this  Encouraged her to do this, along with her other 24-28 week labs  They plan to go tomorrow morning  The lab slips were reprinted and given to them because they were left behind at previous house  Unfortunately, the couple recently lost their housing and are now staying with FOB's mother  They report lots of family support and that they feel safe at home  History of deep vein thrombosis (DVT) during pregnancy     Kristin reports compliance with daily subQ Lovenox injections  They say that they do not need refills at this time  Request for sterilization     Piyush Elizabeth strongly desires permanent sterilization  I had Dr Orlando Peterson come into exam room to speak with Piyush Elizabeth and obtain consent for tubal ligation  Piyush Elizabeth consented, and the signed MA sterilization consent form will put into her chart               [facility]

## 2018-05-24 ENCOUNTER — ROUTINE PRENATAL (OUTPATIENT)
Dept: OBGYN CLINIC | Facility: MEDICAL CENTER | Age: 23
End: 2018-05-24
Payer: COMMERCIAL

## 2018-05-24 VITALS
BODY MASS INDEX: 51.09 KG/M2 | SYSTOLIC BLOOD PRESSURE: 126 MMHG | WEIGHT: 270.6 LBS | HEIGHT: 61 IN | DIASTOLIC BLOOD PRESSURE: 68 MMHG

## 2018-05-24 DIAGNOSIS — O99.212 MATERNAL MORBID OBESITY IN SECOND TRIMESTER, ANTEPARTUM (HCC): ICD-10-CM

## 2018-05-24 DIAGNOSIS — Z87.59 HISTORY OF DEEP VEIN THROMBOSIS (DVT) DURING PREGNANCY: ICD-10-CM

## 2018-05-24 DIAGNOSIS — E66.01 MATERNAL MORBID OBESITY IN SECOND TRIMESTER, ANTEPARTUM (HCC): ICD-10-CM

## 2018-05-24 DIAGNOSIS — Z3A.30 30 WEEKS GESTATION OF PREGNANCY: Primary | ICD-10-CM

## 2018-05-24 DIAGNOSIS — Z86.718 HISTORY OF DEEP VEIN THROMBOSIS (DVT) DURING PREGNANCY: ICD-10-CM

## 2018-05-24 PROCEDURE — 99213 OFFICE O/P EST LOW 20 MIN: CPT | Performed by: OBSTETRICS & GYNECOLOGY

## 2018-05-24 NOTE — PROGRESS NOTES
Patient is a 42-year-old female para 4 at 30 weeks and 5 days here for prenatal visit  Patient denies symptoms of preeclampsia loss of fluid vaginal bleeding or regular contractions  Patient reports normal fetal movement  History of DVT, Depression, 3 prior C-sections, and preeclampsia in a previous pregnancy

## 2018-06-05 ENCOUNTER — ROUTINE PRENATAL (OUTPATIENT)
Dept: OBGYN CLINIC | Age: 23
End: 2018-06-05
Payer: COMMERCIAL

## 2018-06-05 VITALS — WEIGHT: 268 LBS | DIASTOLIC BLOOD PRESSURE: 78 MMHG | BODY MASS INDEX: 50.64 KG/M2 | SYSTOLIC BLOOD PRESSURE: 124 MMHG

## 2018-06-05 DIAGNOSIS — Z86.718 HISTORY OF DEEP VEIN THROMBOSIS (DVT) DURING PREGNANCY: ICD-10-CM

## 2018-06-05 DIAGNOSIS — G81.91 HEMIPARESIS, RIGHT (HCC): ICD-10-CM

## 2018-06-05 DIAGNOSIS — O10.919 CHRONIC HYPERTENSION AFFECTING PREGNANCY: ICD-10-CM

## 2018-06-05 DIAGNOSIS — Z30.2 REQUEST FOR STERILIZATION: ICD-10-CM

## 2018-06-05 DIAGNOSIS — O09.293 HISTORY OF PRE-ECLAMPSIA IN PRIOR PREGNANCY, CURRENTLY PREGNANT, THIRD TRIMESTER: ICD-10-CM

## 2018-06-05 DIAGNOSIS — O99.213 OBESITY AFFECTING PREGNANCY IN THIRD TRIMESTER: ICD-10-CM

## 2018-06-05 DIAGNOSIS — Z87.59 HISTORY OF DEEP VEIN THROMBOSIS (DVT) DURING PREGNANCY: ICD-10-CM

## 2018-06-05 DIAGNOSIS — Z86.718 HISTORY OF THROMBOEMBOLISM OF VEIN: ICD-10-CM

## 2018-06-05 DIAGNOSIS — Z3A.32 32 WEEKS GESTATION OF PREGNANCY: ICD-10-CM

## 2018-06-05 DIAGNOSIS — O09.93 PREGNANCY, SUPERVISION, HIGH-RISK, THIRD TRIMESTER: Primary | ICD-10-CM

## 2018-06-05 PROBLEM — Z34.92 ENCOUNTER FOR PREGNANCY RELATED EXAMINATION IN SECOND TRIMESTER: Status: RESOLVED | Noted: 2018-03-28 | Resolved: 2018-06-05

## 2018-06-05 PROCEDURE — 99213 OFFICE O/P EST LOW 20 MIN: CPT | Performed by: NURSE PRACTITIONER

## 2018-06-05 NOTE — PATIENT INSTRUCTIONS
Pregnancy at 31 to 34 Weeks   AMBULATORY CARE:   What changes are happening to your body: You may continue to have symptoms such as shortness of breath, heartburn, contractions, or swelling of your ankles and feet  You may be gaining about 1 pound a week now  Seek care immediately if:   · You develop a severe headache that does not go away  · You have new or increased vision changes, such as blurred or spotted vision  · You have new or increased swelling in your face or hands  · You have vaginal spotting or bleeding  · Your water broke or you feel warm water gushing or trickling from your vagina  Contact your healthcare provider if:   · You have more than 5 contractions in 1 hour  · You notice any changes in your baby's movements  · You have abdominal cramps, pressure, or tightening  · You have a change in vaginal discharge  · You have chills or a fever  · You have vaginal itching, burning, or pain  · You have yellow, green, white, or foul-smelling vaginal discharge  · You have pain or burning when you urinate, less urine than usual, or pink or bloody urine  · You have questions or concerns about your condition or care  How to care for yourself at this stage of your pregnancy:   · Eat a variety of healthy foods  Healthy foods include fruits, vegetables, whole-grain breads, low-fat dairy foods, beans, lean meats, and fish  Drink liquids as directed  Ask how much liquid to drink each day and which liquids are best for you  Limit caffeine to less than 200 milligrams each day  Limit your intake of fish to 2 servings each week  Choose fish low in mercury such as canned light tuna, shrimp, salmon, cod, or tilapia  Do not  eat fish high in mercury such as swordfish, tilefish, rasheed mackerel, and shark  · Manage heartburn  by eating 4 or 5 small meals each day instead of large meals  Avoid spicy food  · Manage swelling  by lying down and putting your feet up       · Take prenatal vitamins as directed  Your need for certain vitamins and minerals, such as folic acid, increases during pregnancy  Prenatal vitamins provide some of the extra vitamins and minerals you need  Prenatal vitamins may also help to decrease the risk of certain birth defects  · Talk to your healthcare provider about exercise  Moderate exercise can help you stay fit  Your healthcare provider will help you plan an exercise program that is safe for you during pregnancy  · Do not smoke  If you smoke, it is never too late to quit  Smoking increases your risk of a miscarriage and other health problems during your pregnancy  Smoking can cause your baby to be born too early or weigh less at birth  Ask your healthcare provider for information if you need help quitting  · Do not drink alcohol  Alcohol passes from your body to your baby through the placenta  It can affect your baby's brain development and cause fetal alcohol syndrome (FAS)  FAS is a group of conditions that causes mental, behavior, and growth problems  · Talk to your healthcare provider before you take any medicines  Many medicines may harm your baby if you take them when you are pregnant  Do not take any medicines, vitamins, herbs, or supplements without first talking to your healthcare provider  Never use illegal or street drugs (such as marijuana or cocaine) while you are pregnant  Safety tips during pregnancy:   · Avoid hot tubs and saunas  Do not use a hot tub or sauna while you are pregnant, especially during your first trimester  Hot tubs and saunas may raise your baby's temperature and increase the risk of birth defects  · Avoid toxoplasmosis  This is an infection caused by eating raw meat or being around infected cat feces  It can cause birth defects, miscarriages, and other problems  Wash your hands after you touch raw meat  Make sure any meat is well-cooked before you eat it  Avoid raw eggs and unpasteurized milk   Use gloves or ask someone else to clean your cat's litter box while you are pregnant  Changes that are happening with your baby:  By 34 weeks, your baby may weigh more than 5 pounds  Your baby will be about 12 ½ inches long from the top of the head to the rump (baby's bottom)  Your baby is gaining about ½ pound a week  Your baby's eyes open and close now  Your baby's kicks and movements are more forceful at this time  What you need to know about prenatal care: Your healthcare provider will check your blood pressure and weight  You may also need the following:  · A urine test  may also be done to check for sugar and protein  These can be signs of gestational diabetes or infection  Protein in your urine may also be a sign of preeclampsia  Preeclampsia is a condition that can develop during week 20 or later of your pregnancy  It causes high blood pressure, and it can cause problems with your kidneys and other organs  · A Tdap vaccine  may be recommended by your healthcare provider  · Fundal height  is a measurement of your uterus to check your baby's growth  This number is usually the same as the number of weeks that you have been pregnant  Your healthcare provider may also check your baby's position  · Your baby's heart rate  will be checked  © 2017 [employer]  Information is for End User's use only and may not be sold, redistributed or otherwise used for commercial purposes  All illustrations and images included in CareNotes® are the copyrighted property of [employer]  or [employer]  The above information is an  only  It is not intended as medical advice for individual conditions or treatments  Talk to your doctor, nurse or pharmacist before following any medical regimen to see if it is safe and effective for you

## 2018-06-05 NOTE — PROGRESS NOTES
Problem List Items Addressed This Visit     History of thromboembolism of vein    RESOLVED: 32 weeks gestation of pregnancy    History of pre-eclampsia in prior pregnancy, currently pregnant, third trimester    Chronic hypertension affecting pregnancy    Pregnancy, supervision, high-risk, third trimester - Primary    Hemiparesis, right ([address] )    Obesity affecting pregnancy in third trimester    History of deep vein thrombosis (DVT) during pregnancy    Request for sterilization        Here with FOB  Feels well  Poor compliance with appts and management  She has not seen [facility]  [facility] since March 2018  Is currently on lovenox/asa  Has not done 28 wk labs yet-promises to go next door TODAY  Has not scheduled her maternal echo and will also go upstairs today to schedule that as well  States she is schedule for Repeat Csection/BTL on 7/24 with Dr Goldy Aiken (sent a message to Sivan to confirm)  DECLINES TDAP  Denies LOF/CTX/VB  Message to Nara Cherry  to facilitate a timely [address]  [facility] appointment

## 2018-06-06 ENCOUNTER — TELEPHONE (OUTPATIENT)
Dept: OBGYN CLINIC | Facility: CLINIC | Age: 23
End: 2018-06-06

## 2018-06-06 NOTE — TELEPHONE ENCOUNTER
----- Message from Rafaela Bonds sent at 6/5/2018 11:43 AM EDT -----  Regarding: Perinatology  Hi Rios Castro,     This OB patient has not seen Perinatology since 3/2018 and has MULTIPLE medical issues  She currently has an appointment in July but she needs to be seen ASAP  Can you facilitate this please?  Her contact number was updated and she can be reached at [phone]

## 2018-06-06 NOTE — TELEPHONE ENCOUNTER
Spoke with the patient and with M  They will see her this Friday 6/8 @ 3pm  Stressed to pt the importance of keeping this appointment  Also, asked MFM to contact [address] if the patient is a Tre[address]

## 2018-06-08 ENCOUNTER — ULTRASOUND (OUTPATIENT)
Dept: PERINATAL CARE | Facility: MEDICAL CENTER | Age: 23
End: 2018-06-08
Payer: COMMERCIAL

## 2018-06-08 VITALS
WEIGHT: 266 LBS | HEIGHT: 61 IN | SYSTOLIC BLOOD PRESSURE: 138 MMHG | BODY MASS INDEX: 50.22 KG/M2 | HEART RATE: 101 BPM | DIASTOLIC BLOOD PRESSURE: 82 MMHG

## 2018-06-08 DIAGNOSIS — O10.913 MATERNAL CHRONIC HYPERTENSION, THIRD TRIMESTER: Primary | ICD-10-CM

## 2018-06-08 DIAGNOSIS — O99.213 MATERNAL MORBID OBESITY IN THIRD TRIMESTER, ANTEPARTUM (HCC): ICD-10-CM

## 2018-06-08 DIAGNOSIS — Z87.59 HISTORY OF DEEP VEIN THROMBOSIS (DVT) DURING PREGNANCY: ICD-10-CM

## 2018-06-08 DIAGNOSIS — Z3A.32 32 WEEKS GESTATION OF PREGNANCY: ICD-10-CM

## 2018-06-08 DIAGNOSIS — E66.01 MATERNAL MORBID OBESITY IN THIRD TRIMESTER, ANTEPARTUM (HCC): ICD-10-CM

## 2018-06-08 DIAGNOSIS — Z86.718 HISTORY OF DEEP VEIN THROMBOSIS (DVT) DURING PREGNANCY: ICD-10-CM

## 2018-06-08 PROCEDURE — 59025 FETAL NON-STRESS TEST: CPT | Performed by: OBSTETRICS & GYNECOLOGY

## 2018-06-08 PROCEDURE — 76816 OB US FOLLOW-UP PER FETUS: CPT | Performed by: OBSTETRICS & GYNECOLOGY

## 2018-06-08 PROCEDURE — 99212 OFFICE O/P EST SF 10 MIN: CPT | Performed by: OBSTETRICS & GYNECOLOGY

## 2018-06-08 NOTE — PROGRESS NOTES
Please refer to the [facility] ultrasound report in Ob Procedures for additional information regarding the visit to the [facility]  today

## 2018-06-08 NOTE — PROGRESS NOTES
NST procedure and expected outcome explained to patient  Daily fetal kick count reviewed  Patient verbalized understanding of all and was receptive      Scott Sarah RN

## 2018-06-08 NOTE — LETTER
June 8, 2018     Liberty Franz MD  [address] [place] [id number]    Patient: Fatoumata Cochran   YOB: 1995   Date of Visit: 6/8/2018       Dear Dr Marcelle Hoang: Thank you for referring Fatoumata Cochran to me for evaluation  Below are my notes for this consultation  If you have questions, please do not hesitate to call me  I look forward to following your patient along with you  Sincerely,        Raz De Jesus MD        CC: No Recipients  Raz De Jesus MD  6/8/2018  3:33 PM  Sign at close encounter  Please refer to the [facility] ultrasound report in Ob Procedures for additional information regarding the visit to the [facility]  today

## 2018-06-12 ENCOUNTER — ROUTINE PRENATAL (OUTPATIENT)
Dept: PERINATAL CARE | Facility: MEDICAL CENTER | Age: 23
End: 2018-06-12
Payer: COMMERCIAL

## 2018-06-12 ENCOUNTER — TELEPHONE (OUTPATIENT)
Dept: OBGYN CLINIC | Facility: CLINIC | Age: 23
End: 2018-06-12

## 2018-06-12 VITALS
WEIGHT: 263.3 LBS | SYSTOLIC BLOOD PRESSURE: 135 MMHG | HEIGHT: 61 IN | BODY MASS INDEX: 49.71 KG/M2 | DIASTOLIC BLOOD PRESSURE: 61 MMHG | HEART RATE: 102 BPM

## 2018-06-12 DIAGNOSIS — O99.213 OBESITY AFFECTING PREGNANCY IN THIRD TRIMESTER: ICD-10-CM

## 2018-06-12 DIAGNOSIS — Z87.59 HISTORY OF DEEP VEIN THROMBOSIS (DVT) DURING PREGNANCY: ICD-10-CM

## 2018-06-12 DIAGNOSIS — O10.913 CHRONIC HYPERTENSION IN OBSTETRIC CONTEXT IN THIRD TRIMESTER: ICD-10-CM

## 2018-06-12 DIAGNOSIS — Z86.718 HISTORY OF DEEP VEIN THROMBOSIS (DVT) DURING PREGNANCY: ICD-10-CM

## 2018-06-12 DIAGNOSIS — Z3A.33 33 WEEKS GESTATION OF PREGNANCY: Primary | ICD-10-CM

## 2018-06-12 PROCEDURE — 59025 FETAL NON-STRESS TEST: CPT | Performed by: OBSTETRICS & GYNECOLOGY

## 2018-06-12 NOTE — PROGRESS NOTES
NST procedure and expected outcome explained to patient  Daily fetal kick count reviewed  Patient verbalized understanding of all and was receptive      Lety Denise RN

## 2018-06-12 NOTE — TELEPHONE ENCOUNTER
I received a call from Raj Gutierrez, he needs to know when the  is scheduled, he says he needs to give his employer 1 month notice  He said he was told it will be on  but said he was told it is not scheduled  He wants us to call him or [place]   His number is [phone]

## 2018-06-15 NOTE — TELEPHONE ENCOUNTER
I left a voice mail on patient voice mail, unable to leave message for Janet Oh, no voice mail box    is on

## 2018-06-20 ENCOUNTER — ROUTINE PRENATAL (OUTPATIENT)
Dept: OBGYN CLINIC | Facility: CLINIC | Age: 23
End: 2018-06-20
Payer: COMMERCIAL

## 2018-06-20 VITALS — WEIGHT: 267.2 LBS | DIASTOLIC BLOOD PRESSURE: 78 MMHG | SYSTOLIC BLOOD PRESSURE: 122 MMHG | BODY MASS INDEX: 50.49 KG/M2

## 2018-06-20 DIAGNOSIS — O99.213 OBESITY AFFECTING PREGNANCY IN THIRD TRIMESTER: ICD-10-CM

## 2018-06-20 DIAGNOSIS — Z30.2 REQUEST FOR STERILIZATION: ICD-10-CM

## 2018-06-20 DIAGNOSIS — O34.219 HISTORY OF CESAREAN DELIVERY AFFECTING PREGNANCY: ICD-10-CM

## 2018-06-20 DIAGNOSIS — Z86.718 HISTORY OF DEEP VEIN THROMBOSIS (DVT) DURING PREGNANCY: ICD-10-CM

## 2018-06-20 DIAGNOSIS — Z87.59 HISTORY OF DEEP VEIN THROMBOSIS (DVT) DURING PREGNANCY: ICD-10-CM

## 2018-06-20 DIAGNOSIS — Z34.83 PRENATAL CARE, SUBSEQUENT PREGNANCY, THIRD TRIMESTER: Primary | ICD-10-CM

## 2018-06-20 DIAGNOSIS — O10.913 CHRONIC HYPERTENSION IN OBSTETRIC CONTEXT IN THIRD TRIMESTER: ICD-10-CM

## 2018-06-20 PROBLEM — Z3A.33 33 WEEKS GESTATION OF PREGNANCY: Status: RESOLVED | Noted: 2018-06-12 | Resolved: 2018-06-20

## 2018-06-20 PROCEDURE — 99213 OFFICE O/P EST LOW 20 MIN: CPT | Performed by: OBSTETRICS & GYNECOLOGY

## 2018-06-20 NOTE — PROGRESS NOTES
Problem List Items Addressed This Visit        Cardiovascular and Mediastinum    Chronic hypertension in obstetric context in third trimester     NST today at [facility]            Other    History of  delivery affecting pregnancy     Repeat C Section and bilateral salpingectomy moved to  at 0800 with Dr Harini Galaviz         Obesity affecting pregnancy in third trimester    History of deep vein thrombosis (DVT) during pregnancy     Taking her Lovenox         Request for sterilization - Primary     Consents obtained both for the [facility]         Prenatal care, subsequent pregnancy, third trimester     Has no complaints, nl FM, not wilfrido, NST today

## 2018-07-03 ENCOUNTER — HOSPITAL ENCOUNTER (OUTPATIENT)
Facility: HOSPITAL | Age: 23
Discharge: HOME | End: 2018-07-03
Attending: OBSTETRICS & GYNECOLOGY | Admitting: OBSTETRICS & GYNECOLOGY
Payer: COMMERCIAL

## 2018-07-03 ENCOUNTER — TELEPHONE (OUTPATIENT)
Dept: OBGYN CLINIC | Facility: CLINIC | Age: 23
End: 2018-07-03

## 2018-07-03 VITALS
HEART RATE: 104 BPM | HEIGHT: 61 IN | DIASTOLIC BLOOD PRESSURE: 82 MMHG | WEIGHT: 272 LBS | RESPIRATION RATE: 20 BRPM | SYSTOLIC BLOOD PRESSURE: 129 MMHG | TEMPERATURE: 99.8 F | BODY MASS INDEX: 51.35 KG/M2

## 2018-07-03 PROCEDURE — 99213 OFFICE O/P EST LOW 20 MIN: CPT

## 2018-07-03 NOTE — PROGRESS NOTES
Triage Note - OB  Sintia Galaviz 25 y o  female MRN: [id number]  Unit/Bed#: LD Triage 2- Encounter: [id number]    OB TRIAGE NOTE  Sintia Galaviz  [id number]  7/3/2018  6:37 PM  LD Triage 2/LD Triage 2-*    ASSESS:  25 y o  [id number] 36w3d with history of three prior  sections in triage NOT in  labor  PLAN:  D/C home with labor precautions  PO hydration  F/u appointment in office this week  For scheduled RLTCS & BTL 2018  D/W Dr Ray Raid   ______________    SUBJECTIVE    AIDAN: Estimated Date of Delivery: 18    HPI Chronology:  25 y o  [id number] 36w3d with  presents with complaint of contractions  Contractions started 3 days previously and became more painful today  Located in lower right abdomen and radiates to back  Occurs every 2 hours and last for about 5 minutes  Tried water and tylenol but did not help       Pregnancy pertinent hx:  -3 prior  sections  -morbid obesity  -right-sided paralysis  -history of DVT (stopped her own Lovenox in this pregnancy approximately about 3 months ago), this DVT was diagnosed in postpartum of last pregnancy  -cHTN well controlled with Procardia XL 30mg qd    Contractions: yes  Leakage: no  Bleeding: no   Fetal Movement: yes  Pelvic pain: yes    Vitals:   Vitals:    18 1627   BP: 129/82   Pulse:    Resp:    Temp:        Review of Systems   No nausea/vomiting  No changes in vision  No chest pain  No shortness of breath    Physical Exam     General: No acute distress, patient sitting comfortably in bed  Heart: regular rate and rhythm, no murmurs, rubs  Lungs: CTAB  Abdomen: Gravid non tender  Extremities: mild swelling in bilaterally extremities; patient has paralysis in right upper and lower extremities    FHT:  Baseline Rate: 140 bpm  Variability: Moderate 6-25 bpm  Accelerations: 15 x 15 or greater, With fetal movment  Decelerations: None  FHR Category: Category I     TOCO: No contractions  Contraction Frequency (minutes): none    Labs: No results found for this or any previous visit (from the past 24 hour(s))  Lab, Imaging and other studies: I have personally reviewed pertinent reports          Ilda Carreon MD  7/3/2018  6:37 PM

## 2018-07-03 NOTE — TELEPHONE ENCOUNTER
Pt 36 wks, she is paralyzed on right side so only feels pressure per Sister In Memory Rape is stating that pt has a lot of pressure for 2 days, she  is sched for CS 7/27,  Pt does not have service where she is located thus sister in law is calling,  pls call Esmer Prater & advise  thanks

## 2018-07-03 NOTE — TELEPHONE ENCOUNTER
Called pt sister in law Yanely Hays back - advised her that [address] would likt pt to come to L&D - stated she will call someone to bring her in  Called L&D to give them a heads up

## 2018-07-03 NOTE — TELEPHONE ENCOUNTER
Spoke with pt sister-in-law - no consent form: Patient is 36w3d,  - states the pt feels pressure in lower abdomen pelvic area  Pt is paralyzed on right side from waist down so will not feel if she is having contractions  States the baby feels lower also  She si scheduled for C/S   Patient is worried  Will send to on call doctor

## 2018-07-11 ENCOUNTER — ROUTINE PRENATAL (OUTPATIENT)
Dept: OBGYN CLINIC | Age: 23
End: 2018-07-11
Payer: COMMERCIAL

## 2018-07-11 ENCOUNTER — TELEPHONE (OUTPATIENT)
Dept: OBGYN CLINIC | Facility: CLINIC | Age: 23
End: 2018-07-11

## 2018-07-11 VITALS — WEIGHT: 267.5 LBS | DIASTOLIC BLOOD PRESSURE: 92 MMHG | BODY MASS INDEX: 50.54 KG/M2 | SYSTOLIC BLOOD PRESSURE: 128 MMHG

## 2018-07-11 DIAGNOSIS — Z86.718 HISTORY OF DEEP VEIN THROMBOSIS (DVT) DURING PREGNANCY: ICD-10-CM

## 2018-07-11 DIAGNOSIS — O99.213 OBESITY AFFECTING PREGNANCY IN THIRD TRIMESTER: ICD-10-CM

## 2018-07-11 DIAGNOSIS — Z87.59 HISTORY OF DEEP VEIN THROMBOSIS (DVT) DURING PREGNANCY: ICD-10-CM

## 2018-07-11 DIAGNOSIS — O10.913 CHRONIC HYPERTENSION IN OBSTETRIC CONTEXT IN THIRD TRIMESTER: ICD-10-CM

## 2018-07-11 DIAGNOSIS — Z34.83 PRENATAL CARE, SUBSEQUENT PREGNANCY, THIRD TRIMESTER: Primary | ICD-10-CM

## 2018-07-11 PROCEDURE — 99213 OFFICE O/P EST LOW 20 MIN: CPT | Performed by: OBSTETRICS & GYNECOLOGY

## 2018-07-11 NOTE — PROGRESS NOTES
Problem List Items Addressed This Visit        Cardiovascular and Mediastinum    Chronic hypertension in obstetric context in third trimester      Today blood pressure is slightly elevated   patient admitted having taken her blood pressure medicine yesterday and today she will take it soon            Other    Obesity affecting pregnancy in third trimester    History of deep vein thrombosis (DVT) during pregnancy      Patient stopped using her Lovenox   is taking baby aspirin         Prenatal care, subsequent pregnancy, third trimester - Primary      Seen in Labor and delivery recently for abdominal pain   as per patient GBS was taken   patient would like her    section date be moved up to an earlier date   patient has normal fetal movements

## 2018-07-11 NOTE — TELEPHONE ENCOUNTER
Pt was seen by Dr Deborah Mujica today, but having back pain and she is concerned that she was not heard   Back pain is becoming worse and is confused on what to do

## 2018-07-11 NOTE — ASSESSMENT & PLAN NOTE
Today blood pressure is slightly elevated   patient admitted having taken her blood pressure medicine yesterday and today she will take it soon

## 2018-07-11 NOTE — PROGRESS NOTES
Per patient states she had her GBS done in L&D on 7/3/18 but after checking the notes no labs were ordered

## 2018-07-13 ENCOUNTER — ULTRASOUND (OUTPATIENT)
Dept: PERINATAL CARE | Facility: MEDICAL CENTER | Age: 23
End: 2018-07-13
Payer: COMMERCIAL

## 2018-07-13 VITALS
DIASTOLIC BLOOD PRESSURE: 100 MMHG | SYSTOLIC BLOOD PRESSURE: 150 MMHG | WEIGHT: 267 LBS | BODY MASS INDEX: 50.41 KG/M2 | HEART RATE: 106 BPM | HEIGHT: 61 IN

## 2018-07-13 DIAGNOSIS — E66.01 MATERNAL MORBID OBESITY IN THIRD TRIMESTER, ANTEPARTUM (HCC): ICD-10-CM

## 2018-07-13 DIAGNOSIS — Z3A.37 37 WEEKS GESTATION OF PREGNANCY: ICD-10-CM

## 2018-07-13 DIAGNOSIS — O10.913 CHRONIC HYPERTENSION IN OBSTETRIC CONTEXT IN THIRD TRIMESTER: Primary | ICD-10-CM

## 2018-07-13 DIAGNOSIS — Z91.19 NONCOMPLIANCE: ICD-10-CM

## 2018-07-13 DIAGNOSIS — Z87.59 HISTORY OF DEEP VEIN THROMBOSIS (DVT) DURING PREGNANCY: ICD-10-CM

## 2018-07-13 DIAGNOSIS — O99.213 MATERNAL MORBID OBESITY IN THIRD TRIMESTER, ANTEPARTUM (HCC): ICD-10-CM

## 2018-07-13 DIAGNOSIS — Z86.718 HISTORY OF DEEP VEIN THROMBOSIS (DVT) DURING PREGNANCY: ICD-10-CM

## 2018-07-13 DIAGNOSIS — O34.219 HISTORY OF CESAREAN DELIVERY AFFECTING PREGNANCY: ICD-10-CM

## 2018-07-13 PROBLEM — Z3A.32 32 WEEKS GESTATION OF PREGNANCY: Status: ACTIVE | Noted: 2017-06-24

## 2018-07-13 PROCEDURE — 76816 OB US FOLLOW-UP PER FETUS: CPT | Performed by: OBSTETRICS & GYNECOLOGY

## 2018-07-13 PROCEDURE — 99214 OFFICE O/P EST MOD 30 MIN: CPT | Performed by: OBSTETRICS & GYNECOLOGY

## 2018-07-14 NOTE — PROGRESS NOTES
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  7  Hx of 7 miscarriages per patient   8  Hx of a DVT but stopped taking lovenox as recommended  9  I could not see that she completed a diabetes screen this pregnancy  Follow up recommended:   1  Stressed the need for twice weekly NST and weekly TANYA as her increased BMI and her chronic hypertension increase her risk for stillbirth at term  Her US was very limited today due to her weight so could not see well enough to complete a bpp  She again refused fetal testing  2   She is set up for her  on  at 39 weeks and six days but wants to change providers  She would also like to be delivered earlier in the 39th week if possible due to her increased pelvic pressure  Will send a message to her OB office nurse who schedules    3  Encouraged daily fetal testing  4  In review of her chart and problem list, she is noncompliant with many recommendations to lessen her risk of mortality and the risk of mortatility to the baby  For this reason delivery anytime after 38 weeks is a reasonable option       Maeve Figueroa MD

## 2018-07-17 ENCOUNTER — ROUTINE PRENATAL (OUTPATIENT)
Dept: OBGYN CLINIC | Age: 23
End: 2018-07-17
Payer: COMMERCIAL

## 2018-07-17 ENCOUNTER — TELEPHONE (OUTPATIENT)
Dept: OBGYN CLINIC | Facility: CLINIC | Age: 23
End: 2018-07-17

## 2018-07-17 VITALS — WEIGHT: 269 LBS | DIASTOLIC BLOOD PRESSURE: 90 MMHG | BODY MASS INDEX: 50.83 KG/M2 | SYSTOLIC BLOOD PRESSURE: 144 MMHG

## 2018-07-17 DIAGNOSIS — Z87.59 HISTORY OF DEEP VEIN THROMBOSIS (DVT) DURING PREGNANCY: ICD-10-CM

## 2018-07-17 DIAGNOSIS — O34.219 HISTORY OF CESAREAN DELIVERY AFFECTING PREGNANCY: ICD-10-CM

## 2018-07-17 DIAGNOSIS — O99.213 OBESITY AFFECTING PREGNANCY IN THIRD TRIMESTER: ICD-10-CM

## 2018-07-17 DIAGNOSIS — Z91.19 NONCOMPLIANCE: ICD-10-CM

## 2018-07-17 DIAGNOSIS — Z86.718 HISTORY OF THROMBOEMBOLISM OF VEIN: ICD-10-CM

## 2018-07-17 DIAGNOSIS — Z30.2 REQUEST FOR STERILIZATION: ICD-10-CM

## 2018-07-17 DIAGNOSIS — Z86.718 HISTORY OF DEEP VEIN THROMBOSIS (DVT) DURING PREGNANCY: ICD-10-CM

## 2018-07-17 DIAGNOSIS — O10.913 CHRONIC HYPERTENSION IN OBSTETRIC CONTEXT IN THIRD TRIMESTER: ICD-10-CM

## 2018-07-17 DIAGNOSIS — Z34.83 ENCOUNTER FOR SUPERVISION OF OTHER NORMAL PREGNANCY IN THIRD TRIMESTER: Primary | ICD-10-CM

## 2018-07-17 PROCEDURE — 99213 OFFICE O/P EST LOW 20 MIN: CPT | Performed by: NURSE PRACTITIONER

## 2018-07-17 NOTE — PATIENT INSTRUCTIONS
Pregnancy at 28 to 38 Weeks   AMBULATORY CARE:   What changes are happening to your body: You are considered full term at the beginning of 37 weeks  Your breathing may be easier if your baby has moved down into a head-down position  You may need to urinate more often because the baby may be pressing on your bladder  You may also feel more discomfort and get tired easily  Seek care immediately if:   · You develop a severe headache that does not go away  · You have new or increased vision changes, such as blurred or spotted vision  · You have new or increased swelling in your face or hands  · You have vaginal spotting or bleeding  · Your water broke or you feel warm water gushing or trickling from your vagina  Contact your healthcare provider if:   · You have more than 5 contractions in 1 hour  · You notice any changes in your baby's movements  · You have abdominal cramps, pressure, or tightening  · You have a change in vaginal discharge  · You have chills or a fever  · You have vaginal itching, burning, or pain  · You have yellow, green, white, or foul-smelling vaginal discharge  · You have pain or burning when you urinate, less urine than usual, or pink or bloody urine  · You have questions or concerns about your condition or care  How to care for yourself at this stage of your pregnancy:   · Eat a variety of healthy foods  Healthy foods include fruits, vegetables, whole-grain breads, low-fat dairy foods, beans, lean meats, and fish  Drink liquids as directed  Ask how much liquid to drink each day and which liquids are best for you  Limit caffeine to less than 200 milligrams each day  Limit your intake of fish to 2 servings each week  Choose fish low in mercury such as canned light tuna, shrimp, crab, salmon, cod, or tilapia  Do not  eat fish high in mercury such as swordfish, tilefish, rasheed mackerel, and shark  · Take prenatal vitamins as directed    Your need for certain vitamins and minerals, such as folic acid, increases during pregnancy  Prenatal vitamins provide some of the extra vitamins and minerals you need  Prenatal vitamins may also help to decrease the risk of certain birth defects  · Rest as needed  Put your feet up if you have swelling in your ankles and feet  · Do not smoke  If you smoke, it is never too late to quit  Smoking increases your risk of a miscarriage and other health problems during your pregnancy  Smoking can cause your baby to be born too early or weigh less at birth  Ask your healthcare provider for information if you need help quitting  · Do not drink alcohol  Alcohol passes from your body to your baby through the placenta  It can affect your baby's brain development and cause fetal alcohol syndrome (FAS)  FAS is a group of conditions that causes mental, behavior, and growth problems  · Talk to your healthcare provider before you take any medicines  Many medicines may harm your baby if you take them when you are pregnant  Do not take any medicines, vitamins, herbs, or supplements without first talking to your healthcare provider  Never use illegal or street drugs (such as marijuana or cocaine) while you are pregnant  · Talk to your healthcare provider before you travel  You may not be able to travel in an airplane after 36 weeks  He may also recommend that you avoid long road trips  Safety tips during pregnancy:   · Avoid hot tubs and saunas  Do not use a hot tub or sauna while you are pregnant, especially during your first trimester  Hot tubs and saunas may raise your baby's temperature and increase the risk of birth defects  · Avoid toxoplasmosis  This is an infection caused by eating raw meat or being around infected cat feces  It can cause birth defects, miscarriages, and other problems  Wash your hands after you touch raw meat  Make sure any meat is well-cooked before you eat it   Avoid raw eggs and unpasteurized milk  Use gloves or ask someone else to clean your cat's litter box while you are pregnant  · Ask your healthcare provider about travel  The most comfortable time to travel is during the second trimester  Ask your healthcare provider if you can travel after 36 weeks  You may not be able to travel in an airplane after 36 weeks  He may also recommend that you avoid long road trips  Changes that are happening with your baby:  By 38 weeks, your baby may weigh between 6 and 9 pounds  Your baby may be about 14 inches long from the top of the head to the rump (baby's bottom)  Your baby hears well enough to know your voice  As your baby gets larger, you may feel fewer kicks and more stretching and rolling  Your baby may move into a head-down position  Your baby will also rest lower in your abdomen  What you need to know about prenatal care: Your healthcare provider will check your blood pressure and weight  You may also need the following:  · A urine test  may also be done to check for sugar and protein  These can be signs of gestational diabetes or infection  Protein in your urine may also be a sign of preeclampsia  Preeclampsia is a condition that can develop during week 20 or later of your pregnancy  It causes high blood pressure, and it can cause problems with your kidneys and other organs  · A blood test  may be done to check for anemia (low iron level)  · A Tdap vaccine  may be recommended by your healthcare provider  · A group B strep test  is a test that is done to check for group B strep infection  Group B strep is a type of bacteria that may be found in the vagina or rectum  It can be passed to your baby during delivery if you have it  Your healthcare provider will take swab your vagina or rectum and send the sample to the lab for tests  · Fundal height  is a measurement of your uterus to check your baby's growth   This number is usually the same as the number of weeks that you have been pregnant  Your healthcare provider may also check your baby's position  · Your baby's heart rate  will be checked  © 2017 [employer] Information is for End User's use only and may not be sold, redistributed or otherwise used for commercial purposes  All illustrations and images included in CareNotes® are the copyrighted property of [employer]  or [employer]  The above information is an  only  It is not intended as medical advice for individual conditions or treatments  Talk to your doctor, nurse or pharmacist before following any medical regimen to see if it is safe and effective for you

## 2018-07-17 NOTE — TELEPHONE ENCOUNTER
Per Dr Smith Levels,  has been rescheduled to  at 10 am with Dr Coy Smith  This is per [facility] that it be moved up  I left a voice mail for patient to call me  I have called both numbers we have for her

## 2018-07-17 NOTE — PROGRESS NOTES
Problem List Items Addressed This Visit     History of thromboembolism of vein    History of  delivery affecting pregnancy    History of deep vein thrombosis (DVT) during pregnancy    Request for sterilization    Chronic hypertension in obstetric context in third trimester      Other Visit Diagnoses     Encounter for supervision of other normal pregnancy in third trimester    -  Primary    Obesity affecting pregnancy in third trimester        Noncompliance            Here with FOB and mom  Feels well  Denies LOF/VB  /BTL moved up to   per [facility] recommendation  Hibiclens and preop instructions given last week  Discussed fetal kick counting  No further concerns  Multiple issues with noncompliance noted

## 2018-07-17 NOTE — TELEPHONE ENCOUNTER
I spoke with patient, I told her three times her  is for  at 10 am with Dr Tanja Mosquera  It sounded as though I woke her   She said OK

## 2018-07-19 ENCOUNTER — ANESTHESIA EVENT (OUTPATIENT)
Dept: LABOR AND DELIVERY | Facility: HOSPITAL | Age: 23
DRG: 540 | End: 2018-07-19
Payer: COMMERCIAL

## 2018-07-19 NOTE — H&P
H&P Exam - Obstetrics   Attila Frey 25 y o  female MRN: [id number]  Unit/Bed#:  Encounter: [id number]    Assessment/Plan     Assessment:  Thirty-eight weeks pregnancy with 3 previous C-sections, requests sterilization  Plan:  Repeat  and bilateral salpingectomy    History of Present Illness   Chief Complaint:  with tubal sterilization    HPI:  Attila Frey is a 25 y o  -2-7-4 female with an AIDAN of 2018, by Ultrasound at 38w5d weeks gestation who is being admitted for  with tubal sterilization  Her current obstetrical history is significant for increased BMI, chronic hypertension, 3 previous , twin gestation, IUGR, 27 week  section, 7 miscarriages  Contractions: None  Leakage of fluid: None  Bleeding: None  Fetal movement: present  Pregnancy complications: Increased BMI, chronic hypertension,     Poor compliance including no completion of diabetic screening    Review of Systems    Historical Information   OB History    Para Term  AB Living   11 3 1 2 7 4   SAB TAB Ectopic Multiple Live Births   7     1 4      # Outcome Date GA Lbr Zan/2nd Weight Sex Delivery Anes PTL Lv   11 Current            10A  17 30w2d  990 g (2 lb 2 9 oz) M CS-LTranv Gen N NADER   10B  17 30w2d   F CS-LTranv Gen N NADER   9 Term 16 39w6d  3033 g (6 lb 11 oz) M CS-LTranv  N NADER   8  07/31/15 27w0d  1531 g (3 lb 6 oz) F CS-LTranv  N NADER      Complications: IUGR (intrauterine growth restriction) affecting care of mother   7 SAB            6 SAB            5 SAB            4 SAB            3 SAB            2 SAB            1 SAB                 Baby complications/comments:  Past Medical History:   Diagnosis Date    Anxiety     takes Zoloft; stopped during pregnancy     Bipolar affective disorder ([facility] [address] ) 2017    Cardiac asystole (HCC)     Chronic hypertension with superimposed preeclampsia 2017    Depression     DVT (deep venous thrombosis) ([facility] [address] ) 2016    LLE-post cholecystectomy    Intrauterine growth restriction affecting care of mother, antepartum, third trimester, fetus 1     Migraine     Multiple gestation     Right hemiparesis ([facility] [address] )     stroke in utero    [facility]     in 2016    Severe preeclampsia, third trimester     Spontaneous      seven times    Varicella     in childhood      Past Surgical History:   Procedure Laterality Date    CHOLECYSTECTOMY      OR  DELIVERY ONLY N/A 2017 daughter 32 weeks, 2016 son, 2017 twin son and daughter, 31 weeks     Social History   History   Alcohol Use No     History   Drug Use No     History   Smoking Status    Never Smoker   Smokeless Tobacco    Never Used     Family History: non-contributory    Meds/Allergies   all medications and allergies reviewed  Allergies   Allergen Reactions    Bee Venom Anaphylaxis    Other Anaphylaxis and Swelling     Honey     Penicillins Anaphylaxis and Swelling    Flu Virus Vaccine Rash       Objective   Vitals:     Invasive Devices          No matching active lines, drains, or airways          Physical Exam   Constitutional: She is oriented to person, place, and time  She appears well-developed  Morbidly obese   HENT:   Head: Normocephalic  Cardiovascular: Normal rate, regular rhythm and normal heart sounds  Exam reveals no gallop and no friction rub  No murmur heard  Pulmonary/Chest: Breath sounds normal  She has no wheezes  She has no rales  She exhibits no tenderness  Abdominal: Bowel sounds are normal  There is no tenderness  Term size gravid uterus for which presentation normal fetal heart tones no contractions   Genitourinary: Vagina normal    Musculoskeletal: She exhibits no edema  Lymphadenopathy:     She has no cervical adenopathy  Neurological: She is oriented to person, place, and time  Skin: Skin is warm and dry  Prenatal Labs:  I have personally reviewed pertinent reports  Imaging, EKG, Pathology, and Other Studies: I have personally reviewed pertinent reports

## 2018-07-20 ENCOUNTER — ANESTHESIA (OUTPATIENT)
Dept: LABOR AND DELIVERY | Facility: HOSPITAL | Age: 23
DRG: 540 | End: 2018-07-20
Payer: COMMERCIAL

## 2018-07-20 ENCOUNTER — HOSPITAL ENCOUNTER (INPATIENT)
Facility: HOSPITAL | Age: 23
LOS: 2 days | Discharge: HOME | DRG: 540 | End: 2018-07-22
Attending: OBSTETRICS & GYNECOLOGY | Admitting: OBSTETRICS & GYNECOLOGY
Payer: COMMERCIAL

## 2018-07-20 DIAGNOSIS — O34.219 PREVIOUS CESAREAN SECTION COMPLICATING PREGNANCY: ICD-10-CM

## 2018-07-20 PROBLEM — Z98.891 S/P REPEAT LOW TRANSVERSE C-SECTION: Status: ACTIVE | Noted: 2018-07-20

## 2018-07-20 PROBLEM — O10.919 CHRONIC HYPERTENSION AFFECTING PREGNANCY: Status: RESOLVED | Noted: 2018-03-28 | Resolved: 2018-07-20

## 2018-07-20 PROBLEM — Z98.51 HISTORY OF BILATERAL TUBAL LIGATION: Status: ACTIVE | Noted: 2018-07-20

## 2018-07-20 PROBLEM — O09.30 LATE PRENATAL CARE AFFECTING PREGNANCY: Status: RESOLVED | Noted: 2017-06-24 | Resolved: 2018-07-20

## 2018-07-20 PROBLEM — Z3A.37 37 WEEKS GESTATION OF PREGNANCY: Status: RESOLVED | Noted: 2017-06-24 | Resolved: 2018-07-20

## 2018-07-20 LAB
ABO GROUP BLD: NORMAL
AMPHETAMINES SERPL QL SCN: NEGATIVE
BARBITURATES UR QL: NEGATIVE
BASE EXCESS BLDCOA CALC-SCNC: -0.9 MMOL/L (ref 3–11)
BASE EXCESS BLDCOV CALC-SCNC: -1.8 MMOL/L (ref 1–9)
BASOPHILS # BLD AUTO: 0.02 THOUSANDS/ΜL (ref 0–0.1)
BASOPHILS NFR BLD AUTO: 0 % (ref 0–1)
BENZODIAZ UR QL: NEGATIVE
BLD GP AB SCN SERPL QL: NEGATIVE
COCAINE UR QL: NEGATIVE
EOSINOPHIL # BLD AUTO: 0.12 THOUSAND/ΜL (ref 0–0.61)
EOSINOPHIL NFR BLD AUTO: 1 % (ref 0–6)
ERYTHROCYTE [DISTWIDTH] IN BLOOD BY AUTOMATED COUNT: 16.7 % (ref 11.6–15.1)
GLUCOSE SERPL-MCNC: 93 MG/DL (ref 65–140)
HBV SURFACE AG SER QL: NORMAL
HCO3 BLDCOA-SCNC: 27.1 MMOL/L (ref 17.3–27.3)
HCO3 BLDCOV-SCNC: 25.6 MMOL/L (ref 12.2–28.6)
HCT VFR BLD AUTO: 34.1 % (ref 34.8–46.1)
HGB BLD-MCNC: 10.7 G/DL (ref 11.5–15.4)
HIV 1+2 AB+HIV1 P24 AG SERPL QL IA: NORMAL
HIV1 P24 AG SER QL: NORMAL
IMM GRANULOCYTES # BLD AUTO: 0.11 THOUSAND/UL (ref 0–0.2)
IMM GRANULOCYTES NFR BLD AUTO: 1 % (ref 0–2)
LYMPHOCYTES # BLD AUTO: 1.91 THOUSANDS/ΜL (ref 0.6–4.47)
LYMPHOCYTES NFR BLD AUTO: 15 % (ref 14–44)
MCH RBC QN AUTO: 23.6 PG (ref 26.8–34.3)
MCHC RBC AUTO-ENTMCNC: 31.4 G/DL (ref 31.4–37.4)
MCV RBC AUTO: 75 FL (ref 82–98)
METHADONE UR QL: NEGATIVE
MONOCYTES # BLD AUTO: 0.56 THOUSAND/ΜL (ref 0.17–1.22)
MONOCYTES NFR BLD AUTO: 5 % (ref 4–12)
NEUTROPHILS # BLD AUTO: 9.72 THOUSANDS/ΜL (ref 1.85–7.62)
NEUTS SEG NFR BLD AUTO: 78 % (ref 43–75)
NRBC BLD AUTO-RTO: 0 /100 WBCS
O2 CT VFR BLDCOA CALC: 4.7 ML/DL
OPIATES UR QL SCN: NEGATIVE
OXYHGB MFR BLDCOA: 20.8 %
OXYHGB MFR BLDCOV: 55.7 %
PCO2 BLDCOA: 58.1 MM[HG] (ref 30–60)
PCO2 BLDCOV: 53.1 MM HG (ref 27–43)
PCP UR QL: NEGATIVE
PH BLDCOA: 7.29 [PH] (ref 7.23–7.43)
PH BLDCOV: 7.3 [PH] (ref 7.19–7.49)
PLATELET # BLD AUTO: 397 THOUSANDS/UL (ref 149–390)
PMV BLD AUTO: 9.2 FL (ref 8.9–12.7)
PO2 BLDCOA: 14.4 MM HG (ref 5–25)
PO2 BLDCOV: 27.5 MM HG (ref 15–45)
RBC # BLD AUTO: 4.53 MILLION/UL (ref 3.81–5.12)
RH BLD: POSITIVE
RPR SER QL: NORMAL
RUBV IGG SERPL IA-ACNC: 11.2 IU/ML
SAO2 % BLDCOV: 13.1 ML/DL
SPECIMEN EXPIRATION DATE: NORMAL
THC UR QL: NEGATIVE
WBC # BLD AUTO: 12.44 THOUSAND/UL (ref 4.31–10.16)

## 2018-07-20 PROCEDURE — 86762 RUBELLA ANTIBODY: CPT | Performed by: OBSTETRICS & GYNECOLOGY

## 2018-07-20 PROCEDURE — 88305 TISSUE EXAM BY PATHOLOGIST: CPT | Performed by: PATHOLOGY

## 2018-07-20 PROCEDURE — 0UB70ZZ EXCISION OF BILATERAL FALLOPIAN TUBES, OPEN APPROACH: ICD-10-PCS | Performed by: OBSTETRICS & GYNECOLOGY

## 2018-07-20 PROCEDURE — 59514 CESAREAN DELIVERY ONLY: CPT | Performed by: OBSTETRICS & GYNECOLOGY

## 2018-07-20 PROCEDURE — 85025 COMPLETE CBC W/AUTO DIFF WBC: CPT | Performed by: OBSTETRICS & GYNECOLOGY

## 2018-07-20 PROCEDURE — 82805 BLOOD GASES W/O2 SATURATION: CPT | Performed by: OBSTETRICS & GYNECOLOGY

## 2018-07-20 PROCEDURE — 86901 BLOOD TYPING SEROLOGIC RH(D): CPT | Performed by: OBSTETRICS & GYNECOLOGY

## 2018-07-20 PROCEDURE — 86850 RBC ANTIBODY SCREEN: CPT | Performed by: OBSTETRICS & GYNECOLOGY

## 2018-07-20 PROCEDURE — 10907ZC DRAINAGE OF AMNIOTIC FLUID, THERAPEUTIC FROM PRODUCTS OF CONCEPTION, VIA NATURAL OR ARTIFICIAL OPENING: ICD-10-PCS | Performed by: OBSTETRICS & GYNECOLOGY

## 2018-07-20 PROCEDURE — 86592 SYPHILIS TEST NON-TREP QUAL: CPT | Performed by: OBSTETRICS & GYNECOLOGY

## 2018-07-20 PROCEDURE — 88302 TISSUE EXAM BY PATHOLOGIST: CPT | Performed by: PATHOLOGY

## 2018-07-20 PROCEDURE — 87340 HEPATITIS B SURFACE AG IA: CPT | Performed by: OBSTETRICS & GYNECOLOGY

## 2018-07-20 PROCEDURE — 87806 HIV AG W/HIV1&2 ANTB W/OPTIC: CPT | Performed by: OBSTETRICS & GYNECOLOGY

## 2018-07-20 PROCEDURE — 80307 DRUG TEST PRSMV CHEM ANLYZR: CPT | Performed by: OBSTETRICS & GYNECOLOGY

## 2018-07-20 PROCEDURE — 86900 BLOOD TYPING SEROLOGIC ABO: CPT | Performed by: OBSTETRICS & GYNECOLOGY

## 2018-07-20 PROCEDURE — 82948 REAGENT STRIP/BLOOD GLUCOSE: CPT

## 2018-07-20 PROCEDURE — 58611 LIGATE OVIDUCT(S) ADD-ON: CPT | Performed by: OBSTETRICS & GYNECOLOGY

## 2018-07-20 PROCEDURE — 4A1HXCZ MONITORING OF PRODUCTS OF CONCEPTION, CARDIAC RATE, EXTERNAL APPROACH: ICD-10-PCS | Performed by: OBSTETRICS & GYNECOLOGY

## 2018-07-20 RX ORDER — SODIUM CHLORIDE, SODIUM LACTATE, POTASSIUM CHLORIDE, CALCIUM CHLORIDE 600; 310; 30; 20 MG/100ML; MG/100ML; MG/100ML; MG/100ML
100 INJECTION, SOLUTION INTRAVENOUS CONTINUOUS
Status: DISCONTINUED | OUTPATIENT
Start: 2018-07-20 | End: 2018-07-22 | Stop reason: HOSPADM

## 2018-07-20 RX ORDER — ONDANSETRON 2 MG/ML
4 INJECTION INTRAMUSCULAR; INTRAVENOUS EVERY 8 HOURS PRN
Status: DISCONTINUED | OUTPATIENT
Start: 2018-07-20 | End: 2018-07-22 | Stop reason: HOSPADM

## 2018-07-20 RX ORDER — ACETAMINOPHEN 325 MG/1
650 TABLET ORAL EVERY 4 HOURS PRN
Status: DISCONTINUED | OUTPATIENT
Start: 2018-07-20 | End: 2018-07-22 | Stop reason: HOSPADM

## 2018-07-20 RX ORDER — HYDRALAZINE HYDROCHLORIDE 20 MG/ML
10 INJECTION INTRAMUSCULAR; INTRAVENOUS
Status: DISCONTINUED | OUTPATIENT
Start: 2018-07-20 | End: 2018-07-20

## 2018-07-20 RX ORDER — OXYTOCIN/RINGER'S LACTATE 30/500 ML
62.5 PLASTIC BAG, INJECTION (ML) INTRAVENOUS ONCE
Status: COMPLETED | OUTPATIENT
Start: 2018-07-20 | End: 2018-07-20

## 2018-07-20 RX ORDER — OXYTOCIN/RINGER'S LACTATE 30/500 ML
PLASTIC BAG, INJECTION (ML) INTRAVENOUS
Status: COMPLETED
Start: 2018-07-20 | End: 2018-07-20

## 2018-07-20 RX ORDER — ONDANSETRON 2 MG/ML
4 INJECTION INTRAMUSCULAR; INTRAVENOUS ONCE AS NEEDED
Status: DISCONTINUED | OUTPATIENT
Start: 2018-07-20 | End: 2018-07-22 | Stop reason: HOSPADM

## 2018-07-20 RX ORDER — CALCIUM CARBONATE 200(500)MG
1000 TABLET,CHEWABLE ORAL DAILY PRN
Status: DISCONTINUED | OUTPATIENT
Start: 2018-07-20 | End: 2018-07-22 | Stop reason: HOSPADM

## 2018-07-20 RX ORDER — PROMETHAZINE HYDROCHLORIDE 25 MG/ML
12.5 INJECTION, SOLUTION INTRAMUSCULAR; INTRAVENOUS ONCE AS NEEDED
Status: DISCONTINUED | OUTPATIENT
Start: 2018-07-20 | End: 2018-07-22 | Stop reason: HOSPADM

## 2018-07-20 RX ORDER — PROPOFOL 10 MG/ML
INJECTION, EMULSION INTRAVENOUS AS NEEDED
Status: DISCONTINUED | OUTPATIENT
Start: 2018-07-20 | End: 2018-07-20 | Stop reason: SURG

## 2018-07-20 RX ORDER — FENTANYL CITRATE 50 UG/ML
INJECTION, SOLUTION INTRAMUSCULAR; INTRAVENOUS AS NEEDED
Status: DISCONTINUED | OUTPATIENT
Start: 2018-07-20 | End: 2018-07-20 | Stop reason: SURG

## 2018-07-20 RX ORDER — IBUPROFEN 600 MG/1
600 TABLET ORAL EVERY 4 HOURS PRN
Status: DISCONTINUED | OUTPATIENT
Start: 2018-07-20 | End: 2018-07-22 | Stop reason: HOSPADM

## 2018-07-20 RX ORDER — SODIUM CHLORIDE, SODIUM LACTATE, POTASSIUM CHLORIDE, CALCIUM CHLORIDE 600; 310; 30; 20 MG/100ML; MG/100ML; MG/100ML; MG/100ML
125 INJECTION, SOLUTION INTRAVENOUS CONTINUOUS
Status: DISCONTINUED | OUTPATIENT
Start: 2018-07-20 | End: 2018-07-22 | Stop reason: HOSPADM

## 2018-07-20 RX ORDER — OXYCODONE HYDROCHLORIDE AND ACETAMINOPHEN 5; 325 MG/1; MG/1
1 TABLET ORAL EVERY 4 HOURS PRN
Status: DISCONTINUED | OUTPATIENT
Start: 2018-07-21 | End: 2018-07-22 | Stop reason: HOSPADM

## 2018-07-20 RX ORDER — HYDRALAZINE HYDROCHLORIDE 20 MG/ML
10 INJECTION INTRAMUSCULAR; INTRAVENOUS
Status: DISCONTINUED | OUTPATIENT
Start: 2018-07-20 | End: 2018-07-22 | Stop reason: HOSPADM

## 2018-07-20 RX ORDER — SODIUM CHLORIDE, SODIUM LACTATE, POTASSIUM CHLORIDE, CALCIUM CHLORIDE 600; 310; 30; 20 MG/100ML; MG/100ML; MG/100ML; MG/100ML
125 INJECTION, SOLUTION INTRAVENOUS CONTINUOUS
Status: DISCONTINUED | OUTPATIENT
Start: 2018-07-20 | End: 2018-07-20

## 2018-07-20 RX ORDER — CLINDAMYCIN PHOSPHATE 900 MG/50ML
900 INJECTION INTRAVENOUS ONCE
Status: COMPLETED | OUTPATIENT
Start: 2018-07-20 | End: 2018-07-20

## 2018-07-20 RX ORDER — ONDANSETRON 2 MG/ML
4 INJECTION INTRAMUSCULAR; INTRAVENOUS ONCE AS NEEDED
Status: COMPLETED | OUTPATIENT
Start: 2018-07-20 | End: 2018-07-20

## 2018-07-20 RX ORDER — LABETALOL HYDROCHLORIDE 5 MG/ML
10 INJECTION, SOLUTION INTRAVENOUS
Status: DISCONTINUED | OUTPATIENT
Start: 2018-07-20 | End: 2018-07-22 | Stop reason: HOSPADM

## 2018-07-20 RX ORDER — PROMETHAZINE HYDROCHLORIDE 25 MG/ML
12.5 INJECTION, SOLUTION INTRAMUSCULAR; INTRAVENOUS ONCE AS NEEDED
Status: DISCONTINUED | OUTPATIENT
Start: 2018-07-20 | End: 2018-07-20

## 2018-07-20 RX ORDER — DOCUSATE SODIUM 100 MG/1
100 CAPSULE, LIQUID FILLED ORAL 2 TIMES DAILY
Status: DISCONTINUED | OUTPATIENT
Start: 2018-07-20 | End: 2018-07-22 | Stop reason: HOSPADM

## 2018-07-20 RX ORDER — DEXAMETHASONE SODIUM PHOSPHATE 4 MG/ML
INJECTION, SOLUTION INTRA-ARTICULAR; INTRALESIONAL; INTRAMUSCULAR; INTRAVENOUS; SOFT TISSUE AS NEEDED
Status: DISCONTINUED | OUTPATIENT
Start: 2018-07-20 | End: 2018-07-20 | Stop reason: SURG

## 2018-07-20 RX ORDER — SUCCINYLCHOLINE CHLORIDE 20 MG/ML
INJECTION INTRAMUSCULAR; INTRAVENOUS AS NEEDED
Status: DISCONTINUED | OUTPATIENT
Start: 2018-07-20 | End: 2018-07-20 | Stop reason: SURG

## 2018-07-20 RX ORDER — ONDANSETRON 2 MG/ML
INJECTION INTRAMUSCULAR; INTRAVENOUS AS NEEDED
Status: DISCONTINUED | OUTPATIENT
Start: 2018-07-20 | End: 2018-07-20 | Stop reason: SURG

## 2018-07-20 RX ORDER — OXYTOCIN/RINGER'S LACTATE 30/500 ML
PLASTIC BAG, INJECTION (ML) INTRAVENOUS CONTINUOUS PRN
Status: DISCONTINUED | OUTPATIENT
Start: 2018-07-20 | End: 2018-07-20 | Stop reason: SURG

## 2018-07-20 RX ORDER — METOCLOPRAMIDE HYDROCHLORIDE 5 MG/ML
10 INJECTION INTRAMUSCULAR; INTRAVENOUS ONCE AS NEEDED
Status: DISCONTINUED | OUTPATIENT
Start: 2018-07-20 | End: 2018-07-22 | Stop reason: HOSPADM

## 2018-07-20 RX ORDER — FENTANYL CITRATE/PF 50 MCG/ML
25 SYRINGE (ML) INJECTION
Status: DISCONTINUED | OUTPATIENT
Start: 2018-07-20 | End: 2018-07-20

## 2018-07-20 RX ORDER — ONDANSETRON 2 MG/ML
INJECTION INTRAMUSCULAR; INTRAVENOUS
Status: COMPLETED
Start: 2018-07-20 | End: 2018-07-20

## 2018-07-20 RX ORDER — METOCLOPRAMIDE HYDROCHLORIDE 5 MG/ML
10 INJECTION INTRAMUSCULAR; INTRAVENOUS ONCE AS NEEDED
Status: DISCONTINUED | OUTPATIENT
Start: 2018-07-20 | End: 2018-07-20

## 2018-07-20 RX ORDER — DIAPER,BRIEF,INFANT-TODD,DISP
EACH MISCELLANEOUS 4 TIMES DAILY PRN
Status: DISCONTINUED | OUTPATIENT
Start: 2018-07-20 | End: 2018-07-22 | Stop reason: HOSPADM

## 2018-07-20 RX ORDER — OXYCODONE HYDROCHLORIDE AND ACETAMINOPHEN 5; 325 MG/1; MG/1
2 TABLET ORAL EVERY 4 HOURS PRN
Status: DISCONTINUED | OUTPATIENT
Start: 2018-07-21 | End: 2018-07-22 | Stop reason: HOSPADM

## 2018-07-20 RX ORDER — FENTANYL CITRATE/PF 50 MCG/ML
25 SYRINGE (ML) INJECTION
Status: DISCONTINUED | OUTPATIENT
Start: 2018-07-20 | End: 2018-07-22 | Stop reason: HOSPADM

## 2018-07-20 RX ORDER — NIFEDIPINE 30 MG/1
30 TABLET, EXTENDED RELEASE ORAL DAILY
Status: DISCONTINUED | OUTPATIENT
Start: 2018-07-20 | End: 2018-07-22 | Stop reason: HOSPADM

## 2018-07-20 RX ORDER — LABETALOL HYDROCHLORIDE 5 MG/ML
10 INJECTION, SOLUTION INTRAVENOUS
Status: DISCONTINUED | OUTPATIENT
Start: 2018-07-20 | End: 2018-07-20

## 2018-07-20 RX ORDER — MORPHINE SULFATE 0.5 MG/ML
INJECTION, SOLUTION EPIDURAL; INTRATHECAL; INTRAVENOUS AS NEEDED
Status: DISCONTINUED | OUTPATIENT
Start: 2018-07-20 | End: 2018-07-20 | Stop reason: SURG

## 2018-07-20 RX ADMIN — HYDROMORPHONE HYDROCHLORIDE: 10 INJECTION, SOLUTION INTRAMUSCULAR; INTRAVENOUS; SUBCUTANEOUS at 15:20

## 2018-07-20 RX ADMIN — SODIUM CHLORIDE, SODIUM LACTATE, POTASSIUM CHLORIDE, AND CALCIUM CHLORIDE: .6; .31; .03; .02 INJECTION, SOLUTION INTRAVENOUS at 11:22

## 2018-07-20 RX ADMIN — FENTANYL CITRATE 100 MCG: 50 INJECTION, SOLUTION INTRAMUSCULAR; INTRAVENOUS at 11:39

## 2018-07-20 RX ADMIN — Medication 62.5 MILLI-UNITS/MIN: at 13:30

## 2018-07-20 RX ADMIN — MORPHINE SULFATE 2 MG: 0.5 INJECTION, SOLUTION EPIDURAL; INTRATHECAL; INTRAVENOUS at 12:05

## 2018-07-20 RX ADMIN — SUCCINYLCHOLINE CHLORIDE 140 MG: 20 INJECTION, SOLUTION INTRAMUSCULAR; INTRAVENOUS at 11:26

## 2018-07-20 RX ADMIN — SODIUM CHLORIDE, SODIUM LACTATE, POTASSIUM CHLORIDE, AND CALCIUM CHLORIDE 1000 ML: .6; .31; .03; .02 INJECTION, SOLUTION INTRAVENOUS at 09:14

## 2018-07-20 RX ADMIN — CLINDAMYCIN PHOSPHATE 900 MG: 18 INJECTION, SOLUTION INTRAMUSCULAR; INTRAVENOUS at 10:47

## 2018-07-20 RX ADMIN — MORPHINE SULFATE 1 MG: 0.5 INJECTION, SOLUTION EPIDURAL; INTRATHECAL; INTRAVENOUS at 11:46

## 2018-07-20 RX ADMIN — SODIUM CHLORIDE, SODIUM LACTATE, POTASSIUM CHLORIDE, AND CALCIUM CHLORIDE 125 ML/HR: .6; .31; .03; .02 INJECTION, SOLUTION INTRAVENOUS at 09:31

## 2018-07-20 RX ADMIN — FENTANYL CITRATE 25 MCG: 50 INJECTION INTRAMUSCULAR; INTRAVENOUS at 13:15

## 2018-07-20 RX ADMIN — FENTANYL CITRATE 25 MCG: 50 INJECTION INTRAMUSCULAR; INTRAVENOUS at 13:20

## 2018-07-20 RX ADMIN — ONDANSETRON 4 MG: 2 INJECTION INTRAMUSCULAR; INTRAVENOUS at 13:00

## 2018-07-20 RX ADMIN — ONDANSETRON 4 MG: 2 INJECTION INTRAMUSCULAR; INTRAVENOUS at 11:37

## 2018-07-20 RX ADMIN — GENTAMICIN SULFATE 40 MG: 40 INJECTION, SOLUTION INTRAMUSCULAR; INTRAVENOUS at 11:10

## 2018-07-20 RX ADMIN — FENTANYL CITRATE 25 MCG: 50 INJECTION INTRAMUSCULAR; INTRAVENOUS at 13:43

## 2018-07-20 RX ADMIN — ONDANSETRON 4 MG: 2 INJECTION, SOLUTION INTRAMUSCULAR; INTRAVENOUS at 13:00

## 2018-07-20 RX ADMIN — PROPOFOL 200 MG: 10 INJECTION, EMULSION INTRAVENOUS at 11:26

## 2018-07-20 RX ADMIN — DOCUSATE SODIUM 100 MG: 100 CAPSULE, LIQUID FILLED ORAL at 17:03

## 2018-07-20 RX ADMIN — Medication 250 MILLI-UNITS/MIN: at 11:36

## 2018-07-20 RX ADMIN — FENTANYL CITRATE 25 MCG: 50 INJECTION INTRAMUSCULAR; INTRAVENOUS at 14:50

## 2018-07-20 RX ADMIN — MORPHINE SULFATE 2 MG: 0.5 INJECTION, SOLUTION EPIDURAL; INTRATHECAL; INTRAVENOUS at 11:54

## 2018-07-20 RX ADMIN — DEXAMETHASONE SODIUM PHOSPHATE 8 MG: 4 INJECTION, SOLUTION INTRAMUSCULAR; INTRAVENOUS at 11:37

## 2018-07-20 NOTE — PLAN OF CARE
DISCHARGE PLANNING     Discharge to home or other facility with appropriate resources Progressing        INFECTION - ADULT     Absence or prevention of progression during hospitalization Progressing     Absence of fever/infection during neutropenic period Progressing        Knowledge Deficit     Patient/family/caregiver demonstrates understanding of disease process, treatment plan, medications, and discharge instructions Progressing        PAIN - ADULT     Verbalizes/displays adequate comfort level or baseline comfort level Progressing        POSTPARTUM     Experiences normal postpartum course Progressing     Appropriate maternal -  bonding Progressing     Establishment of infant feeding pattern Progressing     Incision(s), wounds(s) or drain site(s) healing without S/S of infection Progressing        Potential for Falls     Patient will remain free of falls Progressing        SAFETY ADULT     Maintain or return to baseline ADL function Progressing     Maintain or return mobility status to optimal level Progressing

## 2018-07-20 NOTE — SOCIAL WORK
As per discussion with charge RN Deja Mai, she was in contact with [facility] worker Sandoval Law today regarding possible d/c of baby with identified grandmother  Per Deja Mai, C&Y is aware of pt plan for baby d/c with grandmother and C&Y will complete assessment and advise hospital if/when baby is socially cleared for d/c with grandmother  Awaiting update from C&Y

## 2018-07-20 NOTE — DISCHARGE SUMMARY
Discharge Summary - Marsha Ramey 25 y o  female MRN: [id number]    Unit/Bed#: LD PACU-02 Encounter: [id number]    Admission Date: 2018     Discharge Date: 2018    Discharge Attending: Dr Ben Mcadams    Diagnosis:  Pregnancy at 38w6d  Chronic hypertension  BMI 50  History of DVT in prior pregnancy  History of in utero stroke with right hemiparesis  Anxiety  Bipolar disorder    Procedures: Repeat low transverse  section with bilateral salpingectomy    Complications: none apparent     Pt is a 98AB [id number] who was admitted and underwent an uncomplicated repeat low transverse  section and bilateral salpingectomy at 38w6d  She delivered a viable male  at 46 on 18  Weight was 7lbs 2 3oz (3240g) with APGARs of 9 and 10 at 1 and 5 minutes  Her preoperative Hb was 10 7  Her postoperative Hb was 8 5  Her postoperative course was uncomplicated  She was started on Lovenox 40mg bid for VTE prophylaxis  She will be discharged home with 6 weeks of Xarelto  She was continued on procardia for history of chronic hypertension  Case management was also consulted regarding custody of her child  Condition at discharge: stable     On day of discharge pain was well controlled, patient was tolerating PO, passing flatus, has not had a bowel movement  She was discharged with standard post partum/post operative instructions to follow up with her physician in 1 week for an incision check and in 3-6 weeks for a postpartum appointment  Discharge instructions/Information to patient and family:   -Do not place anything (no partner, tampons or douche) in your vagina for 6 weeks    -You may walk for exercise for the first 6 weeks then gradually return to your usual activities    -Please do not drive for 1 week if you have no stitches and for 2 weeks if you have stitches or underwent a  delivery     -You may take baths or shower per your preference    -Please look at your bust (breasts) in the mirror daily and call for redness or tenderness or increased warmth    -Please call us for temperature > 100 4*F or 38* C, worsening pain or a foul discharge       Discharge Medications:   Prenatal vitamin daily for 6 months or the duration of nursing whichever is longer  Motrin 600 mg orally every 6 hours as needed for pain  Tylenol (over the counter) per bottle directions as needed for pain: do NOT use with percocet  Hydrocortisone cream 1% (over the counter) applied 1-2x daily to hemorrhoids as needed  Percocet as needed    Provisions for Follow-Up Care: Follow up with your doctor in 1 week for incision site check       Planned Readmission: Rita Ray  OB/Gyn, PGY-1  7/22/2018  10:00 AM

## 2018-07-20 NOTE — PROGRESS NOTES
I spoke to Leatha Alcaraz from C&Y she requested me to fax her a copy of the letter patient had notarized and brought with her to hospital  The letter identifies grandparent Yu Cooper as person who will have custody of the baby  I faxed the letter  Taty Fajardo said she will speak with the grandmother and let us know if the baby is cleared to go home with her

## 2018-07-20 NOTE — PROGRESS NOTES
Patient is PO day #0 following RLTCS + BTL    She is doing well  Denies nausea and vomiting  Currently ordering food for lunch  Is able to pass gas, has not had bowel movements  Has not been able to ambulate yet  Voiding adequately through nelson  Lochia WNL  Incision clean, dry, intact without signs of inflammation  Uterus at umbilicus      Primus MD Santana  7/20/2018  4:03 PM

## 2018-07-20 NOTE — ANESTHESIA PREPROCEDURE EVALUATION
Review of Systems/Medical History  Patient summary reviewed  Chart reviewed  No history of anesthetic complications     Cardiovascular  Exercise tolerance (METS): >4,  Hypertension controlled, DVT   Pulmonary  Negative pulmonary ROS        GI/Hepatic  Negative GI/hepatic ROS          Negative  ROS        Endo/Other    Obesity  super morbid obesity   GYN  Currently pregnant , Prior pregnancy/OB history : 7+ Parity: 4,          Hematology  Negative hematology ROS      Musculoskeletal  Negative musculoskeletal ROS        Neurology  Seizures ,  CVA (In utero) , residual symptoms, Headaches,    Psychology   Anxiety, Depression , bipolar disorder,              Physical Exam    Airway    Mallampati score: II  TM Distance: >3 FB  Neck ROM: full     Dental   No notable dental hx     Cardiovascular      Pulmonary      Other Findings        Anesthesia Plan  ASA Score- 3     Anesthesia Type- spinal with ASA Monitors  Additional Monitors:   Airway Plan:     Comment: SAB with duramorph for post-op analgesia  Plan Factors- Patient instructed to abstain from smoking on day of procedure  Patient did not smoke on day of surgery  Induction-     Postoperative Plan- Plan for postoperative opioid use  Informed Consent- Anesthetic plan and risks discussed with patient  I personally reviewed this patient with the CRNA  Discussed and agreed on the Anesthesia Plan with the CRNA  Frankie Ribera

## 2018-07-20 NOTE — OP NOTE
OPERATIVE REPORT  PATIENT NAME: Cristopher White    :  1995  MRN: [id number]  Pt Location: [facility] OR ROOM 02    SURGERY DATE: 2018    Surgeon(s) and Role:     * Tish Alaniz MD - Primary     * Alli Hoskins MD - Assisting    Preop Diagnosis:  Previous  section complicating pregnancy [L76 730]  Pregnancy at 38w6d  Chronic hypertension  BMI 50  History of DVT in prior pregnancy  History of in utero stroke with right hemiparesis  Anxiety  Bipolar disorder    Post-Op Diagnosis Codes:     * Previous  section complicating pregnancy [Q79 647]    Procedure(s) (LRB):   SECTION () REPEAT (N/A)  LIGATION/COAGULATION TUBAL (Bilateral)    Specimen(s):  ID Type Source Tests Collected by Time Destination   1 :  Tissue Fallopian Tube, Left TISSUE EXAM Tish Alaniz MD 2018 1149    2 :  Tissue Fallopian Tube, Right TISSUE EXAM Tish Alaniz MD 2018 1151    3 : Appendage Tissue Omentum TISSUE EXAM Tish Alaniz MD 2018 1207    A :  Cord Blood Cord BLOOD GAS, VENOUS, CORD, BLOOD GAS, ARTERIAL, CORD Tish Alaniz MD 2018 1135    B :  Tissue (Placenta on Hold) OB Only Placenta PLACENTA IN STORAGE Vasu Cordero MD 2018 1136        Estimated Blood Loss:   500 mL    Drains:  Urethral Catheter 16 Fr  (Active)   Site Assessment Clean;Skin intact 2018 11:53 AM   Collection Container Standard drainage bag 2018 11:53 AM   Number of days: 0       Anesthesia Type:   * No anesthesia type entered *    Operative Indications:  Previous  section complicating pregnancy    Complications:   None    Procedure and Technique:    Operative Findings:  1  Viable male  at 47 with APGARs of 9 and 10 at 1 and 5 minutes  Fetus weighted 7lb 2 3oz   2  Normal intact placenta with centrally inserted 3VC expressed at 1135    3  Normal uterus, bilateral tubes and ovaries    4  Blood gases:   Arterial pH: 7 287   Arterial base excess: -0 9   Venous pH: 7 301   Venous base excess: -1 8    Procedure: The patient was taken to the operating room  Gentamicin and clindamycin was given for preoperative prophylaxis  The patient was then placed in a supine position with a left lateral tilt  Mcgowan catheter was placed in sterile fashion  Fetal heart tones were noted to be normal  The patient was then prepped with chloraprep for abdominal prep and betadine for vaginal prep and draped in the usual sterile fashion for a Pfannensteil skin incision  A spinal anesthesia was unable to be obtained so general anesthesia was induced  A time out was performed to confirm correct patient and correct procedure  An incision was made in the skin with a surgical scalpel  There was fibrotic subcutaneous tissue with scarring  Sharp and blunt dissection was used to reach the level of the rectus fascia  Bovie electrocautery was utilitzed for hemostasis during this process  The fascia was incised transversely at the midline and the fascial incision was extended bilaterally using hector scissors  The superior edge of the fascial incision was grasped with Kocher clamps, tented up and the underlying rectus muscles were dissected off bluntly and sharply using the scalpel  The inferior edge of the fascial incision was then dissection off the rectus muscles with blunt and sharp dissection  The rectus muscles were then divided at the midline  The peritoneum was identified, tented up at its upper margin taking care to avoid the bladder, and then entered  The peritoneal incision and rectus muscle were extended bilaterally bluntly with gentle traction  The bladder blade was inserted  Then, a transverse incision was made in the lower uterine segment using a new surgical blade  The uterine incision was extended cephalad and caudal using blunt dissection  The amniotic sac was entered bluntly and the amniotic fluid was noted to be clear in color      The surgeon's hand was placed into the uterine cavity  The fetus was noted to be in vertex presentation, and the presenting part was grasped and delivered through the uterine incision with the assistance of fundal pressure  The infant's oral and nasal passages were bulb suctioned  After delivery the cord was doubly clamped and cut  The infant was then passed off the table to the awaiting  staff  Venous and arterial blood gas, cord blood, and portion of cord was obtained for analysis and routine blood testing  The placenta then delivered  The placenta was noted to be intact with a centrally inserted three-vessel cord  Oxytocin was administered by IV infusion to enhance uterine contraction  The uterus was exteriorized and cleared of all clots and debris by blunt curretage with a moist lap sponge  The uterine incision was reapproximated using a 0-monocryl in a running locked fashion  A second vertical imbricating stitch with 0-monocryl was applied  The uterine incision was examined and noted to be hemostatic  At this point, our attention turned to the adnexa  The bilateral Fallopian tubes were identified  First the right Fallopian tube was identified and grasped with a Laurie Ridgel in an avascular area and tented up  A hemostat was used to clamp across the mesosalpinx  A window was made in an avascular area and a second hemostat was used to clamp across the fallopian tube, close to the fundus  The fallopian tube was then removed using Metzenbaum scissors  0-vicryl suture was used to doubly ligate the mesosalpinx clamped by the hemostats  Good hemostasis was noted  The left Fallopian tube was identified and doubly ligated in the same manner  Good hemostasis was noted on this side  At this point, the posterior cul-de-sac was cleared of all clots  The uterus was replaced into the abdomen and the pericolic gutters were cleared of all clots   The uterine incision and bilateral tubal ligation sites were once again reexamined and noted to be hemostatic  The fascia was then reapproximated using 0 Vicryl in a running nonlocked fashion  The subcutaneous tissue was irrigated and cleared of all clots and debris  Good hemostasis was achieved with Bovie electrocautery  The subcutaneous tissue was reapproximated with 2-0 Plain suture  The skin incision was closed with 4-0 monocryl with histocryl  Good hemostasis was noted  All needle, sponge, and instrument counts were noted to be correct x 2 at the end of the procedure  The patient was then cleansed and transferred to the recovery room  Overall, the patient tolerated the procedure well and is currently in stable condition in the PACU with her   Dr Parris Long was present for the entire procedure        Patient Disposition:  PACU     SIGNATURE: Shira Mims MD  DATE: 2018  TIME: 1:09 PM

## 2018-07-20 NOTE — ANESTHESIA POSTPROCEDURE EVALUATION
Post-Op Assessment Note      CV Status:  Stable    Mental Status:  Awake    Hydration Status:  Stable    PONV Controlled:  None    Airway Patency:  Patent    Post Op Vitals Reviewed: Yes          Staff: Anesthesiologist           BP (!) 181/105 (07/20/18 1234)    Temp (!) 97 °F (36 1 °C) (07/20/18 1234)    Pulse 94 (07/20/18 1234)   Resp 18 (07/20/18 1234)    SpO2 100 % (07/20/18 1234)
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
## 2018-07-21 LAB
ERYTHROCYTE [DISTWIDTH] IN BLOOD BY AUTOMATED COUNT: 16.6 % (ref 11.6–15.1)
HCT VFR BLD AUTO: 27.7 % (ref 34.8–46.1)
HGB BLD-MCNC: 8.5 G/DL (ref 11.5–15.4)
MCH RBC QN AUTO: 23.6 PG (ref 26.8–34.3)
MCHC RBC AUTO-ENTMCNC: 30.7 G/DL (ref 31.4–37.4)
MCV RBC AUTO: 77 FL (ref 82–98)
PLATELET # BLD AUTO: 335 THOUSANDS/UL (ref 149–390)
PMV BLD AUTO: 9.2 FL (ref 8.9–12.7)
RBC # BLD AUTO: 3.6 MILLION/UL (ref 3.81–5.12)
WBC # BLD AUTO: 13.97 THOUSAND/UL (ref 4.31–10.16)

## 2018-07-21 PROCEDURE — 99024 POSTOP FOLLOW-UP VISIT: CPT | Performed by: OBSTETRICS & GYNECOLOGY

## 2018-07-21 PROCEDURE — 85027 COMPLETE CBC AUTOMATED: CPT | Performed by: OBSTETRICS & GYNECOLOGY

## 2018-07-21 RX ADMIN — SODIUM CHLORIDE, SODIUM LACTATE, POTASSIUM CHLORIDE, AND CALCIUM CHLORIDE 100 ML/HR: .6; .31; .03; .02 INJECTION, SOLUTION INTRAVENOUS at 00:20

## 2018-07-21 RX ADMIN — IBUPROFEN 600 MG: 600 TABLET, FILM COATED ORAL at 16:13

## 2018-07-21 RX ADMIN — NIFEDIPINE 30 MG: 30 TABLET, FILM COATED, EXTENDED RELEASE ORAL at 09:03

## 2018-07-21 RX ADMIN — ENOXAPARIN SODIUM 40 MG: 40 INJECTION SUBCUTANEOUS at 21:23

## 2018-07-21 RX ADMIN — DOCUSATE SODIUM 100 MG: 100 CAPSULE, LIQUID FILLED ORAL at 09:03

## 2018-07-21 RX ADMIN — DOCUSATE SODIUM 100 MG: 100 CAPSULE, LIQUID FILLED ORAL at 16:13

## 2018-07-21 NOTE — PROGRESS NOTES
Progress Note - OB/GYN   Kumar Black 25 y o  female MRN: [id number]  Unit/Bed#: -01 Encounter: [id number]    Assessment:  PP/POD#2 s/p Repeat low transverse  section, with B/L salpingectomies    Plan:   - Continue home Procardia 30mg daily   - Start Lovenox 40mg q12hr at 0900  - F/U CM consult: children in foster care and for Lovenox 6 weeks pp  - s/p general anesthesia: dilaudid PCA x 24 hr                 Subjective:  Patient was quiet and her partner did most of the talking     Pain: no  Tolerating PO: yes  Voiding: yes  Flatus: no  BM: no  Ambulating: yes  Breastfeeding: No   Chest pain: no  Shortness of breath: no  Leg pain: no  Lochia: minimal     Objective:     Vitals:  Vitals:    18 1815 18 2003 18 2344 18 0408   BP: 144/68 138/63 122/68 138/67   BP Location: Right arm Right arm Right arm Right arm   Pulse: 97 96 93 83   Resp:    Temp: 98 1 °F (36 7 °C) 98 4 °F (36 9 °C) 98 1 °F (36 7 °C) 98 6 °F (37 °C)   TempSrc: Oral Oral Oral Oral   SpO2: 97% 96% 99% 97%   Weight:       Height:           Physical Exam:     Physical Exam  Uterine fundus firm and non-tender, incision has sanguinous stains on the padding, but otherwise is clean, dry and intact   Extremities: SCDs in place         Lab Results   Component Value Date    WBC 13 97 (H) 2018    HGB 8 5 (L) 2018    HCT 27 7 (L) 2018    MCV 77 (L) 2018     2018       Guzman Garcia MD  18

## 2018-07-21 NOTE — PLAN OF CARE
DISCHARGE PLANNING     Discharge to home or other facility with appropriate resources Progressing        INFECTION - ADULT     Absence or prevention of progression during hospitalization Progressing     Absence of fever/infection during neutropenic period Progressing        Knowledge Deficit     Patient/family/caregiver demonstrates understanding of disease process, treatment plan, medications, and discharge instructions Progressing        PAIN - ADULT     Verbalizes/displays adequate comfort level or baseline comfort level Progressing        POSTPARTUM     Experiences normal postpartum course Progressing     Appropriate maternal -  bonding Progressing     Establishment of infant feeding pattern Progressing     Incision(s), wounds(s) or drain site(s) healing without S/S of infection Progressing        Potential for Falls     Patient will remain free of falls Progressing        Prexisting or High Potential for Compromised Skin Integrity     Skin integrity is maintained or improved Progressing        SAFETY ADULT     Maintain or return to baseline ADL function Progressing     Maintain or return mobility status to optimal level Progressing

## 2018-07-22 VITALS
SYSTOLIC BLOOD PRESSURE: 148 MMHG | HEIGHT: 61 IN | WEIGHT: 269 LBS | HEART RATE: 76 BPM | OXYGEN SATURATION: 98 % | BODY MASS INDEX: 50.79 KG/M2 | DIASTOLIC BLOOD PRESSURE: 82 MMHG | RESPIRATION RATE: 20 BRPM | TEMPERATURE: 98.9 F

## 2018-07-22 PROCEDURE — 99024 POSTOP FOLLOW-UP VISIT: CPT | Performed by: OBSTETRICS & GYNECOLOGY

## 2018-07-22 RX ADMIN — NIFEDIPINE 30 MG: 30 TABLET, FILM COATED, EXTENDED RELEASE ORAL at 08:57

## 2018-07-22 RX ADMIN — ENOXAPARIN SODIUM 40 MG: 40 INJECTION SUBCUTANEOUS at 08:56

## 2018-07-22 RX ADMIN — DOCUSATE SODIUM 100 MG: 100 CAPSULE, LIQUID FILLED ORAL at 08:57
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
## 2018-07-22 NOTE — PROGRESS NOTES
Progress Note - OB/GYN   Lety Cam 25 y o  female MRN: [id number]  Unit/Bed#:  313-01 Encounter: [id number]    Assessment:  Post partum Day #2 s/pRLTCS, stable, baby in room     Plan:  Patient would like to go home today  She is upset about the conversation regarding custody last night  She said that she could manage Lovenox at home  Her priority is leaving today  Continue routine post partum care   Encourage ambulation   Encourage breastfeeding         Subjective: Post delivery  Patient is doing well  She is upset about the conversation over custoday  Lochia WNL  Pain well controlled       Pain: yes, cramping, improved with meds  Tolerating PO: yes  Voiding: yes  Flatus: yes  BM: no  Ambulating: yes  Breastfeeding:  no  Chest pain: no  Shortness of breath: no  Leg pain: no  Lochia: minimal    Objective:     Vitals: /56 (BP Location: Left arm)   Pulse 79   Temp 98 °F (36 7 °C) (Oral)   Resp 18   Ht 5' 1" (1 549 m)   Wt 122 kg (269 lb)   SpO2 98%   BMI 50 83 kg/m²     No intake or output data in the 24 hours ending 07/22/18 0726    Lab Results   Component Value Date    WBC 13 97 (H) 07/21/2018    HGB 8 5 (L) 07/21/2018    HCT 27 7 (L) 07/21/2018    MCV 77 (L) 07/21/2018     07/21/2018       Physical Exam:     Gen: AAOx3, NAD  CV: RRR  Lungs: CTA b/l  Abd: Soft, non-tender, non-distended, no rebound or guarding, incision is clean, dry and intact, no ertythema  Uterine fundus firm and non-tender  Ext: Non tender, not wearing SCDs    Lenny Florez MD  7/22/2018  7:26 AM
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
## 2018-07-22 NOTE — DISCHARGE INSTRUCTIONS
WHAT YOU NEED TO KNOW:   A , or  section, is abdominal surgery to deliver your baby  DISCHARGE INSTRUCTIONS:   Call 911 for any of the following:   · You feel lightheaded, short of breath, and have chest pain  · You cough up blood  Seek care immediately if:   · Blood soaks through your bandage  · Your stitches come apart  · Your arm or leg feels warm, tender, and painful  It may look swollen and red  Contact your OB if:   · You have heavy vaginal bleeding that fills 1 or more sanitary pads in 1 hour  · You have a fever  · Your incision is swollen, red, or draining pus  · You have questions or concerns about yourself or your baby  Medicines: You may  need any of the following:  · Prescription pain medicine  may be given  Ask how to take this medicine safely  · Acetaminophen  decreases pain and fever  It is available without a doctor's order  Ask how much to take and how often to take it  Follow directions  Acetaminophen can cause liver damage if not taken correctly  · NSAIDs , such as ibuprofen, help decrease swelling, pain, and fever  NSAIDs can cause stomach bleeding or kidney problems in certain people  If you take blood thinner medicine, always ask your healthcare provider if NSAIDs are safe for you  Always read the medicine label and follow directions  · Take your medicine as directed  Contact your healthcare provider if you think your medicine is not helping or if you have side effects  Tell him or her if you are allergic to any medicine  Keep a list of the medicines, vitamins, and herbs you take  Include the amounts, and when and why you take them  Bring the list or the pill bottles to follow-up visits  Carry your medicine list with you in case of an emergency  Wound care:  Carefully wash your wound with soap and water every day  Keep your wound clean and dry  Wear loose, comfortable clothes that do not rub against your wound   Ask your OB about bathing and showering  Limit activity as directed:   · Ask when it is safe for you to drive, walk up stairs, lift heavy objects, and have sex  · Ask when it is okay to exercise, and what types of exercise to do  Start slowly and do more as you get stronger  Drink liquids as directed:  Liquids help keep you hydrated after your procedure and decrease your risk for a blood clot  Ask how much liquid to drink each day and which liquids are best for you  Follow up with your OB as directed: You may need to return to have your stitches or staples removed  Write down your questions so you remember to ask them during your visits  © 2017 [employer] Information is for End User's use only and may not be sold, redistributed or otherwise used for commercial purposes  All illustrations and images included in CareNotes® are the copyrighted property of [employer]  or Kareem Moss  The above information is an  only  It is not intended as medical advice for individual conditions or treatments  Talk to your doctor, nurse or pharmacist before following any medical regimen to see if it is safe and effective for you

## 2018-07-23 NOTE — CASE MANAGEMENT
Notification of Maternity Inpatient Admission/Maternity Inpatient Authorization Request  This is a Notification of Maternity Inpatient Admission/Maternity Inpatient Authorization Request to our facility [facility]  Please be advised that this patient is currently in our facility under Inpatient Status  Below you will find the Birth/ Summary, Attending Physician and Facilitys information including NPI# and contact for the Utilization  assigned to the [facility] where the patient is receiving services  Please feel free to contact the Utilization Review Department with any questions  Mothers Information:  Henny Pfeiffer  MRN: [id number]  YOB: 1995  Admission Date: 2018  8:01 AM  Discharge Date: 2018  1:20 PM  Disposition: Home/Self Care  Admitting Diagnosis: O82  DELIVERY   Information:  Estimated Date of Delivery: 18  Information for the patient's :  [place]  [facility]) [[id number]]     [place] Delivery Information:  Sex: male  Delivered 2018 11:34 AM by , Low Transverse; Gestational Age: 38w7d     Measurements:  Weight: 7 lb 2 3 oz (3240 g); Height: 19"    APGAR 1 minute 5 minutes 10 minutes   Totals: 9 10      OB History      Para Term  AB Living    11 4 2 2 7 5    SAB TAB Ectopic Multiple Live Births    7     1 5        Attending Physician:  DOUG Moreno  Specialty- Obstetrics and Gynecology  [facility] ID- [id number]  [address]  Phone 1: [phone]  Fax: [phone]    [facility])  [address]  [phone]  Tax ID: [id number]  NPI: [id number]    [address] [place] [address] in the [facility] by Kareem Moss for 2017  Network Utilization Review Department  Phone: [phone];  Fax [phone]  ATTENTION: The Network Utilization Review Department is now centralized for our 7 Facilities  Make a note that we have a new phone and fax numbers for our Department  Please call with any questions or concerns to [phone] and carefully follow the prompts so that you are directed to the right person  All voicemails are confidential  Fax any determinations, approvals, denials, and requests for initial or continue stay review clinical to [phone]  Due to HIGH CALL volume, it would be easier if you could please send faxed requests to expedite your requests and in part, help us provide discharge notifications faster

## 2018-07-31 LAB — PLACENTA IN STORAGE: NORMAL

## 2018-09-11 NOTE — ASSESSMENT & PLAN NOTE
Seen in Labor and delivery recently for abdominal pain   as per patient GBS was taken   patient would like her    section date be moved up to an earlier date   patient has normal fetal movements met pt w wife, conversation w Dr Farfan & Naina speech pathologist, explained results of MBaS.  pt to have GI consult. spoke of pt hcp, from last hospitalization EMR, confirmed wife is hcp, sons are alt hcp. Will follow up regarding SARITA after GI eval. contact # given. met pt w wife, conversation w Dr Farfan & Naina speech pathologist, explained results of MBaS.  pt to have GI consult. spoke of pt hcp, from last hospitalization EMR, confirmed wife is hcp, sons are alt hcp. Will follow up regarding SARITA after GI eval. contact # given.      9/13/18: 1140hrs: met pt, reviewed results of his procedure, pt npo, will have PEG placement today. pt spoke w MD regarding procedure, hope to get nutrition, get stronger, avenue to receive his medications. Hope will be temporary, need to have esophagus rest & may be able to have another procedure at later time. pt for PEG today. met pt w wife, conversation w Dr Farfan & Naina speech pathologist, explained results of MBaS.  pt to have GI consult. spoke of pt hcp, from last hospitalization EMR, confirmed wife is hcp, sons are alt hcp. Will follow up regarding SARITA after GI eval. contact # given.      9/13/18: 1140hrs: met pt, reviewed results of his procedure, pt npo, will have PEG placement today. pt spoke w MD regarding procedure, hope to get nutrition, get stronger, avenue to receive his medications. Hope will be temporary, need to have esophagus rest & may be able to have another procedure at later time. pt for PEG today.    09/17/18: 1410hrs. met w pt & wife after speaking to Dr Bustamante, pt wants dnr, wife supports his decision. molst reviewed: pt agrees to dnr dni continue long term feeding tube, wants treatment, IV flds, antibiotics, return to hospital. Dr Bustamante completed molst form, order received. contact # given to wife. support to pt for decisions.

## 2019-08-14 ENCOUNTER — OFFICE VISIT (OUTPATIENT)
Dept: FAMILY MEDICINE CLINIC | Facility: CLINIC | Age: 24
End: 2019-08-14
Payer: COMMERCIAL

## 2019-08-14 VITALS
DIASTOLIC BLOOD PRESSURE: 88 MMHG | WEIGHT: 256 LBS | BODY MASS INDEX: 48.33 KG/M2 | SYSTOLIC BLOOD PRESSURE: 124 MMHG | HEIGHT: 61 IN | OXYGEN SATURATION: 98 % | HEART RATE: 84 BPM | TEMPERATURE: 98.1 F

## 2019-08-14 DIAGNOSIS — Z13.220 ENCOUNTER FOR SCREENING FOR LIPID DISORDER: ICD-10-CM

## 2019-08-14 DIAGNOSIS — F31.60 BIPOLAR AFFECTIVE DISORDER, CURRENT EPISODE MIXED, CURRENT EPISODE SEVERITY UNSPECIFIED (HCC): Primary | ICD-10-CM

## 2019-08-14 DIAGNOSIS — G81.91 RIGHT HEMIPARESIS (HCC): ICD-10-CM

## 2019-08-14 DIAGNOSIS — I10 ESSENTIAL HYPERTENSION: ICD-10-CM

## 2019-08-14 PROCEDURE — 99243 OFF/OP CNSLTJ NEW/EST LOW 30: CPT | Performed by: FAMILY MEDICINE

## 2019-08-14 RX ORDER — LISINOPRIL 5 MG/1
5 TABLET ORAL DAILY
Qty: 90 TABLET | Refills: 3 | Status: SHIPPED | OUTPATIENT
Start: 2019-08-14 | End: 2020-03-30 | Stop reason: SDUPTHER

## 2019-08-14 NOTE — PROGRESS NOTES
Assessment/Plan:         Diagnoses and all orders for this visit:    Bipolar affective disorder, current episode mixed, current episode severity unspecified (HCC)  -     sertraline (ZOLOFT) 50 mg tablet; Take 1 tablet (50 mg total) by mouth daily  -     Ambulatory referral to Psychiatry; Future    Essential hypertension  -     lisinopril (ZESTRIL) 5 mg tablet; Take 1 tablet (5 mg total) by mouth daily  -     CBC and differential; Future  -     Comprehensive metabolic panel; Future  -     Lipid panel; Future  -     Microalbumin / creatinine urine ratio; Future  -     TSH, 3rd generation; Future    Encounter for screening for lipid disorder    Right hemiparesis ([facility] [address] )        htn  , discussed plan of care with patient Has been on any hypertensive medications for number months will initiate with lisinopril 5 mg tab p o  Q day recheck blood pressure 2 weeks, instructed to go for blood work  Encourage weight loss program, smoking cessation  Right hemiparesis  Stable unchanged  DVT, chronic  Stable, to continue current anticoagulation therapy  Obesity  Encourage weight loss program  Bipolar disorder  Will start on last known medications with benefit referral placed for Psychiatry      Subjective:      Patient ID: María Metcalf is a 22 y o  female  Establish with   Has only been going to the ob / gyn , for the past 5 yrs , == now has 5 children, has a pair of twin, tub  Has been off the bipolar med for quite some time , = Zoloft?    Has not been to any psychiatrist , in the past has been on multiple medications and seen by Psychiatry does not have current psychiatrist, denies any H/S thoughts or plans, would like to start on medications previously and would like to be referred to Psychiatry  Blood pressure has been off blood pressure medicines for number weeks cannot recall the name of the medications, denies chest pain shortness of breath difficulty breathing, negative headaches dizziness change in vision   Has not had any recent blood work    Med hx   dvt  Has been on xarelto for the past 10yrs  htn   Bipolar depressionHemi paresis , right , at birth     Social history  Smoker  Negative alcohol  Negative drug use  Lives with  Five children            The following portions of the patient's history were reviewed and updated as appropriate:   She has a past medical history of Anxiety, Bipolar affective disorder ([facility] [address] ) (2017), Cardiac asystole ([facility] [address] ), Chronic hypertension with superimposed preeclampsia (2017), Depression, DVT (deep venous thrombosis) ([facility] [address] ) (), Intrauterine growth restriction affecting care of mother, antepartum, third trimester, fetus 1, Migraine, Multiple gestation, Right hemiparesis ([facility] [address] ), Seizures ([facility] [address] ), Severe preeclampsia, third trimester, Spontaneous , and Varicella  ,  does not have any pertinent problems on file  ,   has a past surgical history that includes Cholecystectomy; pr  delivery only (N/A, 2017);  section; pr  delivery only (N/A, 2018); and pr ligation,fallopian tube w/ (Bilateral, 2018)  ,  family history includes Deep vein thrombosis in her father and mother; Depression in her mother and sister; Diabetes in her maternal grandmother; Heart disease in her sister; Hypertension in her mother; Mental illness in her mother and sister; Pulmonary embolism in her father and mother; Thyroid disease in her mother  ,   reports that she has been smoking  She has never used smokeless tobacco  She reports that she does not drink alcohol or use drugs  ,  is allergic to bee venom; other; penicillins; and flu virus vaccine         Review of Systems   Constitutional: Negative for appetite change, chills, fever and unexpected weight change  HENT: Negative for congestion, dental problem, ear pain, hearing loss, postnasal drip, rhinorrhea, sinus pressure, sinus pain, sneezing, sore throat, tinnitus and voice change      Eyes: Negative for visual disturbance  Respiratory: Negative for apnea, cough, chest tightness and shortness of breath  Cardiovascular: Negative for chest pain, palpitations and leg swelling  Gastrointestinal: Negative for abdominal pain, blood in stool, constipation, diarrhea, nausea and vomiting  Endocrine: Negative for cold intolerance, heat intolerance, polydipsia, polyphagia and polyuria  Genitourinary: Negative for decreased urine volume, difficulty urinating, dysuria, frequency and hematuria  Musculoskeletal: Negative for arthralgias, back pain, gait problem, joint swelling and myalgias  Skin: Negative for color change, rash and wound  Allergic/Immunologic: Negative for environmental allergies and food allergies  Neurological: Negative for dizziness, syncope, weakness, light-headedness, numbness and headaches  Hematological: Negative for adenopathy  Does not bruise/bleed easily  Psychiatric/Behavioral: Negative for sleep disturbance and suicidal ideas  The patient is not nervous/anxious  Objective:  Vitals:    08/14/19 0959   BP: 124/88   Pulse: 84   Temp: 98 1 °F (36 7 °C)   SpO2: 98%      Physical Exam   Constitutional: She is oriented to person, place, and time  She appears well-developed and well-nourished  No distress  Morbidly obese   HENT:   Head: Normocephalic and atraumatic  Right Ear: External ear normal    Left Ear: External ear normal    Eyes: Conjunctivae are normal  No scleral icterus  Neck: Normal range of motion  Cardiovascular: Normal rate and regular rhythm  Pulmonary/Chest: Effort normal and breath sounds normal    Neurological: She is oriented to person, place, and time  Psychiatric: She has a normal mood and affect  Her speech is normal and behavior is normal  Judgment and thought content normal  Her mood appears not anxious  Her affect is not inappropriate   Thought content is not paranoid and not delusional  Cognition and memory are normal  She expresses no homicidal and no suicidal ideation  She expresses no suicidal plans and no homicidal plans

## 2019-08-19 ENCOUNTER — TELEPHONE (OUTPATIENT)
Dept: FAMILY MEDICINE CLINIC | Facility: CLINIC | Age: 24
End: 2019-08-19

## 2019-08-19 ENCOUNTER — TELEPHONE (OUTPATIENT)
Dept: BEHAVIORAL/MENTAL HEALTH CLINIC | Facility: CLINIC | Age: 24
End: 2019-08-19

## 2019-08-19 NOTE — TELEPHONE ENCOUNTER
Attempted to reach client back after scheduling  Upon looking at referral they were referred to psychiatry not behavioral health  Initial phone call to client, I was able to speak to her to cancel appointment however she was "busy" so she could not take information for doctor, requested this provider call back and leave her a message  However her phone does not receive messages  Tried all numbers associated with account, both patient and mother  Was unable to leave information for the referral to schedule  Will continue to try and also request  try  My appointment has been cancelled

## 2019-08-19 NOTE — TELEPHONE ENCOUNTER
Reached mother about referral, she reported they have been calling the number on referral with no success  She requested another doctor to call for medication that is in the area  She was given Taryn Cruz at [place], [place]  She also wants to keep the appointment for her daughter for talk therapy for the 3rd

## 2019-08-19 NOTE — TELEPHONE ENCOUNTER
Pt stated she has been calling Campbell Hawley, DO the Psychiatrist and he has yet to get back to her, she'd like a referral to a different psychiatrist

## 2019-08-20 DIAGNOSIS — F31.60 BIPOLAR AFFECTIVE DISORDER, CURRENT EPISODE MIXED, CURRENT EPISODE SEVERITY UNSPECIFIED (HCC): Primary | ICD-10-CM

## 2019-08-21 DIAGNOSIS — F31.60 BIPOLAR AFFECTIVE DISORDER, CURRENT EPISODE MIXED, CURRENT EPISODE SEVERITY UNSPECIFIED (HCC): Primary | ICD-10-CM

## 2019-08-28 ENCOUNTER — APPOINTMENT (OUTPATIENT)
Dept: LAB | Facility: HOSPITAL | Age: 24
End: 2019-08-28
Payer: COMMERCIAL

## 2019-08-28 DIAGNOSIS — I10 ESSENTIAL HYPERTENSION: ICD-10-CM

## 2019-08-28 LAB
ALBUMIN SERPL BCP-MCNC: 3.3 G/DL (ref 3.5–5)
ALP SERPL-CCNC: 88 U/L (ref 46–116)
ALT SERPL W P-5'-P-CCNC: 32 U/L (ref 12–78)
ANION GAP SERPL CALCULATED.3IONS-SCNC: 5 MMOL/L (ref 4–13)
AST SERPL W P-5'-P-CCNC: 26 U/L (ref 5–45)
BASOPHILS # BLD AUTO: 0.03 THOUSANDS/ΜL (ref 0–0.1)
BASOPHILS NFR BLD AUTO: 0 % (ref 0–1)
BILIRUB SERPL-MCNC: 0.5 MG/DL (ref 0.2–1)
BUN SERPL-MCNC: 13 MG/DL (ref 5–25)
CALCIUM SERPL-MCNC: 9 MG/DL (ref 8.3–10.1)
CHLORIDE SERPL-SCNC: 103 MMOL/L (ref 100–108)
CHOLEST SERPL-MCNC: 136 MG/DL (ref 50–200)
CO2 SERPL-SCNC: 31 MMOL/L (ref 21–32)
CREAT SERPL-MCNC: 0.64 MG/DL (ref 0.6–1.3)
CREAT UR-MCNC: 207 MG/DL
EOSINOPHIL # BLD AUTO: 0.18 THOUSAND/ΜL (ref 0–0.61)
EOSINOPHIL NFR BLD AUTO: 2 % (ref 0–6)
ERYTHROCYTE [DISTWIDTH] IN BLOOD BY AUTOMATED COUNT: 15.2 % (ref 11.6–15.1)
GFR SERPL CREATININE-BSD FRML MDRD: 126 ML/MIN/1.73SQ M
GLUCOSE P FAST SERPL-MCNC: 88 MG/DL (ref 65–99)
HCT VFR BLD AUTO: 39.7 % (ref 34.8–46.1)
HDLC SERPL-MCNC: 38 MG/DL (ref 40–60)
HGB BLD-MCNC: 12 G/DL (ref 11.5–15.4)
IMM GRANULOCYTES # BLD AUTO: 0.04 THOUSAND/UL (ref 0–0.2)
IMM GRANULOCYTES NFR BLD AUTO: 0 % (ref 0–2)
LDLC SERPL CALC-MCNC: 80 MG/DL (ref 0–100)
LYMPHOCYTES # BLD AUTO: 2.17 THOUSANDS/ΜL (ref 0.6–4.47)
LYMPHOCYTES NFR BLD AUTO: 24 % (ref 14–44)
MCH RBC QN AUTO: 24.7 PG (ref 26.8–34.3)
MCHC RBC AUTO-ENTMCNC: 30.2 G/DL (ref 31.4–37.4)
MCV RBC AUTO: 82 FL (ref 82–98)
MICROALBUMIN UR-MCNC: 21.1 MG/L (ref 0–20)
MICROALBUMIN/CREAT 24H UR: 10 MG/G CREATININE (ref 0–30)
MONOCYTES # BLD AUTO: 0.42 THOUSAND/ΜL (ref 0.17–1.22)
MONOCYTES NFR BLD AUTO: 5 % (ref 4–12)
NEUTROPHILS # BLD AUTO: 6.32 THOUSANDS/ΜL (ref 1.85–7.62)
NEUTS SEG NFR BLD AUTO: 69 % (ref 43–75)
NONHDLC SERPL-MCNC: 98 MG/DL
NRBC BLD AUTO-RTO: 0 /100 WBCS
PLATELET # BLD AUTO: 356 THOUSANDS/UL (ref 149–390)
PMV BLD AUTO: 9.3 FL (ref 8.9–12.7)
POTASSIUM SERPL-SCNC: 4.1 MMOL/L (ref 3.5–5.3)
PROT SERPL-MCNC: 8.1 G/DL (ref 6.4–8.2)
RBC # BLD AUTO: 4.85 MILLION/UL (ref 3.81–5.12)
SODIUM SERPL-SCNC: 139 MMOL/L (ref 136–145)
TRIGL SERPL-MCNC: 88 MG/DL
TSH SERPL DL<=0.05 MIU/L-ACNC: 1.36 UIU/ML (ref 0.36–3.74)
WBC # BLD AUTO: 9.16 THOUSAND/UL (ref 4.31–10.16)

## 2019-08-28 PROCEDURE — 82570 ASSAY OF URINE CREATININE: CPT

## 2019-08-28 PROCEDURE — 80053 COMPREHEN METABOLIC PANEL: CPT

## 2019-08-28 PROCEDURE — 80061 LIPID PANEL: CPT

## 2019-08-28 PROCEDURE — 36415 COLL VENOUS BLD VENIPUNCTURE: CPT

## 2019-08-28 PROCEDURE — 82043 UR ALBUMIN QUANTITATIVE: CPT

## 2019-08-28 PROCEDURE — 84443 ASSAY THYROID STIM HORMONE: CPT

## 2019-08-28 PROCEDURE — 85025 COMPLETE CBC W/AUTO DIFF WBC: CPT

## 2019-08-29 ENCOUNTER — OFFICE VISIT (OUTPATIENT)
Dept: FAMILY MEDICINE CLINIC | Facility: CLINIC | Age: 24
End: 2019-08-29
Payer: COMMERCIAL

## 2019-08-29 VITALS
BODY MASS INDEX: 47.5 KG/M2 | HEIGHT: 61 IN | WEIGHT: 251.6 LBS | TEMPERATURE: 98.3 F | HEART RATE: 94 BPM | OXYGEN SATURATION: 98 % | SYSTOLIC BLOOD PRESSURE: 120 MMHG | RESPIRATION RATE: 14 BRPM | DIASTOLIC BLOOD PRESSURE: 90 MMHG

## 2019-08-29 DIAGNOSIS — I10 ESSENTIAL HYPERTENSION: ICD-10-CM

## 2019-08-29 DIAGNOSIS — F31.60 BIPOLAR AFFECTIVE DISORDER, CURRENT EPISODE MIXED, CURRENT EPISODE SEVERITY UNSPECIFIED (HCC): ICD-10-CM

## 2019-08-29 DIAGNOSIS — R56.9 SEIZURE (HCC): Primary | ICD-10-CM

## 2019-08-29 DIAGNOSIS — G81.91 RIGHT HEMIPARESIS (HCC): ICD-10-CM

## 2019-08-29 DIAGNOSIS — I63.9 STROKE IN UTERO (HCC): ICD-10-CM

## 2019-08-29 PROCEDURE — 99214 OFFICE O/P EST MOD 30 MIN: CPT | Performed by: FAMILY MEDICINE

## 2019-09-03 PROCEDURE — 93000 ELECTROCARDIOGRAM COMPLETE: CPT | Performed by: FAMILY MEDICINE

## 2019-11-13 ENCOUNTER — TELEPHONE (OUTPATIENT)
Dept: FAMILY MEDICINE CLINIC | Facility: CLINIC | Age: 24
End: 2019-11-13

## 2019-11-13 ENCOUNTER — OFFICE VISIT (OUTPATIENT)
Dept: FAMILY MEDICINE CLINIC | Facility: CLINIC | Age: 24
End: 2019-11-13
Payer: COMMERCIAL

## 2019-11-13 VITALS
TEMPERATURE: 99.3 F | HEART RATE: 88 BPM | BODY MASS INDEX: 46.63 KG/M2 | OXYGEN SATURATION: 99 % | HEIGHT: 61 IN | WEIGHT: 247 LBS

## 2019-11-13 DIAGNOSIS — M62.830 MUSCLE SPASM OF BACK: Primary | ICD-10-CM

## 2019-11-13 DIAGNOSIS — F31.60 BIPOLAR AFFECTIVE DISORDER, CURRENT EPISODE MIXED, CURRENT EPISODE SEVERITY UNSPECIFIED (HCC): ICD-10-CM

## 2019-11-13 PROCEDURE — 99214 OFFICE O/P EST MOD 30 MIN: CPT | Performed by: FAMILY MEDICINE

## 2019-11-13 RX ORDER — TIZANIDINE HYDROCHLORIDE 2 MG/1
2 CAPSULE, GELATIN COATED ORAL 3 TIMES DAILY PRN
Qty: 30 CAPSULE | Refills: 0 | Status: SHIPPED | OUTPATIENT
Start: 2019-11-13 | End: 2019-11-13 | Stop reason: ALTCHOICE

## 2019-11-13 RX ORDER — METHOCARBAMOL 500 MG/1
500 TABLET, FILM COATED ORAL 2 TIMES DAILY PRN
Qty: 30 TABLET | Refills: 0 | Status: SHIPPED | OUTPATIENT
Start: 2019-11-13 | End: 2020-03-30 | Stop reason: SDUPTHER

## 2019-11-13 NOTE — PROGRESS NOTES
BMI Counseling: Body mass index is 46 67 kg/m²  The BMI is above normal  Nutrition recommendations include decreasing portion sizes, decreasing fast food intake, limiting drinks that contain sugar, moderation in carbohydrate intake and reducing intake of saturated and trans fat  Exercise recommendations include moderate physical activity 150 minutes/week  No pharmacotherapy was ordered  Assessment/Plan:     Chronic Problems:  No problem-specific Assessment & Plan notes found for this encounter  Visit Diagnosis:  Diagnoses and all orders for this visit:    Muscle spasm of back  -     TiZANidine (ZANAFLEX) 2 MG capsule; Take 1 capsule (2 mg total) by mouth 3 (three) times a day as needed for muscle spasms    Bipolar affective disorder, current episode mixed, current episode severity unspecified (HCC)      Back pain, muscle spasm  Discussed plan care patient discussed nature of work and work environment recommend frequent changes position adequate protection is elements proper hydration muscle relaxant as directed cautions given safety  Additionally recommended gentle massage to area,, warm moist heat  Bipolar disorder  Stressed importance patient maintaining scheduled appointments    Subjective:    Patient ID: Megan Martins is a 22 y o  female      C/o back pain upper for the past week   [employer]   Hurts most when doing my job standing and sitting , stands outside all day  Tylenol does not seem to give total relief    Denies numbness and tingling to back , neg radiation of pain ,   Tylenol not helping    Bipolar disorder did not follow up Psychiatry, has living [place], and to busy,  Will schedule follow-up appointment        The following portions of the patient's history were reviewed and updated as appropriate: allergies, current medications, past family history, past medical history, past social history, past surgical history and problem list     Review of Systems Constitutional: Negative for appetite change, chills, fatigue, fever and unexpected weight change  HENT: Negative for congestion, dental problem, ear pain, hearing loss, postnasal drip, rhinorrhea, sinus pressure, sinus pain, sneezing, sore throat, tinnitus and voice change  Eyes: Negative for visual disturbance  Respiratory: Negative for apnea, cough, chest tightness and shortness of breath  Cardiovascular: Negative for chest pain, palpitations and leg swelling  Gastrointestinal: Negative for abdominal pain, blood in stool, constipation, diarrhea, nausea and vomiting  Endocrine: Negative for cold intolerance, heat intolerance, polydipsia, polyphagia and polyuria  Genitourinary: Negative for decreased urine volume, difficulty urinating, dysuria, frequency and hematuria  Musculoskeletal: Positive for back pain and myalgias  Negative for arthralgias, gait problem and joint swelling  Skin: Negative for color change, rash and wound  Allergic/Immunologic: Negative for environmental allergies and food allergies  Neurological: Negative for dizziness, syncope, weakness, light-headedness, numbness and headaches  Hematological: Negative for adenopathy  Does not bruise/bleed easily  Psychiatric/Behavioral: Negative for sleep disturbance and suicidal ideas  The patient is not nervous/anxious            Pulse 88   Temp 99 3 °F (37 4 °C)   Ht 5' 1" (1 549 m)   Wt 112 kg (247 lb)   SpO2 99%   BMI 46 67 kg/m²   Social History     Socioeconomic History    Marital status: /Civil Union     Spouse name: Not on file    Number of children: Not on file    Years of education: Not on file    Highest education level: Not on file   Occupational History    Not on file   Social Needs    Financial resource strain: Not on file    Food insecurity:     Worry: Not on file     Inability: Not on file    Transportation needs:     Medical: Not on file     Non-medical: Not on file   Tobacco Use    Smoking status: Current Every Day Smoker    Smokeless tobacco: Never Used   Substance and Sexual Activity    Alcohol use: No    Drug use: No    Sexual activity: Yes     Partners: Male   Lifestyle    Physical activity:     Days per week: Not on file     Minutes per session: Not on file    Stress: Not on file   Relationships    Social connections:     Talks on phone: Not on file     Gets together: Not on file     Attends [employer] service: Not on file     Active member of club or organization: Not on file     Attends meetings of clubs or organizations: Not on file     Relationship status: Not on file    Intimate partner violence:     Fear of current or ex partner: Not on file     Emotionally abused: Not on file     Physically abused: Not on file     Forced sexual activity: Not on file   Other Topics Concern    Not on file   Social History Narrative    Denied caffeine use    Child in foster care    Engaged to be  (as per [employer])     Former smoker (as per [employer])    Never a smoker (as per [employer])      Past Medical History:   Diagnosis Date    Anxiety     takes Zoloft; stopped during pregnancy     Bipolar affective disorder ([facility] [address] ) 2017    Cardiac asystole ([facility] [address] )     Chronic hypertension with superimposed preeclampsia 2017    Depression     DVT (deep venous thrombosis) ([facility] [address] ) 2016    LLE-post cholecystectomy    Intrauterine growth restriction affecting care of mother, antepartum, third trimester, fetus 1     wi9th prior preg    Migraine     Multiple gestation     Right hemiparesis ([facility] [address] )     stroke in utero    Seizures ([facility] [address] )     in 2016    Severe preeclampsia, third trimester     Spontaneous      seven times    Varicella     in childhood      Family History   Problem Relation Age of Onset    Pulmonary embolism Mother     Deep vein thrombosis Mother     Depression Mother     Hypertension Mother     Thyroid disease Mother     Mental illness Mother bipolar    Diabetes Maternal Grandmother     Deep vein thrombosis Father     Pulmonary embolism Father     Depression Sister     Heart disease Sister     Mental illness Sister         bipolar schizo     Past Surgical History:   Procedure Laterality Date     SECTION      x3    CHOLECYSTECTOMY      MI  DELIVERY ONLY N/A 2017    2015 daughter 32 weeks, 2016 son, 2017 twin son and daughter, 31 weeks    MI  DELIVERY ONLY N/A 2018    Procedure:  SECTION () REPEAT;  Surgeon: Patria Amato MD;  Location: [facility] ;  Service: Obstetrics    MI LIGATION,FALLOPIAN TUBE W/ Bilateral 2018    Procedure: LIGATION/COAGULATION TUBAL;  Surgeon: Patria Amato MD;  Location: [facility] ;  Service: Obstetrics       Current Outpatient Medications:     acetaminophen (TYLENOL) 325 mg tablet, Take 2 tablets (650 mg total) by mouth every 6 (six) hours as needed for mild pain, moderate pain, headaches or fever, Disp: 30 tablet, Rfl: 0    aspirin 81 mg chewable tablet, Chew 1 tablet (81 mg total) daily (Patient not taking: Reported on 2019), Disp: 90 tablet, Rfl: 3    EPINEPHrine (EPIPEN 2-TRENTON) 0 3 mg/0 3 mL SOAJ, EpiPen 2-[place] 0 3 MG/0 3ML JORDON INJECT INTO THE UPPER OUTER THIGH AS NEEDED FOR A SEVERE ALLERGIC REACTION  Quantity: 1;  Refills: 3   Katrin Tompkins ;  Start 15-Apr-2014 Active 2 EA Pen, Disp: , Rfl:     lisinopril (ZESTRIL) 5 mg tablet, Take 1 tablet (5 mg total) by mouth daily, Disp: 90 tablet, Rfl: 3    rivaroxaban (XARELTO) 10 mg tablet, Take 1 tablet (10 mg total) by mouth daily (Patient not taking: Reported on 2019), Disp: 35 tablet, Rfl: 0    sertraline (ZOLOFT) 50 mg tablet, Take 1 tablet (50 mg total) by mouth daily, Disp: 30 tablet, Rfl: 5    TiZANidine (ZANAFLEX) 2 MG capsule, Take 1 capsule (2 mg total) by mouth 3 (three) times a day as needed for muscle spasms, Disp: 30 capsule, Rfl: 0    Allergies   Allergen Reactions    Bee Venom Anaphylaxis    Other Anaphylaxis and Swelling     Honey     Penicillins Anaphylaxis and Swelling    Flu Virus Vaccine Rash          Lab Review   Appointment on 08/28/2019   Component Date Value    WBC 08/28/2019 9 16     RBC 08/28/2019 4 85     Hemoglobin 08/28/2019 12 0     Hematocrit 08/28/2019 39 7     MCV 08/28/2019 82     MCH 08/28/2019 24 7*    MCHC 08/28/2019 30 2*    RDW 08/28/2019 15 2*    MPV 08/28/2019 9 3     Platelets 77/55/3415 356     nRBC 08/28/2019 0     Neutrophils Relative 08/28/2019 69     Immat GRANS % 08/28/2019 0     Lymphocytes Relative 08/28/2019 24     Monocytes Relative 08/28/2019 5     Eosinophils Relative 08/28/2019 2     Basophils Relative 08/28/2019 0     Neutrophils Absolute 08/28/2019 6 32     Immature Grans Absolute 08/28/2019 0 04     Lymphocytes Absolute 08/28/2019 2 17     Monocytes Absolute 08/28/2019 0 42     Eosinophils Absolute 08/28/2019 0 18     Basophils Absolute 08/28/2019 0 03     Sodium 08/28/2019 139     Potassium 08/28/2019 4 1     Chloride 08/28/2019 103     CO2 08/28/2019 31     ANION GAP 08/28/2019 5     BUN 08/28/2019 13     Creatinine 08/28/2019 0 64     Glucose, Fasting 08/28/2019 88     Calcium 08/28/2019 9 0     AST 08/28/2019 26     ALT 08/28/2019 32     Alkaline Phosphatase 08/28/2019 88     Total Protein 08/28/2019 8 1     Albumin 08/28/2019 3 3*    Total Bilirubin 08/28/2019 0 50     eGFR 08/28/2019 126     Cholesterol 08/28/2019 136     Triglycerides 08/28/2019 88     HDL, Direct 08/28/2019 38*    LDL Calculated 08/28/2019 80     Non-HDL-Chol (CHOL-HDL) 08/28/2019 98     Creatinine, Ur 08/28/2019 207 0     Microalbum  ,U,Random 08/28/2019 21 1*    Microalb Creat Ratio 08/28/2019 10     TSH 3RD GENERATON 08/28/2019 1 364         Imaging: No results found  Objective:     Physical Exam   Constitutional: She is oriented to person, place, and time  She appears well-developed and well-nourished   No distress  HENT:   Head: Normocephalic and atraumatic  Neck: Normal range of motion  Neck supple  Cardiovascular: Normal rate, regular rhythm and normal heart sounds  Pulmonary/Chest: Effort normal and breath sounds normal    Musculoskeletal: Normal range of motion  She exhibits tenderness  Left scapular inferior superior tenderness   Neurological: She is alert and oriented to person, place, and time  She has normal reflexes  Skin: Skin is warm and dry  There are no Patient Instructions on file for this visit  DOV [place]    Portions of the record may have been created with voice recognition software  Occasional wrong word or "sound a like" substitutions may have occurred due to the inherent limitations of voice recognition software  Read the chart carefully and recognize, using context, where substitutions have occurred

## 2020-03-30 ENCOUNTER — TELEMEDICINE (OUTPATIENT)
Dept: FAMILY MEDICINE CLINIC | Facility: CLINIC | Age: 25
End: 2020-03-30
Payer: COMMERCIAL

## 2020-03-30 DIAGNOSIS — I10 ESSENTIAL HYPERTENSION: ICD-10-CM

## 2020-03-30 DIAGNOSIS — O34.219 PREVIOUS CESAREAN SECTION COMPLICATING PREGNANCY: ICD-10-CM

## 2020-03-30 DIAGNOSIS — F31.60 BIPOLAR AFFECTIVE DISORDER, CURRENT EPISODE MIXED, CURRENT EPISODE SEVERITY UNSPECIFIED (HCC): ICD-10-CM

## 2020-03-30 DIAGNOSIS — M62.830 MUSCLE SPASM OF BACK: ICD-10-CM

## 2020-03-30 DIAGNOSIS — F41.9 ANXIETY: Primary | ICD-10-CM

## 2020-03-30 PROBLEM — M62.838 MUSCLE SPASM: Status: ACTIVE | Noted: 2020-03-30

## 2020-03-30 PROCEDURE — G2012 BRIEF CHECK IN BY MD/QHP: HCPCS | Performed by: FAMILY MEDICINE

## 2020-03-30 RX ORDER — LISINOPRIL 5 MG/1
5 TABLET ORAL DAILY
Qty: 90 TABLET | Refills: 3 | Status: SHIPPED | OUTPATIENT
Start: 2020-03-30 | End: 2020-08-31 | Stop reason: SDUPTHER

## 2020-03-30 RX ORDER — METHOCARBAMOL 500 MG/1
500 TABLET, FILM COATED ORAL 2 TIMES DAILY PRN
Qty: 30 TABLET | Refills: 0 | Status: SHIPPED | OUTPATIENT
Start: 2020-03-30 | End: 2020-06-02 | Stop reason: SDUPTHER

## 2020-03-30 NOTE — PROGRESS NOTES
Virtual Brief Visit    Problem List Items Addressed This Visit        Other    Anxiety - Primary                Reason for visit is   Discuss meds and needs refills    Encounter provider DOV Lloyd    Provider located at [facility] [phone] [address] [phone]      Recent Visits  No visits were found meeting these conditions  Showing recent visits within past 7 days and meeting all other requirements     Today's Visits  Date Type Provider Dept   03/30/20 Telemedicine DOV Lloyd [address] today's visits and meeting all other requirements     Future Appointments  Date Type Provider Dept   03/30/20 Telemedicine DOV Lloyd [address] future appointments within next 150 days and meeting all other requirements        After connecting through telephone, the patient was identified by name and date of birth  Rudy Fox was informed that this is a telemedicine visit and that the visit is being conducted through telephone  My office door was closed  No one else was in the room  She acknowledged consent and understanding of privacy and security of the video platform  The patient has agreed to participate and understands they can discontinue the visit at any time  Patient is aware this is a billable service  Subjective  Rudy Fox is a 22 y o  female   Anxiety , presently well , no issues to speak of dealing with current situations , will need refill on the zoloft     Muscle spasm, back   In the past has been taking robaxin , works well, would like a refill   Denies any current problem, just in case  Chronic dvt , cva in utero  Continues on xarelto  Denies any bleedding complaints/ concerns   Htn , no changes in medication         Past Medical History:   Diagnosis Date    Anxiety     takes Zoloft; stopped during pregnancy     Bipolar affective disorder ([facility] ) 2017    Cardiac asystole (HCC)     Chronic hypertension with superimposed preeclampsia 2017    Depression     DVT (deep venous thrombosis) ([facility] )     LLE-post cholecystectomy    Intrauterine growth restriction affecting care of mother, antepartum, third trimester, fetus 1     wi9th prior preg    Migraine     Multiple gestation     Right hemiparesis ([facility] )     stroke in utero    [facility]     in 2016    Severe preeclampsia, third trimester     Spontaneous      seven times    Varicella     in childhood        Past Surgical History:   Procedure Laterality Date     SECTION      x3    CHOLECYSTECTOMY      FL  DELIVERY ONLY N/A 2017    2015 daughter 32 weeks, 2016 son, 2017 twin son and daughter, 31 weeks    FL  DELIVERY ONLY N/A 2018    Procedure:  SECTION () REPEAT;  Surgeon: Crissy Horn MD;  Location: [facility] ;  Service: Obstetrics    FL Deyvi Landsberg TUBE W/ Bilateral 2018    Procedure: LIGATION/COAGULATION TUBAL;  Surgeon: Crissy Horn MD;  Location: [facility] ;  Service: Obstetrics       Current Outpatient Medications   Medication Sig Dispense Refill    acetaminophen (TYLENOL) 325 mg tablet Take 2 tablets (650 mg total) by mouth every 6 (six) hours as needed for mild pain, moderate pain, headaches or fever 30 tablet 0    aspirin 81 mg chewable tablet Chew 1 tablet (81 mg total) daily (Patient not taking: Reported on 2019) 90 tablet 3    EPINEPHrine (EPIPEN 2-TRENTON) 0 3 mg/0 3 mL SOAJ EpiPen 2-[place] 0 3 MG/0 3ML JORDON  INJECT INTO THE UPPER OUTER THIGH AS NEEDED FOR A SEVERE ALLERGIC REACTION   Quantity: 1;  Refills: 3      Katrin Hobson ;  Start 15-Apr-2014  Active  2 EA Pen      lisinopril (ZESTRIL) 5 mg tablet Take 1 tablet (5 mg total) by mouth daily 90 tablet 3    methocarbamol (ROBAXIN) 500 mg tablet Take 1 tablet (500 mg total) by mouth 2 (two) times a day as needed for muscle spasms 30 tablet 0    rivaroxaban (XARELTO) 10 mg tablet Take 1 tablet (10 mg total) by mouth daily (Patient not taking: Reported on 8/14/2019) 35 tablet 0    sertraline (ZOLOFT) 50 mg tablet Take 1 tablet (50 mg total) by mouth daily 30 tablet 5     No current facility-administered medications for this visit  Allergies   Allergen Reactions    Bee Venom Anaphylaxis    Other Anaphylaxis and Swelling     Honey     Penicillins Anaphylaxis and Swelling    Flu Virus Vaccine Rash       Review of Systems   Constitutional: Negative for appetite change, chills, fever and unexpected weight change  HENT: Negative for congestion, dental problem, ear pain, hearing loss, postnasal drip, rhinorrhea, sinus pressure, sinus pain, sneezing, sore throat, tinnitus and voice change  Eyes: Negative for visual disturbance  Respiratory: Negative for apnea, cough, chest tightness and shortness of breath  Cardiovascular: Negative for chest pain, palpitations and leg swelling  Gastrointestinal: Negative for abdominal pain, blood in stool, constipation, diarrhea, nausea and vomiting  Endocrine: Negative for cold intolerance, heat intolerance, polydipsia, polyphagia and polyuria  Genitourinary: Negative for decreased urine volume, difficulty urinating, dysuria, frequency and hematuria  Musculoskeletal: Negative for arthralgias, back pain, gait problem, joint swelling and myalgias  Skin: Negative for color change, rash and wound  Allergic/Immunologic: Negative for environmental allergies and food allergies  Neurological: Negative for dizziness, syncope, weakness, light-headedness, numbness and headaches  Hematological: Negative for adenopathy  Does not bruise/bleed easily  Psychiatric/Behavioral: Negative for sleep disturbance and suicidal ideas  The patient is not nervous/anxious            I spent 20 minutes with the patient during this visit

## 2020-06-01 ENCOUNTER — APPOINTMENT (OUTPATIENT)
Dept: LAB | Facility: CLINIC | Age: 25
End: 2020-06-01
Payer: COMMERCIAL

## 2020-06-01 ENCOUNTER — ULTRASOUND (OUTPATIENT)
Dept: OBGYN CLINIC | Age: 25
End: 2020-06-01
Payer: COMMERCIAL

## 2020-06-01 VITALS
BODY MASS INDEX: 51.73 KG/M2 | WEIGHT: 273.8 LBS | TEMPERATURE: 98.7 F | SYSTOLIC BLOOD PRESSURE: 138 MMHG | DIASTOLIC BLOOD PRESSURE: 94 MMHG

## 2020-06-01 DIAGNOSIS — N91.2 AMENORRHEA: Primary | ICD-10-CM

## 2020-06-01 DIAGNOSIS — O20.0 THREATENED ABORTION: ICD-10-CM

## 2020-06-01 LAB — B-HCG SERPL-ACNC: <2 MIU/ML

## 2020-06-01 PROCEDURE — 84702 CHORIONIC GONADOTROPIN TEST: CPT | Performed by: NURSE PRACTITIONER

## 2020-06-01 PROCEDURE — 36415 COLL VENOUS BLD VENIPUNCTURE: CPT | Performed by: NURSE PRACTITIONER

## 2020-06-01 PROCEDURE — 76817 TRANSVAGINAL US OBSTETRIC: CPT | Performed by: NURSE PRACTITIONER

## 2020-06-02 ENCOUNTER — OFFICE VISIT (OUTPATIENT)
Dept: FAMILY MEDICINE CLINIC | Facility: CLINIC | Age: 25
End: 2020-06-02
Payer: COMMERCIAL

## 2020-06-02 VITALS
BODY MASS INDEX: 51.92 KG/M2 | HEIGHT: 61 IN | DIASTOLIC BLOOD PRESSURE: 80 MMHG | SYSTOLIC BLOOD PRESSURE: 120 MMHG | RESPIRATION RATE: 18 BRPM | HEART RATE: 106 BPM | OXYGEN SATURATION: 97 % | WEIGHT: 275 LBS | TEMPERATURE: 97 F

## 2020-06-02 DIAGNOSIS — Z13.220 ENCOUNTER FOR SCREENING FOR LIPID DISORDER: ICD-10-CM

## 2020-06-02 DIAGNOSIS — G81.91 RIGHT HEMIPARESIS (HCC): ICD-10-CM

## 2020-06-02 DIAGNOSIS — I10 ESSENTIAL HYPERTENSION: Primary | ICD-10-CM

## 2020-06-02 DIAGNOSIS — M62.830 MUSCLE SPASM OF BACK: ICD-10-CM

## 2020-06-02 DIAGNOSIS — F31.60 BIPOLAR AFFECTIVE DISORDER, CURRENT EPISODE MIXED, CURRENT EPISODE SEVERITY UNSPECIFIED (HCC): ICD-10-CM

## 2020-06-02 DIAGNOSIS — Z91.030 BEE STING ALLERGY: ICD-10-CM

## 2020-06-02 DIAGNOSIS — M54.50 LOW BACK PAIN WITHOUT SCIATICA, UNSPECIFIED BACK PAIN LATERALITY, UNSPECIFIED CHRONICITY: ICD-10-CM

## 2020-06-02 PROCEDURE — 99214 OFFICE O/P EST MOD 30 MIN: CPT | Performed by: FAMILY MEDICINE

## 2020-06-02 PROCEDURE — 1036F TOBACCO NON-USER: CPT | Performed by: FAMILY MEDICINE

## 2020-06-02 RX ORDER — EPINEPHRINE 0.3 MG/.3ML
0.3 INJECTION SUBCUTANEOUS ONCE
Qty: 0.6 ML | Refills: 0 | Status: SHIPPED | OUTPATIENT
Start: 2020-06-02 | End: 2020-08-31 | Stop reason: SDUPTHER

## 2020-06-02 RX ORDER — METHOCARBAMOL 500 MG/1
500 TABLET, FILM COATED ORAL 2 TIMES DAILY PRN
Qty: 30 TABLET | Refills: 0 | Status: SHIPPED | OUTPATIENT
Start: 2020-06-02 | End: 2021-08-04 | Stop reason: CLARIF

## 2020-06-05 ENCOUNTER — TELEPHONE (OUTPATIENT)
Dept: FAMILY MEDICINE CLINIC | Facility: CLINIC | Age: 25
End: 2020-06-05

## 2020-06-08 ENCOUNTER — APPOINTMENT (OUTPATIENT)
Dept: LAB | Facility: HOSPITAL | Age: 25
End: 2020-06-08
Payer: COMMERCIAL

## 2020-06-08 DIAGNOSIS — I10 ESSENTIAL HYPERTENSION: ICD-10-CM

## 2020-06-08 DIAGNOSIS — Z13.220 ENCOUNTER FOR SCREENING FOR LIPID DISORDER: ICD-10-CM

## 2020-06-08 LAB
ALBUMIN SERPL BCP-MCNC: 3.1 G/DL (ref 3.5–5)
ALP SERPL-CCNC: 96 U/L (ref 46–116)
ALT SERPL W P-5'-P-CCNC: 20 U/L (ref 12–78)
ANION GAP SERPL CALCULATED.3IONS-SCNC: 10 MMOL/L (ref 4–13)
AST SERPL W P-5'-P-CCNC: 14 U/L (ref 5–45)
BASOPHILS # BLD AUTO: 0.04 THOUSANDS/ΜL (ref 0–0.1)
BASOPHILS NFR BLD AUTO: 0 % (ref 0–1)
BILIRUB SERPL-MCNC: 0.4 MG/DL (ref 0.2–1)
BUN SERPL-MCNC: 15 MG/DL (ref 5–25)
CALCIUM SERPL-MCNC: 8.7 MG/DL (ref 8.3–10.1)
CHLORIDE SERPL-SCNC: 104 MMOL/L (ref 100–108)
CHOLEST SERPL-MCNC: 152 MG/DL (ref 50–200)
CO2 SERPL-SCNC: 25 MMOL/L (ref 21–32)
CREAT SERPL-MCNC: 0.46 MG/DL (ref 0.6–1.3)
CREAT UR-MCNC: 131 MG/DL
EOSINOPHIL # BLD AUTO: 0.25 THOUSAND/ΜL (ref 0–0.61)
EOSINOPHIL NFR BLD AUTO: 2 % (ref 0–6)
ERYTHROCYTE [DISTWIDTH] IN BLOOD BY AUTOMATED COUNT: 14.1 % (ref 11.6–15.1)
EST. AVERAGE GLUCOSE BLD GHB EST-MCNC: 100 MG/DL
GFR SERPL CREATININE-BSD FRML MDRD: 141 ML/MIN/1.73SQ M
GLUCOSE P FAST SERPL-MCNC: 89 MG/DL (ref 65–99)
HBA1C MFR BLD: 5.1 %
HCT VFR BLD AUTO: 39.4 % (ref 34.8–46.1)
HDLC SERPL-MCNC: 51 MG/DL
HGB BLD-MCNC: 12.4 G/DL (ref 11.5–15.4)
IMM GRANULOCYTES # BLD AUTO: 0.1 THOUSAND/UL (ref 0–0.2)
IMM GRANULOCYTES NFR BLD AUTO: 1 % (ref 0–2)
LDLC SERPL CALC-MCNC: 85 MG/DL (ref 0–100)
LYMPHOCYTES # BLD AUTO: 2.34 THOUSANDS/ΜL (ref 0.6–4.47)
LYMPHOCYTES NFR BLD AUTO: 22 % (ref 14–44)
MCH RBC QN AUTO: 25.5 PG (ref 26.8–34.3)
MCHC RBC AUTO-ENTMCNC: 31.5 G/DL (ref 31.4–37.4)
MCV RBC AUTO: 81 FL (ref 82–98)
MICROALBUMIN UR-MCNC: 11.3 MG/L (ref 0–20)
MICROALBUMIN/CREAT 24H UR: 9 MG/G CREATININE (ref 0–30)
MONOCYTES # BLD AUTO: 0.53 THOUSAND/ΜL (ref 0.17–1.22)
MONOCYTES NFR BLD AUTO: 5 % (ref 4–12)
NEUTROPHILS # BLD AUTO: 7.46 THOUSANDS/ΜL (ref 1.85–7.62)
NEUTS SEG NFR BLD AUTO: 70 % (ref 43–75)
NONHDLC SERPL-MCNC: 101 MG/DL
NRBC BLD AUTO-RTO: 0 /100 WBCS
PLATELET # BLD AUTO: 425 THOUSANDS/UL (ref 149–390)
PMV BLD AUTO: 8.8 FL (ref 8.9–12.7)
POTASSIUM SERPL-SCNC: 3.9 MMOL/L (ref 3.5–5.3)
PROT SERPL-MCNC: 8 G/DL (ref 6.4–8.2)
RBC # BLD AUTO: 4.86 MILLION/UL (ref 3.81–5.12)
SODIUM SERPL-SCNC: 139 MMOL/L (ref 136–145)
TRIGL SERPL-MCNC: 78 MG/DL
TSH SERPL DL<=0.05 MIU/L-ACNC: 1.57 UIU/ML (ref 0.36–3.74)
WBC # BLD AUTO: 10.72 THOUSAND/UL (ref 4.31–10.16)

## 2020-06-08 PROCEDURE — 80053 COMPREHEN METABOLIC PANEL: CPT

## 2020-06-08 PROCEDURE — 85025 COMPLETE CBC W/AUTO DIFF WBC: CPT

## 2020-06-08 PROCEDURE — 84443 ASSAY THYROID STIM HORMONE: CPT

## 2020-06-08 PROCEDURE — 83036 HEMOGLOBIN GLYCOSYLATED A1C: CPT

## 2020-06-08 PROCEDURE — 80061 LIPID PANEL: CPT

## 2020-06-08 PROCEDURE — 82043 UR ALBUMIN QUANTITATIVE: CPT

## 2020-06-08 PROCEDURE — 36415 COLL VENOUS BLD VENIPUNCTURE: CPT

## 2020-06-08 PROCEDURE — 82570 ASSAY OF URINE CREATININE: CPT

## 2020-06-09 ENCOUNTER — TELEPHONE (OUTPATIENT)
Dept: FAMILY MEDICINE CLINIC | Facility: CLINIC | Age: 25
End: 2020-06-09

## 2020-06-11 ENCOUNTER — EVALUATION (OUTPATIENT)
Dept: PHYSICAL THERAPY | Facility: CLINIC | Age: 25
End: 2020-06-11
Payer: COMMERCIAL

## 2020-06-11 DIAGNOSIS — M62.830 MUSCLE SPASM OF BACK: ICD-10-CM

## 2020-06-11 DIAGNOSIS — M54.50 LOW BACK PAIN WITHOUT SCIATICA, UNSPECIFIED BACK PAIN LATERALITY, UNSPECIFIED CHRONICITY: ICD-10-CM

## 2020-06-11 PROCEDURE — 97112 NEUROMUSCULAR REEDUCATION: CPT | Performed by: PHYSICAL THERAPIST

## 2020-06-11 PROCEDURE — 97162 PT EVAL MOD COMPLEX 30 MIN: CPT | Performed by: PHYSICAL THERAPIST

## 2020-06-22 ENCOUNTER — APPOINTMENT (OUTPATIENT)
Dept: PHYSICAL THERAPY | Facility: CLINIC | Age: 25
End: 2020-06-22
Payer: COMMERCIAL

## 2020-06-25 ENCOUNTER — APPOINTMENT (OUTPATIENT)
Dept: PHYSICAL THERAPY | Facility: CLINIC | Age: 25
End: 2020-06-25
Payer: COMMERCIAL

## 2020-06-29 ENCOUNTER — APPOINTMENT (OUTPATIENT)
Dept: PHYSICAL THERAPY | Facility: CLINIC | Age: 25
End: 2020-06-29
Payer: COMMERCIAL

## 2020-08-25 ENCOUNTER — TELEPHONE (OUTPATIENT)
Dept: FAMILY MEDICINE CLINIC | Facility: CLINIC | Age: 25
End: 2020-08-25

## 2020-08-25 NOTE — TELEPHONE ENCOUNTER
She called to schedule an appt next Monday for swollen, water retaining feet, hard to walk  As per Bill, keep feet elevated, plenty of fluid to hydrate, stop taking any anti inflammatories  She states understanding and accepts    appt made for 8/31/20

## 2020-08-31 ENCOUNTER — OFFICE VISIT (OUTPATIENT)
Dept: FAMILY MEDICINE CLINIC | Facility: CLINIC | Age: 25
End: 2020-08-31
Payer: COMMERCIAL

## 2020-08-31 VITALS
DIASTOLIC BLOOD PRESSURE: 86 MMHG | RESPIRATION RATE: 18 BRPM | HEART RATE: 91 BPM | WEIGHT: 293 LBS | OXYGEN SATURATION: 97 % | BODY MASS INDEX: 55.32 KG/M2 | SYSTOLIC BLOOD PRESSURE: 132 MMHG | HEIGHT: 61 IN

## 2020-08-31 DIAGNOSIS — R60.0 PEDAL EDEMA: ICD-10-CM

## 2020-08-31 DIAGNOSIS — I10 ESSENTIAL HYPERTENSION: Primary | ICD-10-CM

## 2020-08-31 DIAGNOSIS — Z91.030 BEE STING ALLERGY: ICD-10-CM

## 2020-08-31 DIAGNOSIS — R56.9 SEIZURE (HCC): ICD-10-CM

## 2020-08-31 DIAGNOSIS — I10 ESSENTIAL HYPERTENSION: ICD-10-CM

## 2020-08-31 DIAGNOSIS — G81.91 RIGHT HEMIPARESIS (HCC): ICD-10-CM

## 2020-08-31 PROBLEM — Z34.83 PRENATAL CARE, SUBSEQUENT PREGNANCY, THIRD TRIMESTER: Status: RESOLVED | Noted: 2018-06-20 | Resolved: 2020-08-31

## 2020-08-31 PROBLEM — E66.01 MATERNAL MORBID OBESITY IN THIRD TRIMESTER, ANTEPARTUM (HCC): Status: RESOLVED | Noted: 2018-04-18 | Resolved: 2020-08-31

## 2020-08-31 PROBLEM — O09.892 HISTORY OF PRETERM DELIVERY, CURRENTLY PREGNANT IN SECOND TRIMESTER: Status: RESOLVED | Noted: 2018-03-28 | Resolved: 2020-08-31

## 2020-08-31 PROBLEM — O99.213 MATERNAL MORBID OBESITY IN THIRD TRIMESTER, ANTEPARTUM (HCC): Status: RESOLVED | Noted: 2018-04-18 | Resolved: 2020-08-31

## 2020-08-31 PROBLEM — O09.93 PREGNANCY, SUPERVISION, HIGH-RISK, THIRD TRIMESTER: Status: RESOLVED | Noted: 2018-03-28 | Resolved: 2020-08-31

## 2020-08-31 PROBLEM — O20.0 THREATENED ABORTION: Status: RESOLVED | Noted: 2020-06-01 | Resolved: 2020-08-31

## 2020-08-31 PROBLEM — O34.219 HISTORY OF CESAREAN DELIVERY AFFECTING PREGNANCY: Status: RESOLVED | Noted: 2018-03-28 | Resolved: 2020-08-31

## 2020-08-31 PROBLEM — Z98.891 S/P REPEAT LOW TRANSVERSE C-SECTION: Status: RESOLVED | Noted: 2018-07-20 | Resolved: 2020-08-31

## 2020-08-31 PROBLEM — O09.293 HISTORY OF PRE-ECLAMPSIA IN PRIOR PREGNANCY, CURRENTLY PREGNANT, THIRD TRIMESTER: Status: RESOLVED | Noted: 2018-03-28 | Resolved: 2020-08-31

## 2020-08-31 LAB — SL AMB POCT URINE HCG: NEGATIVE

## 2020-08-31 PROCEDURE — 99214 OFFICE O/P EST MOD 30 MIN: CPT | Performed by: NURSE PRACTITIONER

## 2020-08-31 PROCEDURE — 3075F SYST BP GE 130 - 139MM HG: CPT | Performed by: NURSE PRACTITIONER

## 2020-08-31 PROCEDURE — 81025 URINE PREGNANCY TEST: CPT | Performed by: NURSE PRACTITIONER

## 2020-08-31 PROCEDURE — 1036F TOBACCO NON-USER: CPT | Performed by: NURSE PRACTITIONER

## 2020-08-31 PROCEDURE — 3008F BODY MASS INDEX DOCD: CPT | Performed by: NURSE PRACTITIONER

## 2020-08-31 PROCEDURE — 3079F DIAST BP 80-89 MM HG: CPT | Performed by: NURSE PRACTITIONER

## 2020-08-31 RX ORDER — LISINOPRIL 5 MG/1
5 TABLET ORAL DAILY
Qty: 90 TABLET | Refills: 1 | Status: SHIPPED | OUTPATIENT
Start: 2020-08-31 | End: 2021-05-12 | Stop reason: SDUPTHER

## 2020-08-31 RX ORDER — EPINEPHRINE 0.3 MG/.3ML
0.3 INJECTION SUBCUTANEOUS ONCE
Qty: 0.6 ML | Refills: 0 | Status: SHIPPED | OUTPATIENT
Start: 2020-08-31 | End: 2021-08-11 | Stop reason: SDUPTHER

## 2020-08-31 NOTE — ASSESSMENT & PLAN NOTE
Patient reports to be diagnosed with seizures 3 years ago during pregnancy  She does not recall having an EEG or being placed on any medications  Her seizures returned 2 days ago  Advised to obtain EEG, will call with results  Provided referral to neurologist, advised to make an appointment today  To ER for any further seizure activity, stressed importance

## 2020-08-31 NOTE — ASSESSMENT & PLAN NOTE
To obtain labs, will call with results  Counseled on weight loss, elevating extremities, and low-sodium diet  Follow-up in 2 weeks or sooner if needed

## 2020-08-31 NOTE — PROGRESS NOTES
Assessment/Plan:    Seizure [facility])  Patient reports to be diagnosed with seizures 3 years ago during pregnancy  She does not recall having an EEG or being placed on any medications  Her seizures returned 2 days ago  Advised to obtain EEG, will call with results  Provided referral to neurologist, advised to make an appointment today  To ER for any further seizure activity, stressed importance  Pedal edema  To obtain labs, will call with results  Counseled on weight loss, elevating extremities, and low-sodium diet  Follow-up in 2 weeks or sooner if needed  Diagnoses and all orders for this visit:    Essential hypertension  -     CBC and differential; Future  -     Comprehensive metabolic panel; Future  -     Lipid Panel with Direct LDL reflex; Future  -     TSH, 3rd generation with Free T4 reflex; Future  -     UA w Reflex to Microscopic w Reflex to Culture  -     POCT urine HCG    Seizure ([facility] )  -     EEG Routine and awake; Future  -     Ambulatory referral to Neurology; Future    Right hemiparesis (HCC)    Pedal edema          Subjective:      Patient ID: Jennifer Shannon is a 26 y o  female  Rusty Conde presents reporting seizure activity that started 2 days ago  She was diagnosed with seizures around 3 years ago during pregnancy  Rusty Conde was also told she had seizures as a child but does not know any further information  She does not recall having an EEG and is not currently connected with a neurologist   Farhana Sutton was her last seizure  Rusty Conde reports her significant other recalls her staring off for about 15-20 minutes  After her symptoms resolved, Rusty Conde could not recall who her significant other was  She also developed headache during this time  It is located in her left orbital region and described as a throbbing pain  She has nausea, vomiting, and photosensitivity  She will treat her symptoms with Tylenol which provides minimal relief  Rusty Conde is concerned she may be pregnant    She is currently sexually active and not using any contraception  She did have a miscarriage 3 months ago  The following portions of the patient's history were reviewed and updated as appropriate:   She   Patient Active Problem List    Diagnosis Date Noted    Seizure ([facility] ) 08/31/2020    Pedal edema 08/31/2020    Low back pain without sciatica 06/02/2020    Essential hypertension 06/02/2020    Amenorrhea 06/01/2020    Muscle spasm of back 03/30/2020    History of bilateral tubal ligation 07/20/2018    Chronic hypertension in obstetric context in third trimester 06/12/2018    History of deep vein thrombosis (DVT) during pregnancy 04/18/2018    Request for sterilization 04/18/2018    Anxiety 06/24/2017    [facility] 06/24/2017    Right-sided muscle weakness 06/24/2017    History of thromboembolism of vein 06/24/2017    Adiposity 06/24/2017    Cerebral infarction ([facility] ) 06/24/2017    Right hemiparesis ([facility] ) 02/07/2017    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    DVT (deep venous thrombosis) ([facility]) 01/01/2016     Current Outpatient Medications   Medication Sig Dispense Refill    lisinopril (ZESTRIL) 5 mg tablet Take 1 tablet (5 mg total) by mouth daily 90 tablet 3    methocarbamol (ROBAXIN) 500 mg tablet Take 1 tablet (500 mg total) by mouth 2 (two) times a day as needed for muscle spasms 30 tablet 0    sertraline (ZOLOFT) 50 mg tablet Take 1 tablet (50 mg total) by mouth daily 30 tablet 5    EPINEPHrine (EpiPen 2-[place]) 0 3 mg/0 3 mL SOAJ Inject 0 3 mL (0 3 mg total) into a muscle once for 1 dose 0 6 mL 0     No current facility-administered medications for this visit  She is allergic to bee venom; other; penicillins; and flu virus vaccine       Review of Systems   Constitutional: Negative  HENT: Negative  Respiratory: Negative  Cardiovascular: Positive for leg swelling  Genitourinary:        7/27/20   Neurological: Positive for seizures and headaches  Psychiatric/Behavioral: Negative  /86   Pulse 91   Resp 18   Ht 5' 1" (1 549 m)   Wt 135 kg (298 lb)   SpO2 97%   BMI 56 31 kg/m²     Objective:     Physical Exam  Vitals signs and nursing note reviewed  Constitutional:       General: She is not in acute distress  Appearance: She is well-developed  She is obese  She is not ill-appearing, toxic-appearing or diaphoretic  HENT:      Head: Normocephalic and atraumatic  Eyes:      Extraocular Movements: Extraocular movements intact  Conjunctiva/sclera: Conjunctivae normal       Pupils: Pupils are equal, round, and reactive to light  Neck:      Musculoskeletal: Neck supple  Cardiovascular:      Rate and Rhythm: Normal rate and regular rhythm  Heart sounds: Normal heart sounds  No murmur  Pulmonary:      Effort: Pulmonary effort is normal  No respiratory distress  Breath sounds: Normal breath sounds  No wheezing or rales  Chest:      Chest wall: No tenderness  Musculoskeletal:      Comments: Presence of hemiparesis on right side  Right upper extremity 3/5 strength and right lower extremity 3/5 strength  Left upper and left lower extremity 5/5 strength  Neurological:      Mental Status: She is alert and oriented to person, place, and time  Psychiatric:         Mood and Affect: Mood normal          Behavior: Behavior normal          Thought Content:  Thought content normal          Judgment: Judgment normal          Results for orders placed or performed in visit on 08/31/20   POCT urine HCG   Result Value Ref Range    URINE HCG negative

## 2020-09-01 ENCOUNTER — TELEPHONE (OUTPATIENT)
Dept: NEUROLOGY | Facility: CLINIC | Age: 25
End: 2020-09-01

## 2020-09-01 NOTE — TELEPHONE ENCOUNTER
Best contact number for [id number]:[phone]    Emergency Contact name and number:  Rosetta Blanc [phone]  Referring provider and telephone number:  Flori Litten [phone]  Primary Care Provider Name and if affiliated with [address]:   Bola Christianson  Reason for Appointment/Dx:  seizures  Neurology Location patient would like to be seen:  [place]  Order received? Yes                                                 Records Received? Yes    Have you ever seen another Neurologist?       No    Insurance [id number] [facility]     ID/Policy #:[id number]    Secondary Insurance:    ID/Policy#: Workman's Comp/ Accident/ School  Information      Workman's Comp/Accident/School related?        No    If yes name of Insurance company:    Date of Injury:    Type of Injury:    [address] Name and Telephone Number:    Notes:                   Appointment date:   11/9/2020

## 2020-09-21 ENCOUNTER — APPOINTMENT (OUTPATIENT)
Dept: LAB | Facility: HOSPITAL | Age: 25
End: 2020-09-21
Payer: COMMERCIAL

## 2020-09-21 DIAGNOSIS — I10 ESSENTIAL HYPERTENSION: ICD-10-CM

## 2020-09-21 LAB
ALBUMIN SERPL BCP-MCNC: 3 G/DL (ref 3.5–5)
ALP SERPL-CCNC: 81 U/L (ref 46–116)
ALT SERPL W P-5'-P-CCNC: 33 U/L (ref 12–78)
ANION GAP SERPL CALCULATED.3IONS-SCNC: 8 MMOL/L (ref 4–13)
AST SERPL W P-5'-P-CCNC: 22 U/L (ref 5–45)
BASOPHILS # BLD AUTO: 0.05 THOUSANDS/ΜL (ref 0–0.1)
BASOPHILS NFR BLD AUTO: 1 % (ref 0–1)
BILIRUB SERPL-MCNC: 0.3 MG/DL (ref 0.2–1)
BUN SERPL-MCNC: 13 MG/DL (ref 5–25)
CALCIUM ALBUM COR SERPL-MCNC: 9.5 MG/DL (ref 8.3–10.1)
CALCIUM SERPL-MCNC: 8.7 MG/DL (ref 8.3–10.1)
CHLORIDE SERPL-SCNC: 105 MMOL/L (ref 100–108)
CO2 SERPL-SCNC: 28 MMOL/L (ref 21–32)
CREAT SERPL-MCNC: 0.59 MG/DL (ref 0.6–1.3)
EOSINOPHIL # BLD AUTO: 0.31 THOUSAND/ΜL (ref 0–0.61)
EOSINOPHIL NFR BLD AUTO: 4 % (ref 0–6)
ERYTHROCYTE [DISTWIDTH] IN BLOOD BY AUTOMATED COUNT: 14.2 % (ref 11.6–15.1)
GFR SERPL CREATININE-BSD FRML MDRD: 129 ML/MIN/1.73SQ M
GLUCOSE SERPL-MCNC: 108 MG/DL (ref 65–140)
HCT VFR BLD AUTO: 40.1 % (ref 34.8–46.1)
HGB BLD-MCNC: 12.4 G/DL (ref 11.5–15.4)
IMM GRANULOCYTES # BLD AUTO: 0.06 THOUSAND/UL (ref 0–0.2)
IMM GRANULOCYTES NFR BLD AUTO: 1 % (ref 0–2)
LYMPHOCYTES # BLD AUTO: 2.14 THOUSANDS/ΜL (ref 0.6–4.47)
LYMPHOCYTES NFR BLD AUTO: 24 % (ref 14–44)
MCH RBC QN AUTO: 24.9 PG (ref 26.8–34.3)
MCHC RBC AUTO-ENTMCNC: 30.9 G/DL (ref 31.4–37.4)
MCV RBC AUTO: 81 FL (ref 82–98)
MONOCYTES # BLD AUTO: 0.48 THOUSAND/ΜL (ref 0.17–1.22)
MONOCYTES NFR BLD AUTO: 5 % (ref 4–12)
NEUTROPHILS # BLD AUTO: 5.78 THOUSANDS/ΜL (ref 1.85–7.62)
NEUTS SEG NFR BLD AUTO: 65 % (ref 43–75)
NRBC BLD AUTO-RTO: 0 /100 WBCS
PLATELET # BLD AUTO: 429 THOUSANDS/UL (ref 149–390)
PMV BLD AUTO: 8.7 FL (ref 8.9–12.7)
POTASSIUM SERPL-SCNC: 3.6 MMOL/L (ref 3.5–5.3)
PROT SERPL-MCNC: 7.9 G/DL (ref 6.4–8.2)
RBC # BLD AUTO: 4.98 MILLION/UL (ref 3.81–5.12)
SODIUM SERPL-SCNC: 141 MMOL/L (ref 136–145)
TSH SERPL DL<=0.05 MIU/L-ACNC: 1.56 UIU/ML (ref 0.36–3.74)
WBC # BLD AUTO: 8.82 THOUSAND/UL (ref 4.31–10.16)

## 2020-09-21 PROCEDURE — 84443 ASSAY THYROID STIM HORMONE: CPT

## 2020-09-21 PROCEDURE — 85025 COMPLETE CBC W/AUTO DIFF WBC: CPT

## 2020-09-21 PROCEDURE — 80053 COMPREHEN METABOLIC PANEL: CPT

## 2020-09-21 PROCEDURE — 36415 COLL VENOUS BLD VENIPUNCTURE: CPT

## 2020-09-24 ENCOUNTER — TELEPHONE (OUTPATIENT)
Dept: NEUROLOGY | Facility: CLINIC | Age: 25
End: 2020-09-24

## 2020-09-24 NOTE — TELEPHONE ENCOUNTER
Left message for patient to come in for a sooner appointment   Dr Eriberto Lamas has an opening tomorrow @11:30am

## 2020-09-29 ENCOUNTER — TELEPHONE (OUTPATIENT)
Dept: FAMILY MEDICINE CLINIC | Facility: CLINIC | Age: 25
End: 2020-09-29

## 2020-09-29 NOTE — TELEPHONE ENCOUNTER
Rusty Conde called back and verbally asked to put him on her contact list to speak to  He is on the patient contacts

## 2020-09-29 NOTE — TELEPHONE ENCOUNTER
Esau called for Pt, I did not see his name on Communication Consent  He asked if Patient can have Iron and Multivitamin  I responded I will send a Note to her Provider   someone will give the patient a called back /   Please advise, Thank you

## 2020-09-29 NOTE — TELEPHONE ENCOUNTER
Esau called again in regard to these medications  They are requesting a call back once they are sent to the pharmacy

## 2020-09-30 DIAGNOSIS — R56.9 SEIZURE (HCC): ICD-10-CM

## 2020-09-30 DIAGNOSIS — Z86.718 HISTORY OF DEEP VEIN THROMBOSIS (DVT) DURING PREGNANCY: ICD-10-CM

## 2020-09-30 DIAGNOSIS — F31.60 BIPOLAR AFFECTIVE DISORDER, CURRENT EPISODE MIXED, CURRENT EPISODE SEVERITY UNSPECIFIED (HCC): Primary | ICD-10-CM

## 2020-09-30 DIAGNOSIS — Z87.59 HISTORY OF DEEP VEIN THROMBOSIS (DVT) DURING PREGNANCY: ICD-10-CM

## 2020-09-30 RX ORDER — FERROUS SULFATE TAB EC 324 MG (65 MG FE EQUIVALENT) 324 (65 FE) MG
324 TABLET DELAYED RESPONSE ORAL
Qty: 90 TABLET | Refills: 1 | Status: SHIPPED | OUTPATIENT
Start: 2020-09-30 | End: 2021-03-31

## 2020-09-30 RX ORDER — VIT B COMP NO.3/FOLIC/C/BIOTIN 1 MG-60 MG
1 TABLET ORAL
Qty: 90 TABLET | Refills: 1 | Status: SHIPPED | OUTPATIENT
Start: 2020-09-30 | End: 2021-03-31

## 2020-10-02 NOTE — TELEPHONE ENCOUNTER
Spoke with patient and informed the patient, which the patient has no further questions at this time

## 2020-10-08 ENCOUNTER — TELEPHONE (OUTPATIENT)
Dept: FAMILY MEDICINE CLINIC | Facility: CLINIC | Age: 25
End: 2020-10-08

## 2020-11-04 ENCOUNTER — TELEPHONE (OUTPATIENT)
Dept: NEUROLOGY | Facility: CLINIC | Age: 25
End: 2020-11-04

## 2020-12-16 DIAGNOSIS — F31.60 BIPOLAR AFFECTIVE DISORDER, CURRENT EPISODE MIXED, CURRENT EPISODE SEVERITY UNSPECIFIED (HCC): ICD-10-CM

## 2021-02-22 DIAGNOSIS — F31.60 BIPOLAR AFFECTIVE DISORDER, CURRENT EPISODE MIXED, CURRENT EPISODE SEVERITY UNSPECIFIED (HCC): ICD-10-CM

## 2021-03-31 DIAGNOSIS — F31.60 BIPOLAR AFFECTIVE DISORDER, CURRENT EPISODE MIXED, CURRENT EPISODE SEVERITY UNSPECIFIED (HCC): ICD-10-CM

## 2021-03-31 DIAGNOSIS — I10 ESSENTIAL HYPERTENSION: Primary | ICD-10-CM

## 2021-03-31 DIAGNOSIS — Z87.59 HISTORY OF DEEP VEIN THROMBOSIS (DVT) DURING PREGNANCY: ICD-10-CM

## 2021-03-31 DIAGNOSIS — Z86.718 HISTORY OF DEEP VEIN THROMBOSIS (DVT) DURING PREGNANCY: ICD-10-CM

## 2021-03-31 DIAGNOSIS — G81.91 RIGHT HEMIPARESIS (HCC): ICD-10-CM

## 2021-03-31 DIAGNOSIS — R56.9 SEIZURE (HCC): ICD-10-CM

## 2021-03-31 RX ORDER — VIT B COMP NO.3/FOLIC/C/BIOTIN 1 MG-60 MG
TABLET ORAL
Qty: 90 TABLET | Refills: 0 | Status: SHIPPED | OUTPATIENT
Start: 2021-03-31

## 2021-03-31 RX ORDER — FERROUS SULFATE TAB EC 324 MG (65 MG FE EQUIVALENT) 324 (65 FE) MG
324 TABLET DELAYED RESPONSE ORAL
Qty: 90 TABLET | Refills: 0 | Status: SHIPPED | OUTPATIENT
Start: 2021-03-31 | End: 2021-08-11 | Stop reason: SDUPTHER

## 2021-05-07 DIAGNOSIS — F31.60 BIPOLAR AFFECTIVE DISORDER, CURRENT EPISODE MIXED, CURRENT EPISODE SEVERITY UNSPECIFIED (HCC): ICD-10-CM

## 2021-05-12 ENCOUNTER — OFFICE VISIT (OUTPATIENT)
Dept: FAMILY MEDICINE CLINIC | Facility: CLINIC | Age: 26
End: 2021-05-12
Payer: COMMERCIAL

## 2021-05-12 VITALS
HEART RATE: 88 BPM | TEMPERATURE: 97.7 F | OXYGEN SATURATION: 93 % | DIASTOLIC BLOOD PRESSURE: 78 MMHG | RESPIRATION RATE: 19 BRPM | BODY MASS INDEX: 55.32 KG/M2 | SYSTOLIC BLOOD PRESSURE: 128 MMHG | HEIGHT: 61 IN | WEIGHT: 293 LBS

## 2021-05-12 DIAGNOSIS — Z12.4 CERVICAL CANCER SCREENING: ICD-10-CM

## 2021-05-12 DIAGNOSIS — F31.60 BIPOLAR AFFECTIVE DISORDER, CURRENT EPISODE MIXED, CURRENT EPISODE SEVERITY UNSPECIFIED (HCC): ICD-10-CM

## 2021-05-12 DIAGNOSIS — R21 RASH: Primary | ICD-10-CM

## 2021-05-12 DIAGNOSIS — I10 ESSENTIAL HYPERTENSION: ICD-10-CM

## 2021-05-12 PROCEDURE — 3008F BODY MASS INDEX DOCD: CPT | Performed by: FAMILY MEDICINE

## 2021-05-12 PROCEDURE — 3725F SCREEN DEPRESSION PERFORMED: CPT | Performed by: FAMILY MEDICINE

## 2021-05-12 PROCEDURE — 1036F TOBACCO NON-USER: CPT | Performed by: FAMILY MEDICINE

## 2021-05-12 PROCEDURE — 99214 OFFICE O/P EST MOD 30 MIN: CPT | Performed by: FAMILY MEDICINE

## 2021-05-12 PROCEDURE — 3074F SYST BP LT 130 MM HG: CPT | Performed by: FAMILY MEDICINE

## 2021-05-12 PROCEDURE — 3078F DIAST BP <80 MM HG: CPT | Performed by: FAMILY MEDICINE

## 2021-05-12 RX ORDER — LOXAPINE SUCCINATE 50 MG/1
1 TABLET ORAL
COMMUNITY
Start: 2021-05-10 | End: 2021-08-04 | Stop reason: CLARIF

## 2021-05-12 RX ORDER — LOXAPINE SUCCINATE 25 MG/1
1 TABLET ORAL DAILY
COMMUNITY
Start: 2021-05-10 | End: 2021-08-04 | Stop reason: CLARIF

## 2021-05-12 RX ORDER — QUETIAPINE FUMARATE 400 MG/1
1 TABLET, FILM COATED ORAL
COMMUNITY
Start: 2021-05-07 | End: 2021-08-04 | Stop reason: CLARIF

## 2021-05-12 RX ORDER — LISINOPRIL 5 MG/1
5 TABLET ORAL DAILY
Qty: 90 TABLET | Refills: 1 | Status: SHIPPED | OUTPATIENT
Start: 2021-05-12 | End: 2021-06-30

## 2021-05-12 RX ORDER — TRIAMCINOLONE ACETONIDE 1 MG/G
CREAM TOPICAL 2 TIMES DAILY
Qty: 30 G | Refills: 0 | Status: SHIPPED | OUTPATIENT
Start: 2021-05-12 | End: 2021-08-04 | Stop reason: CLARIF

## 2021-05-12 NOTE — PROGRESS NOTES
BMI Counseling: Body mass index is 57 63 kg/m²  The BMI is above normal  Nutrition recommendations include decreasing portion sizes, decreasing fast food intake, limiting drinks that contain sugar, moderation in carbohydrate intake, increasing intake of lean protein, reducing intake of saturated and trans fat and reducing intake of cholesterol  Exercise recommendations include moderate physical activity 150 minutes/week and exercising 3-5 times per week  No pharmacotherapy was ordered  Assessment/Plan:     Chronic Problems:  No problem-specific Assessment & Plan notes found for this encounter  Visit Diagnosis:  Diagnoses and all orders for this visit:    Rash  -     triamcinolone (KENALOG) 0 1 % cream; Apply topically 2 (two) times a day    Cervical cancer screening  -     Ambulatory referral to Gynecology; Future    Essential hypertension  -     lisinopril (ZESTRIL) 5 mg tablet; Take 1 tablet (5 mg total) by mouth daily    Bipolar affective disorder, current episode mixed, [facility] severity unspecified ([facility] )    Other orders  -     QUEtiapine (SEROquel) 400 MG tablet; Take 1 tablet by mouth daily at bedtime  -     loxapine (LOXITANE) 50 MG capsule; Take 1 capsule by mouth daily at bedtime  -     loxapine (LOXITANE) 25 mg capsule; Take 1 capsule by mouth daily          Subjective:    Patient ID: Chet Unger is a 26 y o  female     htn   Under care of psych , md booker , not sure of the dx   Has been changing meds , for effectiveness  htn   zestril taking daily , needs refill   Negative chest pain palpitations shortness of breath difficulty breathing cough lesions rash   understands need for weight loss program  Rash right side of abd , has been a bit itching unsure of cause    approximately 3 days    Medication Refill  Associated symptoms include a rash   Pertinent negatives include no abdominal pain, arthralgias, chest pain, chills, congestion, coughing, fever, headaches, joint swelling, myalgias, nausea, numbness, sore throat, vomiting or weakness  Rash  Pertinent negatives include no congestion, cough, diarrhea, fever, rhinorrhea, shortness of breath, sore throat or vomiting  The following portions of the patient's history were reviewed and updated as appropriate: allergies, current medications, past family history, past medical history, past social history, past surgical history and problem list     Review of Systems   Constitutional: Negative for appetite change, chills, fever and unexpected weight change  HENT: Negative for congestion, dental problem, ear pain, hearing loss, postnasal drip, rhinorrhea, sinus pressure, sinus pain, sneezing, sore throat, tinnitus and voice change  Eyes: Negative for visual disturbance  Respiratory: Negative for apnea, cough, chest tightness and shortness of breath  Cardiovascular: Negative for chest pain, palpitations and leg swelling  Gastrointestinal: Negative for abdominal pain, blood in stool, constipation, diarrhea, nausea and vomiting  Endocrine: Negative for cold intolerance, heat intolerance, polydipsia, polyphagia and polyuria  Genitourinary: Negative for decreased urine volume, difficulty urinating, dysuria, frequency and hematuria  Musculoskeletal: Negative for arthralgias, back pain, gait problem, joint swelling and myalgias  Skin: Positive for rash  Negative for color change and wound  Allergic/Immunologic: Negative for environmental allergies and food allergies  Neurological: Negative for dizziness, syncope, weakness, light-headedness, numbness and headaches  Hematological: Negative for adenopathy  Does not bruise/bleed easily  Psychiatric/Behavioral: Negative for sleep disturbance and suicidal ideas  The patient is not nervous/anxious            /78   Pulse 88   Temp 97 7 °F (36 5 °C)   Resp 19   Ht 5' 1" (1 549 m)   Wt (!) 138 kg (305 lb)   SpO2 93%   BMI 57 63 kg/m²   Social History Socioeconomic History    Marital status: /Civil Union     Spouse name: Not on file    Number of children: Not on file    Years of education: Not on file    Highest education level: Not on file   Occupational History    Not on file   Social Needs    Financial resource strain: Not on file    Food insecurity     Worry: Not on file     Inability: Not on file    Transportation needs     Medical: Not on file     Non-medical: Not on file   Tobacco Use    Smoking status: Former Smoker    Smokeless tobacco: Never Used   Substance and Sexual Activity    Alcohol use: No    Drug use: No    Sexual activity: Yes     Partners: Male   Lifestyle    Physical activity     Days per week: Not on file     Minutes per session: Not on file    Stress: Not on file   Relationships    Social connections     Talks on phone: Not on file     Gets together: Not on file     Attends [employer] service: Not on file     Active member of club or organization: Not on file     Attends meetings of clubs or organizations: Not on file     Relationship status: Not on file    Intimate partner violence     Fear of current or ex partner: Not on file     Emotionally abused: Not on file     Physically abused: Not on file     Forced sexual activity: Not on file   Other Topics Concern    Not on file   Social History Narrative    Denied caffeine use    Child in foster care    Engaged to be  (as per [employer])     Former smoker (as per [employer])    Never a smoker (as per [employer])      Past Medical History:   Diagnosis Date    Anxiety     takes Zoloft; stopped during pregnancy     [facility] affective disorder [facility] ) 6/24/2017    Cardiac asystole ([facility] )     Chronic hypertension with superimposed preeclampsia 6/24/2017    Depression     [facility]    LLE-post cholecystectomy    Intrauterine growth restriction affecting care of mother, antepartum, third trimester, fetus 1     wi9th prior preg    Migraine     Multiple gestation     Right hemiparesis ([facility] )     stroke in utero    Seizures ([facility] )     in 2016    Severe preeclampsia, third trimester     Spontaneous      seven times    Varicella     in childhood      Family History   Problem Relation Age of Onset    Pulmonary embolism Mother     Deep vein thrombosis Mother     Depression Mother     Hypertension Mother     Thyroid disease Mother     Mental illness Mother         bipolar    Diabetes Maternal Grandmother     Deep vein thrombosis Father     Pulmonary embolism Father     Depression Sister     Heart disease Sister     Mental illness Sister         bipolar schizo     Past Surgical History:   Procedure Laterality Date     SECTION      x3    CHOLECYSTECTOMY      IA  DELIVERY ONLY N/A 2017    2015 daughter 32 weeks, 2016 son, 2017 twin son and daughter, 31 weeks    IA  DELIVERY ONLY N/A 2018    Procedure:  SECTION () REPEAT;  Surgeon: Karie Portillo MD;  [facility]: [facility] ;  Service: Obstetrics    IA LIGATION,FALLOPIAN TUBE W/ Bilateral 2018    Procedure: LIGATION/COAGULATION TUBAL;  Surgeon: Karie Portillo MD;  Location: [facility] ;  Service: Obstetrics       Current Outpatient Medications:     B Complex-C-Folic Acid ([facility]) 1 MG TABS, TAKE 1 TABLET BY MOUTH DAILY WITH BREAKFAST, Disp: 90 tablet, Rfl: 0    ferrous sulfate 324 (65 Fe) mg, TAKE 1 TABLET (324 MG TOTAL) BY MOUTH DAILY BEFORE BREAKFAST, Disp: 90 tablet, Rfl: 0    lisinopril (ZESTRIL) 5 mg tablet, Take 1 tablet (5 mg total) by mouth daily, Disp: 90 tablet, Rfl: 1    loxapine (LOXITANE) 25 mg capsule, Take 1 capsule by mouth daily, Disp: , Rfl:     loxapine (LOXITANE) 50 MG capsule, Take 1 capsule by mouth daily at bedtime, Disp: , Rfl:     methocarbamol (ROBAXIN) 500 mg tablet, Take 1 tablet (500 mg total) by mouth 2 (two) times a day as needed for muscle spasms, Disp: 30 tablet, Rfl: 0    QUEtiapine (SEROquel) 400 MG tablet, Take 1 tablet by mouth daily at bedtime, Disp: , Rfl:     sertraline (ZOLOFT) 50 mg tablet, TAKE 1 TABLET (50 MG TOTAL) BY MOUTH DAILY, Disp: 30 tablet, Rfl: 3    EPINEPHrine (EpiPen 2-[place]) 0 3 mg/0 3 mL SOAJ, Inject 0 3 mL (0 3 mg total) into a muscle once for 1 dose, Disp: 0 6 mL, Rfl: 0    triamcinolone (KENALOG) 0 1 % cream, Apply topically 2 (two) times a day, Disp: 30 g, Rfl: 0    Allergies   Allergen Reactions    Bee Venom Anaphylaxis    Other Anaphylaxis and Swelling     Honey     Penicillins Anaphylaxis and Swelling    Flu Virus Vaccine Rash          Lab Review   No visits with results within 6 Month(s) from this visit     Latest known visit with results is:   Appointment on 09/21/2020   Component Date Value    WBC 09/21/2020 8 82     RBC 09/21/2020 4 98     Hemoglobin 09/21/2020 12 4     Hematocrit 09/21/2020 40 1     MCV 09/21/2020 81*    MCH 09/21/2020 24 9*    MCHC 09/21/2020 30 9*    RDW 09/21/2020 14 2     MPV 09/21/2020 8 7*    Platelets 66/65/0647 429*    nRBC 09/21/2020 0     Neutrophils Relative 09/21/2020 65     Immat GRANS % 09/21/2020 1     Lymphocytes Relative 09/21/2020 24     Monocytes Relative 09/21/2020 5     Eosinophils Relative 09/21/2020 4     Basophils Relative 09/21/2020 1     Neutrophils Absolute 09/21/2020 5 78     Immature Grans Absolute 09/21/2020 0 06     Lymphocytes Absolute 09/21/2020 2 14     Monocytes Absolute 09/21/2020 0 48     Eosinophils Absolute 09/21/2020 0 31     Basophils Absolute 09/21/2020 0 05     Sodium 09/21/2020 141     Potassium 09/21/2020 3 6     Chloride 09/21/2020 105     CO2 09/21/2020 28     ANION GAP 09/21/2020 8     BUN 09/21/2020 13     Creatinine 09/21/2020 0 59*    Glucose 09/21/2020 108     Calcium 09/21/2020 8 7     Corrected Calcium 09/21/2020 9 5     AST 09/21/2020 22     ALT 09/21/2020 33     Alkaline Phosphatase 09/21/2020 81     Total Protein 09/21/2020 7 9     Albumin 09/21/2020 3 0*    Total Bilirubin 09/21/2020 0 30     eGFR 09/21/2020 129     TSH 3RD GENERATON 09/21/2020 1 564         Imaging: No results found  Objective:     Physical Exam  Constitutional:       General: She is not in acute distress  Appearance: She is well-developed  She is not ill-appearing  HENT:      Head: Normocephalic and atraumatic  Neck:      Musculoskeletal: Normal range of motion and neck supple  Cardiovascular:      Rate and Rhythm: Normal rate and regular rhythm  Heart sounds: Normal heart sounds  Pulmonary:      Effort: Pulmonary effort is normal       Breath sounds: Normal breath sounds  Musculoskeletal: Normal range of motion  Skin:     General: Skin is warm and dry  Findings: Rash ( left lateral abdomen linea pruritic non pustular nonvesicular approximately 4-6 cm) present  Neurological:      Mental Status: She is alert and oriented to person, place, and time  Deep Tendon Reflexes: Reflexes are normal and symmetric  Psychiatric:         Behavior: Behavior normal          Thought Content: Thought content normal          Judgment: Judgment normal            There are no Patient Instructions on file for this visit  DOV Martinez    Portions of the record may have been created with voice recognition software  Occasional wrong word or "sound a like" substitutions may have occurred due to the inherent limitations of voice recognition software  Read the chart carefully and recognize, using context, where substitutions have occurred

## 2021-06-30 DIAGNOSIS — I10 ESSENTIAL HYPERTENSION: ICD-10-CM

## 2021-06-30 RX ORDER — LISINOPRIL 5 MG/1
5 TABLET ORAL DAILY
Qty: 90 TABLET | Refills: 1 | Status: SHIPPED | OUTPATIENT
Start: 2021-06-30 | End: 2022-01-03

## 2021-06-30 NOTE — TELEPHONE ENCOUNTER
Patient called today for an appointment for her swollen foot/leg  Keiry Prater was Discharged from the practice for No-Show appointments  Patient had over 3 No-Shows and the policy leads to a Discharge  I recommended the patient seek Urgent care or ER  Patients  then called the practice back and tried to argue  that they have no transportation  We were not aware of this at the time and unfortunately he is going to seek a  and said he will see me and Celestina Foot in court  I recommended the ER again for kennedy and call was ended       Thank you  Chet Malloy

## 2021-08-03 ENCOUNTER — APPOINTMENT (EMERGENCY)
Dept: CT IMAGING | Facility: HOSPITAL | Age: 26
End: 2021-08-03
Payer: COMMERCIAL

## 2021-08-03 ENCOUNTER — APPOINTMENT (EMERGENCY)
Dept: RADIOLOGY | Facility: HOSPITAL | Age: 26
End: 2021-08-03
Payer: COMMERCIAL

## 2021-08-03 ENCOUNTER — HOSPITAL ENCOUNTER (OUTPATIENT)
Facility: HOSPITAL | Age: 26
Setting detail: OBSERVATION
Discharge: HOME | End: 2021-08-04
Attending: EMERGENCY MEDICINE | Admitting: INTERNAL MEDICINE
Payer: COMMERCIAL

## 2021-08-03 ENCOUNTER — APPOINTMENT (EMERGENCY)
Dept: ULTRASOUND IMAGING | Facility: HOSPITAL | Age: 26
End: 2021-08-03
Payer: COMMERCIAL

## 2021-08-03 DIAGNOSIS — F31.60 BIPOLAR AFFECTIVE DISORDER, CURRENT EPISODE MIXED, CURRENT EPISODE SEVERITY UNSPECIFIED (HCC): ICD-10-CM

## 2021-08-03 DIAGNOSIS — R55 SYNCOPE: ICD-10-CM

## 2021-08-03 DIAGNOSIS — R07.9 CHEST PAIN, UNSPECIFIED: Primary | ICD-10-CM

## 2021-08-03 DIAGNOSIS — E87.6 HYPOKALEMIA: ICD-10-CM

## 2021-08-03 PROBLEM — R07.89 OTHER CHEST PAIN: Status: ACTIVE | Noted: 2021-08-03

## 2021-08-03 PROBLEM — E66.01 MORBID OBESITY WITH BMI OF 50.0-59.9, ADULT (HCC): Status: ACTIVE | Noted: 2021-08-03

## 2021-08-03 PROBLEM — M79.89 LEG SWELLING: Status: ACTIVE | Noted: 2021-08-03

## 2021-08-03 LAB
ALBUMIN SERPL BCP-MCNC: 3 G/DL (ref 3.5–5)
ALP SERPL-CCNC: 84 U/L (ref 46–116)
ALT SERPL W P-5'-P-CCNC: 28 U/L (ref 12–78)
ANION GAP SERPL CALCULATED.3IONS-SCNC: 8 MMOL/L (ref 4–13)
APTT PPP: 22 SECONDS (ref 23–37)
AST SERPL W P-5'-P-CCNC: 23 U/L (ref 5–45)
B-HCG SERPL-ACNC: <2 MIU/ML
BASOPHILS # BLD AUTO: 0.04 THOUSANDS/ΜL (ref 0–0.1)
BASOPHILS NFR BLD AUTO: 0 % (ref 0–1)
BILIRUB DIRECT SERPL-MCNC: 0.09 MG/DL (ref 0–0.2)
BILIRUB SERPL-MCNC: 0.38 MG/DL (ref 0.2–1)
BUN SERPL-MCNC: 9 MG/DL (ref 5–25)
CALCIUM SERPL-MCNC: 7.5 MG/DL (ref 8.3–10.1)
CHLORIDE SERPL-SCNC: 106 MMOL/L (ref 100–108)
CO2 SERPL-SCNC: 24 MMOL/L (ref 21–32)
CREAT SERPL-MCNC: 0.51 MG/DL (ref 0.6–1.3)
D DIMER PPP FEU-MCNC: 0.71 UG/ML FEU
EOSINOPHIL # BLD AUTO: 0.17 THOUSAND/ΜL (ref 0–0.61)
EOSINOPHIL NFR BLD AUTO: 1 % (ref 0–6)
ERYTHROCYTE [DISTWIDTH] IN BLOOD BY AUTOMATED COUNT: 14.6 % (ref 11.6–15.1)
GFR SERPL CREATININE-BSD FRML MDRD: 134 ML/MIN/1.73SQ M
GLUCOSE SERPL-MCNC: 76 MG/DL (ref 65–140)
HCT VFR BLD AUTO: 40.2 % (ref 34.8–46.1)
HGB BLD-MCNC: 12.8 G/DL (ref 11.5–15.4)
IMM GRANULOCYTES # BLD AUTO: 0.08 THOUSAND/UL (ref 0–0.2)
IMM GRANULOCYTES NFR BLD AUTO: 1 % (ref 0–2)
INR PPP: 1.01 (ref 0.84–1.19)
LYMPHOCYTES # BLD AUTO: 2.58 THOUSANDS/ΜL (ref 0.6–4.47)
LYMPHOCYTES NFR BLD AUTO: 19 % (ref 14–44)
MCH RBC QN AUTO: 26 PG (ref 26.8–34.3)
MCHC RBC AUTO-ENTMCNC: 31.8 G/DL (ref 31.4–37.4)
MCV RBC AUTO: 82 FL (ref 82–98)
MONOCYTES # BLD AUTO: 0.5 THOUSAND/ΜL (ref 0.17–1.22)
MONOCYTES NFR BLD AUTO: 4 % (ref 4–12)
NEUTROPHILS # BLD AUTO: 10.48 THOUSANDS/ΜL (ref 1.85–7.62)
NEUTS SEG NFR BLD AUTO: 75 % (ref 43–75)
NRBC BLD AUTO-RTO: 0 /100 WBCS
NT-PROBNP SERPL-MCNC: 39 PG/ML
PLATELET # BLD AUTO: 375 THOUSANDS/UL (ref 149–390)
PMV BLD AUTO: 9.7 FL (ref 8.9–12.7)
POTASSIUM SERPL-SCNC: 3.1 MMOL/L (ref 3.5–5.3)
PROT SERPL-MCNC: 7 G/DL (ref 6.4–8.2)
PROTHROMBIN TIME: 13.5 SECONDS (ref 11.6–14.5)
RBC # BLD AUTO: 4.92 MILLION/UL (ref 3.81–5.12)
SODIUM SERPL-SCNC: 138 MMOL/L (ref 136–145)
TROPONIN I SERPL-MCNC: <0.02 NG/ML
WBC # BLD AUTO: 13.85 THOUSAND/UL (ref 4.31–10.16)

## 2021-08-03 PROCEDURE — 80048 BASIC METABOLIC PNL TOTAL CA: CPT | Performed by: PHYSICIAN ASSISTANT

## 2021-08-03 PROCEDURE — 85025 COMPLETE CBC W/AUTO DIFF WBC: CPT | Performed by: PHYSICIAN ASSISTANT

## 2021-08-03 PROCEDURE — 85730 THROMBOPLASTIN TIME PARTIAL: CPT | Performed by: PHYSICIAN ASSISTANT

## 2021-08-03 PROCEDURE — 80076 HEPATIC FUNCTION PANEL: CPT | Performed by: PHYSICIAN ASSISTANT

## 2021-08-03 PROCEDURE — 93971 EXTREMITY STUDY: CPT

## 2021-08-03 PROCEDURE — 83880 ASSAY OF NATRIURETIC PEPTIDE: CPT | Performed by: PHYSICIAN ASSISTANT

## 2021-08-03 PROCEDURE — 71275 CT ANGIOGRAPHY CHEST: CPT

## 2021-08-03 PROCEDURE — 84702 CHORIONIC GONADOTROPIN TEST: CPT | Performed by: PHYSICIAN ASSISTANT

## 2021-08-03 PROCEDURE — 85610 PROTHROMBIN TIME: CPT | Performed by: PHYSICIAN ASSISTANT

## 2021-08-03 PROCEDURE — 99220 PR INITIAL OBSERVATION CARE/DAY 70 MINUTES: CPT | Performed by: INTERNAL MEDICINE

## 2021-08-03 PROCEDURE — 85379 FIBRIN DEGRADATION QUANT: CPT | Performed by: PHYSICIAN ASSISTANT

## 2021-08-03 PROCEDURE — G1004 CDSM NDSC: HCPCS

## 2021-08-03 PROCEDURE — 99285 EMERGENCY DEPT VISIT HI MDM: CPT

## 2021-08-03 PROCEDURE — 84484 ASSAY OF TROPONIN QUANT: CPT | Performed by: PHYSICIAN ASSISTANT

## 2021-08-03 PROCEDURE — 99285 EMERGENCY DEPT VISIT HI MDM: CPT | Performed by: PHYSICIAN ASSISTANT

## 2021-08-03 PROCEDURE — 71046 X-RAY EXAM CHEST 2 VIEWS: CPT

## 2021-08-03 PROCEDURE — 36415 COLL VENOUS BLD VENIPUNCTURE: CPT | Performed by: PHYSICIAN ASSISTANT

## 2021-08-03 RX ORDER — POTASSIUM CHLORIDE 20 MEQ/1
40 TABLET, EXTENDED RELEASE ORAL ONCE
Status: COMPLETED | OUTPATIENT
Start: 2021-08-03 | End: 2021-08-03

## 2021-08-03 RX ADMIN — IOHEXOL 100 ML: 350 INJECTION, SOLUTION INTRAVENOUS at 17:52

## 2021-08-03 RX ADMIN — POTASSIUM CHLORIDE 40 MEQ: 1500 TABLET, EXTENDED RELEASE ORAL at 17:53

## 2021-08-03 NOTE — LETTER
[address] [address]  Dept: [phone]    August 4, 2021     Patient: Haley Philippe   YOB: 1995   Date of Visit: 8/3/2021       To Whom it May Concern:    Haley Philippe is under my professional care  She was seen in the hospital from 8/3/2021   to 08/04/21  She may return to work on Friday, August 6th, 2021 without limitations  If you have any questions or concerns, please don't hesitate to call           Sincerely,          Carina Romo PA-C

## 2021-08-03 NOTE — LETTER
600 [facility]  [address]  [address] [place] [id number]  Dept: [phone]    August 4, 2021     Patient: Dasia Carrera   YOB: 1995   Date of Visit: 8/3/2021       To Whom it May Concern:    Dasia Carrera is under my professional care  She was seen in the hospital from 8/3/2021   to 08/04/21  She may return to work on Friday, August 6th, 2021 without limitations  If you have any questions or concerns, please don't hesitate to call           Sincerely,          Rand Elliott PA-C

## 2021-08-03 NOTE — Clinical Note
Christopher Rainey was seen and treated in our emergency department on 8/3/2021  Diagnosis:     Kristin    She may return on this date: If you have any questions or concerns, please don't hesitate to call        Tip Neville DO    ______________________________           _______________          _______________  Hospital Representative                              Date                                Time

## 2021-08-03 NOTE — ASSESSMENT & PLAN NOTE
 Patient has morbid obesity, hypertension, severe anxiety    She also reports that she has been working 16 hour shifts this week she was recently started on 400 mg of Seroquel in July secondary to some issues with insomnia and has only been able to sleep 4-5 hours at night which could be contributing to some of her syncopal episodes   Patient does not report syncopal episodes after standing she reports that after she has been standing for several hours she starts to feel weak fatigued nauseated and isacc does report poor nutrition   Patient also has very severe anxiety   Initial troponin negative, continue to trend   EKG   Orthostatic vitals   Echocardiogram  · Telemetry

## 2021-08-03 NOTE — ASSESSMENT & PLAN NOTE
History of bilateral lower extremity edema  Bilateral duplex study negative for DVT  Has a history of DVTs  Not maintained on anticoagulation at this time

## 2021-08-03 NOTE — LETTER
600 [facility]  [address]   [address] [address]  Dept: [phone]    August 4, 2021     Patient: Cristopher White   YOB: 1995   Date of Visit: 8/3/2021       To Whom it May Concern:    Cristopher White is under my professional care  She was seen in the hospital from 8/3/2021   to 08/04/21  She may return to work on Friday, August 6th, 2021 without limitations  If you have any questions or concerns, please don't hesitate to call           Sincerely,          Tejal Lipscomb PA-C

## 2021-08-03 NOTE — ASSESSMENT & PLAN NOTE
Reports significant history anxiety which she is seeing Outpatient Psychiatry for however she feels like she is being poorly managed emotional support provided  Initial troponin is negative continue to trend  EKG unremarkable  CTA of chest negative for PE  History significant for morbid obesity and hypertension  Echocardiogram  Hold off on Cardiology consult pending workup

## 2021-08-03 NOTE — ED PROVIDER NOTES
History  Chief Complaint   Patient presents with    Leg Swelling     pt reports R leg swerlling, redness, pain, hx of clots, x 1 week      Henny Pfeiffer is a 31-year-old female with past medical history including hypertension, DVT the left lower extremity, seizure disorder, bipolar disorder, presenting with complaint of bilateral lower extremity pain  The patient reports that she has been experiencing these symptoms over the past week  She states that yesterday evening her right lower extremity had appeared red though this has since resolved but this was concerning for her  The patient states that she feels short of breath x1 week  She reports associated central, nonradiating chest pain  The patient has no appreciable exertional or pleuritic component with regard to her discomfort  The patient reports prior history of DVT in the left lower extremity, stating that she was previously on Lovenox  She states that she has not taken this medication since 2017  The patient denies any known injuries to the legs and has not appreciated any calf asymmetry  She denies recent surgical procedures, prolonged immobilization, recent travel, estrogen use  Reports occasional tobacco use "when stressed " She states that she has had 2 syncopal episodes over the past couple of days while at work, stating that her heart was racing at the time with no palpitations currently  She denies any prior history of syncopal episodes  She denies nausea or episodes of vomiting, abdominal pain, back pain  She denies fevers, chills, sweats, cough, congestion, recent illness or sick contact  She denies recent medication changes  Per review of EMR, the patient had called PCP on 6/30/21 for evaluation of foot pain and swelling to be seen in follow up; however, the patient was discharged from [facility] office for no-show appointments  She states that she now has a new primary care provider but has not yet been seen            Prior to Admission Medications   Prescriptions Last Dose Informant Patient Reported? Taking?    B Complex-C-Folic Acid ([facility]) 1 MG TABS   No No   Sig: TAKE 1 TABLET BY MOUTH DAILY WITH BREAKFAST   EPINEPHrine (EpiPen 2-[place]) 0 3 mg/0 3 mL SOAJ   No No   Sig: Inject 0 3 mL (0 3 mg total) into a muscle once for 1 dose   QUEtiapine (SEROquel) 400 MG tablet   Yes No   Sig: Take 1 tablet by mouth daily at bedtime   ferrous sulfate 324 (65 Fe) mg   No No   Sig: TAKE 1 TABLET (324 MG TOTAL) BY MOUTH DAILY BEFORE BREAKFAST   lisinopril (ZESTRIL) 5 mg tablet   No No   Sig: TAKE 1 TABLET (5 MG TOTAL) BY MOUTH DAILY   loxapine (LOXITANE) 25 mg capsule   Yes No   Sig: Take 1 capsule by mouth daily   loxapine (LOXITANE) 50 MG capsule   Yes No   Sig: Take 1 capsule by mouth daily at bedtime   methocarbamol (ROBAXIN) 500 mg tablet   No No   Sig: Take 1 tablet (500 mg total) by mouth 2 (two) times a day as needed for muscle spasms   sertraline (ZOLOFT) 50 mg tablet   No No   Sig: TAKE 1 TABLET (50 MG TOTAL) BY MOUTH DAILY   triamcinolone (KENALOG) 0 1 % cream   No No   Sig: Apply topically 2 (two) times a day      Facility-Administered Medications: None       Past Medical History:   Diagnosis Date    Anxiety     takes Zoloft; stopped during pregnancy     [facility] 2017    Cardiac asystole (HCC)     Chronic hypertension with superimposed preeclampsia 2017    Depression     DVT (deep venous thrombosis) ([facility] )     LLE-post cholecystectomy    Intrauterine growth restriction affecting care of mother, antepartum, third trimester, fetus 1     wi9th prior preg    Migraine     Multiple gestation     Right hemiparesis (HCC)     stroke in utero    Seizures ([facility] )     in 2016    Severe preeclampsia, third trimester     Spontaneous      seven times    Varicella     in childhood        Past Surgical History:   Procedure Laterality Date     SECTION      x3    CHOLECYSTECTOMY      IN  DELIVERY ONLY N/A 2017    2015 daughter 32 weeks, 2016 son, 2017 twin son and daughter, 31 weeks    [address] N/A 2018    Procedure:  SECTION () REPEAT;  Surgeon: Michelle Martinez MD;  Location: [facility];  Service: Obstetrics    UT LIGATION,FALLOPIAN TUBE W/ Bilateral 2018    Procedure: LIGATION/COAGULATION TUBAL;  Surgeon: Michelle Martinez MD;  Location: [facility] ;  Service: Obstetrics       Family History   Problem Relation Age of Onset    Pulmonary embolism Mother     Deep vein thrombosis Mother     Depression Mother     Hypertension Mother     Thyroid disease Mother     Mental illness Mother         bipolar    Diabetes Maternal Grandmother     Deep vein thrombosis Father     Pulmonary embolism Father     Depression Sister     Heart disease Sister     Mental illness Sister         bipolar schizo     I have reviewed and agree with the history as documented  E-Cigarette/Vaping     E-Cigarette/Vaping Substances     Social History     Tobacco Use    Smoking status: Former Smoker    Smokeless tobacco: Never Used   Substance Use Topics    Alcohol use: No    Drug use: No       Review of Systems   Constitutional: Negative for chills and fever  Respiratory: Positive for shortness of breath  Negative for cough, wheezing and stridor  Cardiovascular: Positive for chest pain and leg swelling  Negative for palpitations  Gastrointestinal: Negative for abdominal pain, nausea and vomiting  Neurological: Positive for syncope  Negative for dizziness and headaches  All other systems reviewed and are negative  Physical Exam  Physical Exam  Vitals and nursing note reviewed  Constitutional:       General: She is not in acute distress  Appearance: Normal appearance  She is well-developed  She is obese  She is not ill-appearing, toxic-appearing or diaphoretic  HENT:      Head: Normocephalic and atraumatic        Right Ear: External ear normal       Left Ear: External ear normal    Eyes:      Conjunctiva/sclera: Conjunctivae normal    Cardiovascular:      Rate and Rhythm: Normal rate and regular rhythm  Pulmonary:      Effort: Pulmonary effort is normal  No respiratory distress  Breath sounds: Normal breath sounds  No stridor  No wheezing, rhonchi or rales  Chest:      Chest wall: No tenderness, crepitus or edema  Abdominal:      General: There is no distension  Palpations: Abdomen is soft  Tenderness: There is no abdominal tenderness  There is no guarding or rebound  Musculoskeletal:         General: Normal range of motion  Cervical back: Normal range of motion and neck supple  Comments: Trace ble edema  No appreciable calf asymmetry on inspection  No overlying skin changes or evidence of skin breakdown  No palpable cords  DP pulses 2+ bilaterally, capillary refill intact  The patient reports tingling sensation with palpation of the feet bilaterally  PF/DF intact  Skin:     General: Skin is warm and dry  Capillary Refill: Capillary refill takes less than 2 seconds  Neurological:      General: No focal deficit present  Mental Status: She is alert  Motor: Motor function is intact  Gait: Gait is intact     Psychiatric:         Mood and Affect: Mood normal          Vital Signs  ED Triage Vitals [08/03/21 1334]   Temperature Pulse Respirations Blood Pressure SpO2   98 °F (36 7 °C) 100 20 154/72 95 %      Temp Source Heart Rate Source Patient Position - Orthostatic VS BP Location FiO2 (%)   Oral Monitor Sitting Left arm --      Pain Score       Worst Possible Pain           Vitals:    08/03/21 1334 08/03/21 1830 08/03/21 1900   BP: 154/72 139/77 141/83   Pulse: 100 82 89   Patient Position - Orthostatic VS: Sitting Lying Lying         Visual Acuity      ED Medications  Medications   potassium chloride (K-DUR,KLOR-CON) CR tablet 40 mEq (40 mEq Oral Given 8/3/21 0612)   iohexol (OMNIPAQUE) 350 MG/ML injection (SINGLE-DOSE) 100 mL (100 mL Intravenous Given 8/3/21 1752)       Diagnostic Studies  Results Reviewed     Procedure Component Value Units Date/Time    Hepatic function panel [[id number]]  (Abnormal) Collected: 08/03/21 1647    Lab Status: Final result Specimen: Blood from Arm, Left Updated: 08/03/21 1720     Total Bilirubin 0 38 mg/dL      Bilirubin, Direct 0 09 mg/dL      Alkaline Phosphatase 84 U/L      AST 23 U/L      ALT 28 U/L      Total Protein 7 0 g/dL      Albumin 3 0 g/dL     NT-BNP PRO [[id number]]  (Normal) Collected: 08/03/21 1647    Lab Status: Final result Specimen: Blood from Arm, Left Updated: 08/03/21 1720     NT-proBNP 39 pg/mL     Quantitative hCG [[id number]]  (Normal) Collected: 08/03/21 1647    Lab Status: Final result Specimen: Blood from Arm, Left Updated: 08/03/21 1720     HCG, Quant <2 mIU/mL     Narrative:       Expected Ranges:     Approximate               Approximate HCG  Gestation age          Concentration ( mIU/mL)  _____________          ______________________   Herlinda Ajit                      HCG values  0 2-1                       5-50  1-2                           2-3                         100-5000  3-4                         500-04320  4-5                         1000-54476  5-6                         26500-001999  6-8                         [id number]  8-12                        [id number]      Protime-INR [[id number]]  (Normal) Collected: 08/03/21 1647    Lab Status: Final result Specimen: Blood from Arm, Left Updated: 08/03/21 1718     Protime 13 5 seconds      INR 1 01    APTT [[id number]]  (Abnormal) Collected: 08/03/21 1647    Lab Status: Final result Specimen: Blood from Arm, Left Updated: 08/03/21 1718     PTT 22 seconds     D-Dimer [[id number]]  (Abnormal) Collected: 08/03/21 1647    Lab Status: Final result Specimen: Blood from Arm, Left Updated: 08/03/21 1718     D-Dimer, Quant 0 71 ug/ml FEU     Basic metabolic panel [[id number]] (Abnormal) Collected: 08/03/21 1647    Lab Status: Final result Specimen: Blood from Arm, Left Updated: 08/03/21 1713     Sodium 138 mmol/L      Potassium 3 1 mmol/L      Chloride 106 mmol/L      CO2 24 mmol/L      ANION GAP 8 mmol/L      BUN 9 mg/dL      Creatinine 0 51 mg/dL      Glucose 76 mg/dL      Calcium 7 5 mg/dL      eGFR 134 ml/min/1 73sq m     Narrative:      [place] guidelines for Chronic Kidney Disease (CKD):     Stage 1 with normal or high GFR (GFR > 90 mL/min/1 73 square meters)    Stage 2 Mild CKD (GFR = 60-89 mL/min/1 73 square meters)    Stage 3A Moderate CKD (GFR = 45-59 mL/min/1 73 square meters)    Stage 3B Moderate CKD (GFR = 30-44 mL/min/1 73 square meters)    Stage 4 Severe CKD (GFR = 15-29 mL/min/1 73 square meters)    Stage 5 End Stage CKD (GFR <15 mL/min/1 73 square meters)  Note: GFR calculation is accurate only with a steady state creatinine    Troponin I [[id number]]  (Normal) Collected: 08/03/21 1647    Lab Status: Final result Specimen: Blood from Arm, Left Updated: 08/03/21 1711     Troponin I <0 02 ng/mL     CBC and differential [[id number]]  (Abnormal) Collected: 08/03/21 1647    Lab Status: Final result Specimen: Blood from Arm, Left Updated: 08/03/21 1703     WBC 13 85 Thousand/uL      RBC 4 92 Million/uL      Hemoglobin 12 8 g/dL      Hematocrit 40 2 %      MCV 82 fL      MCH 26 0 pg      MCHC 31 8 g/dL      RDW 14 6 %      MPV 9 7 fL      Platelets 084 Thousands/uL      nRBC 0 /100 WBCs      Neutrophils Relative 75 %      Immat GRANS % 1 %      Lymphocytes Relative 19 %      Monocytes Relative 4 %      Eosinophils Relative 1 %      Basophils Relative 0 %      Neutrophils Absolute 10 48 Thousands/µL      Immature Grans Absolute 0 08 Thousand/uL      Lymphocytes Absolute 2 58 Thousands/µL      Monocytes Absolute 0 50 Thousand/µL      Eosinophils Absolute 0 17 Thousand/µL      Basophils Absolute 0 04 Thousands/µL                  CTA ED chest PE Study   Final Result by Rehana Hughes MD (08/03 1845)      No evidence of pulmonary embolus  Workstation performed: [id number]         VAS lower limb venous duplex study, unilateral/limited    (Results Pending)   XR chest 2 views    (Results Pending)              Procedures  ECG 12 Lead Documentation Only    Date/Time: 8/3/2021 5:29 PM  Performed by: Mich Hoffmann PA-C  Authorized by: Mich Hoffmann PA-C     Indications / Diagnosis:  Shortness of breath  Patient location:  ED  Previous ECG:     Previous ECG:  Unavailable  Interpretation:     Interpretation: abnormal    Rate:     ECG rate:  84    ECG rate assessment: normal    Rhythm:     Rhythm: sinus rhythm    Ectopy:     Ectopy: none    QRS:     QRS axis:  Normal    QRS intervals:  Normal  Conduction:     Conduction: normal    ST segments:     ST segments:  Normal  T waves:     T waves: non-specific               ED Course  ED Course as of Aug 03 1922   Tue Aug 03, 2021   1830 Duplex negative for acute DVT per ultrasound technician                                SBIRT 22yo+      Most Recent Value   SBIRT (23 yo +)   In order to provide better care to our patients, we are screening all of our patients for alcohol and drug use  Would it be okay to ask you these screening questions?   Unable to answer at this time Filed at: 08/03/2021 1617          Wells' Criteria for PE      Most Recent Value   Wells' Criteria for PE   Clinical signs and symptoms of DVT  0 Filed at: 08/03/2021 1741   PE is primary diagnosis or equally likely  3 Filed at: 08/03/2021 1741   HR >100  0 Filed at: 08/03/2021 1741   Immobilization at least 3 days or Surgery in the previous 4 weeks  0 Filed at: 08/03/2021 1741   Previous, objectively diagnosed PE or DVT  1 5 Filed at: 08/03/2021 1741   Hemoptysis  0 Filed at: 08/03/2021 1741   Malignancy with treatment within 6 months or palliative  0 Filed at: 08/03/2021 1741   Wells' Criteria Total  4 5 Filed at: 08/03/2021 8016 MDM  Number of Diagnoses or Management Options  Chest pain, unspecified: new and requires workup  Hypokalemia  Syncope: new and requires workup  Diagnosis management comments: This is a 64-year-old female with numerous comorbid conditions presenting to the emergency department for evaluation with chief complaint of right calf pain x1 week  In further discussion with the patient, the patient admits that she has been experiencing bilateral lower extremity pain though her right leg had appeared red yesterday which was concerning for her  She additionally reports experiencing shortness of breath over the past week  The patient has prior history of DVT in the left lower extremity which had been managed with Lovenox injections however the patient states that she has not been taking Lovenox since 2017  Reports two syncopal episodes at work this week with no prior history of syncope  She presently endorses complaint of chest pain without palpitations  Differential diagnosis includes but not limited to:  Labs to evaluate for ACS/UA, syncope, likely vasovagal, orthostatic hypotension, anemia, electrolyte derangement, dehydration, maria luisa, arf, screening D-dimer for further evaluation of shortness of breath, BNP to evaluate for evidence of congestive heart failure; neuropathy; duplex to evaluate for evidence of acute DVT    Initial ED plan:  ECG, chest x-ray, cardiac labs to include D-dimer, duplex, anticipate admission    Final ED Assessment:  Vital signs stable on hospital arrival, examination as above  Oxygen saturations 95% on room air with no appreciable respiratory distress on assessment  All labs and imaging independently reviewed with imaging interpreted by the radiologist  ECG/troponin without ischemic changes  Labs notable for hypokalemia of 3 1 for which oral supplementation was given  Duplex negative for acute deep venous thrombosis   D dimer mildly elevated, PE study reports no evidence of pulmonary embolism  Patient continued to experience chest pain during assessments, though noting this had improved throughout ed course  With complaint of active chest pain in setting of recent syncopal episodes, hospital admission recommended for further cardiac work up with the patient was understanding and agreeable with  Case discussed with Dr Bud Osler, patient accepted to the medicine service  The patient had remained hemodynamically stable and well-appearing during ED course  She is stable for admission  Bridging orders placed  Amount and/or Complexity of Data Reviewed  Clinical lab tests: ordered and reviewed  Tests in the radiology section of CPT®: ordered and reviewed  Review and summarize past medical records: yes  Independent visualization of images, tracings, or specimens: yes    Risk of Complications, Morbidity, and/or Mortality  Presenting problems: moderate  Diagnostic procedures: moderate  Management options: moderate    Patient Progress  Patient progress: stable      Disposition  Final diagnoses:   Chest pain, unspecified   Syncope   Hypokalemia     Time reflects when diagnosis was documented in both MDM as applicable and the Disposition within this note     Time User Action Codes Description Comment    8/3/2021  7:20 PM Belen Ba Add [R07 9] Chest pain, unspecified     8/3/2021  7:22 PM Belen Ba Add [R55] Syncope     8/3/2021  7:22 PM Belen Ba Add [E87 6] Hypokalemia       ED Disposition     ED Disposition Condition Date/Time Comment    Admit Stable Tue Aug 3, 2021  7:19 PM Case was discussed with Rita and the patient's admission status was agreed to be Admission Status: observation status to the service of Dr Tanika Borjas   Follow-up Information    None         Patient's Medications   Discharge Prescriptions    No medications on file     No discharge procedures on file      PDMP Review     None          ED Provider  Electronically Signed by           Josephine Angel KOKI  08/10/21 5382

## 2021-08-03 NOTE — H&P
Ugo 1995, 23 y o  female MRN: [id number]  Unit/Bed#: ED 18 Encounter: [id number]  Primary Care Provider: Lizzeth Bell MD   Date and time admitted to hospital: 8/3/2021  3:48 PM    * Syncope  Assessment & Plan   Patient has morbid obesity, hypertension, severe anxiety    She also reports that she has been working 16 hour shifts this week she was recently started on 400 mg of Seroquel in July secondary to some issues with insomnia and has only been able to sleep 4-5 hours at night which could be contributing to some of her syncopal episodes   Patient does not report syncopal episodes after standing she reports that after she has been standing for several hours she starts to feel weak fatigued nauseated and shaky does report poor nutrition   Patient also has very severe anxiety   Initial troponin negative, continue to trend   EKG   Orthostatic vitals   Echocardiogram  · Telemetry    Other chest pain  Assessment & Plan  Reports significant history anxiety which she is seeing Outpatient Psychiatry for however she feels like she is being poorly managed emotional support provided  Initial troponin is negative continue to trend  EKG unremarkable  CTA of chest negative for PE  History significant for morbid obesity and hypertension  Echocardiogram  Hold off on Cardiology consult pending workup    Morbid obesity with BMI of 50 0-59 9, adult ([facility] )  Assessment & Plan  Counseled on lifestyle modifications    Leg swelling  Assessment & Plan  History of bilateral lower extremity edema  Bilateral duplex study negative for DVT  Has a history of DVTs  Not maintained on anticoagulation at this time    Bipolar affective disorder, current episode mixed ([facility] )  Assessment & Plan  Continue home medications     VTE Prophylaxis: Enoxaparin (Lovenox)  / sequential compression device   Code Status: full code  POLST: POLST form is not discussed and not completed at this time   Discussion with family: not available    Anticipated Length of Stay:  Patient will be admitted on an Observation basis with an anticipated length of stay of  < 2 midnights  Justification for Hospital Stay: evaluation of syncope    Total Time for Visit, including Counseling / Coordination of Care: 70 minutes  Greater than 50% of this total time spent on direct patient counseling and coordination of care  Chief Complaint:       Chief Complaint   Patient presents with    Leg Swelling     pt reports R leg swerlling, redness, pain, hx of clots, x 1 week      History of Present Illness:    Cristopher White is a 23 y o  female who presents with leg swelling and syncope  The patient reports that she has been experiencing these symptoms over the past week  She states that yesterday evening her right lower extremity had appeared red though this has since resolved  The patient reports prior history of DVT in the left lower extremity, stating that she was previously on Lovenox  She states that she has not taken this medication since 2017  The patient denies any known injuries  She denies recent surgical procedures, prolonged immobilization, recent travel, estrogen use  Reports occasional tobacco use "when stressed " The patient states that she feels short of breath x1 week  She presently denies chest pain or palpitations  She states that she has had 2 syncopal episodes over the past couple of days while at work, stating that her heart was racing at the time  She denies nausea or episodes of vomiting, abdominal pain, back pain  She denies recent medication changes  Per review of EMR, the patient had called PCP on 6/30/21 for evaluation of foot pain and swelling to be seen in follow up; however, the patient discharged from [facility] office for no-show appointments  She states that she now has a new primary care provider  Review of Systems:    Review of Systems   Constitutional: Positive for activity change  HENT: Negative  Eyes: Negative  Respiratory: Positive for chest tightness and shortness of breath  Cardiovascular: Positive for chest pain and leg swelling  Gastrointestinal: Positive for nausea  Endocrine: Negative  Musculoskeletal: Negative  Neurological: Negative  Psychiatric/Behavioral: The patient is nervous/anxious  All other systems reviewed and are negative  Past Medical and Surgical History:     Past Medical History:   Diagnosis Date    Anxiety     takes Zoloft; stopped during pregnancy     Bipolar affective disorder ([facility] ) 2017    Cardiac asystole (HCC)     Chronic hypertension with superimposed preeclampsia 2017    Depression     DVT (deep venous thrombosis) ([facility] )     LLE-post cholecystectomy    Intrauterine growth restriction affecting care of mother, antepartum, third trimester, fetus 1     wi9th prior preg    Migraine     Multiple gestation     Right hemiparesis ([facility] )     stroke in utero    Seizures [facility] )     in 2016    Severe preeclampsia, third trimester     Spontaneous      seven times    Varicella     in childhood        Past Surgical History:   Procedure Laterality Date     SECTION      x3    CHOLECYSTECTOMY      [facility]  DEL[facility] N/[facility] 2017    2015 daughter 32 weeks, 2016 son, 2017 twin son and daughter, 31 weeks    [facility]  DELIVERY ONLY N/A 2018    Procedure:  SECTION () REPEAT;  Surgeon: Maribel Cross MD;  Location: [facility] ;  Service: Obstetrics    [facility] Azul Blakes TUBE W/ Bilateral 2018    Procedure: LIGATION/COAGULATION TUBAL;  Surgeon: Maribel Cross MD;  Location: [facility] ;  Service: Obstetrics       Meds/Allergies:    Prior to Admission medications    Medication Sig Start Date End Date Taking?  Authorizing Provider   B Complex-C-Folic Acid (Cici-Ryan Rx) 1 MG TABS TAKE 1 TABLET BY MOUTH DAILY WITH BREAKFAST 3/31/21   DOV [place] EPINEPHrine (EpiPen 2-[place]) 0 3 mg/0 3 mL SOAJ Inject 0 3 mL (0 3 mg total) into a muscle once for 1 dose 8/31/20 8/31/20  DOV Escobar   ferrous sulfate 324 (65 Fe) mg TAKE 1 TABLET (324 MG TOTAL) BY MOUTH DAILY BEFORE BREAKFAST 3/31/21   DOV Brush   lisinopril (ZESTRIL) 5 mg tablet TAKE 1 TABLET (5 MG TOTAL) BY MOUTH DAILY 6/30/21   DOV Brush   loxapine Marilyne Points) 25 mg capsule Take 1 capsule by mouth daily 5/10/21   Historical Provider, MD   loxapine (LOXITANE) 50 MG capsule Take 1 capsule by mouth daily at bedtime 5/10/21   Historical Provider, MD   methocarbamol (ROBAXIN) 500 mg tablet Take 1 tablet (500 mg total) by mouth 2 (two) times a day as needed for muscle spasms 6/2/20   DOV Brush   QUEtiapine (SEROquel) 400 MG tablet Take 1 tablet by mouth daily at bedtime 5/7/21   Historical Provider, MD   sertraline (ZOLOFT) 50 mg tablet TAKE 1 TABLET (50 MG TOTAL) BY MOUTH DAILY 5/11/21   DOV Brush   triamcinolone (KENALOG) 0 1 % cream Apply topically 2 (two) times a day 5/12/21   DOV Brush     I have reviewed home medications using allscripts  Allergies:    Allergies   Allergen Reactions    Bee Venom Anaphylaxis    Other Anaphylaxis and Swelling     Honey     Penicillins Anaphylaxis and Swelling    Flu Virus Vaccine Rash       Social History:     Marital Status: /Civil Union   Occupation: N/A  Patient Pre-hospital Living Situation: private residence  Patient Pre-hospital Level of Mobility: independent  Patient Pre-hospital Diet Restrictions: none  Substance Use History:   Social History     Substance and Sexual Activity   Alcohol Use No     Social History     Tobacco Use   Smoking Status Former Smoker   Smokeless Tobacco Never Used     Social History     Substance and Sexual Activity   Drug Use No       Family History:    Family History   Problem Relation Age of Onset    Pulmonary embolism Mother     Deep vein thrombosis Mother     Depression Mother     Hypertension Mother     Thyroid disease Mother     Mental illness Mother         bipolar    Diabetes Maternal Grandmother     Deep vein thrombosis Father     Pulmonary embolism Father     Depression Sister     Heart disease Sister     Mental illness Sister         bipolar schizo       Physical Exam:     Vitals:   Blood Pressure: 153/75 (08/03/21 1942)  Pulse: 84 (08/03/21 1942)  Temperature: 98 °F (36 7 °C) (08/03/21 1334)  Temp Source: Oral (08/03/21 1334)  Respirations: 18 (08/03/21 1942)  Height: 5' 1" (154 9 cm) (08/03/21 1334)  Weight - Scale: (!) 138 kg (305 lb) (08/03/21 1334)  SpO2: 100 % (08/03/21 1942)    Physical Exam  Vitals and nursing note reviewed  HENT:      Head: Normocephalic  Cardiovascular:      Rate and Rhythm: Normal rate  Pulses: Normal pulses  Pulmonary:      Effort: Pulmonary effort is normal    Abdominal:      Palpations: Abdomen is soft  Musculoskeletal:         General: Normal range of motion  Cervical back: Normal range of motion  Skin:     General: Skin is warm and dry  Neurological:      General: No focal deficit present  Mental Status: She is alert  Mental status is at baseline  Psychiatric:         Mood and Affect: Mood normal          Thought Content: Thought content normal          Judgment: Judgment normal        Additional Data:     Lab Results: I have personally reviewed pertinent reports        Results from last 7 days   Lab Units 08/03/21  1647   WBC Thousand/uL 13 85*   HEMOGLOBIN g/dL 12 8   HEMATOCRIT % 40 2   PLATELETS Thousands/uL 375   NEUTROS PCT % 75   LYMPHS PCT % 19   MONOS PCT % 4   EOS PCT % 1     Results from last 7 days   Lab Units 08/03/21  1647   SODIUM mmol/L 138   POTASSIUM mmol/L 3 1*   CHLORIDE mmol/L 106   CO2 mmol/L 24   BUN mg/dL 9   CREATININE mg/dL 0 51*   ANION GAP mmol/L 8   CALCIUM mg/dL 7 5*   ALBUMIN g/dL 3 0*   TOTAL BILIRUBIN mg/dL 0 38   ALK PHOS U/L 84   ALT U/L 28   AST U/L 23   GLUCOSE RANDOM mg/dL 76     Results from last 7 days   Lab Units 08/03/21  1647   INR  1 01                   Imaging: I have personally reviewed pertinent reports  CTA ED chest PE Study   Final Result by Al Handley MD (08/03 1845)      No evidence of pulmonary embolus  Workstation performed: [id number]         VAS lower limb venous duplex study, unilateral/limited    (Results Pending)   XR chest 2 views    (Results Pending)       EKG, Pathology, and Other Studies Reviewed on Admission:   · EKG: na    Allscripts / Epic Records Reviewed: Yes     ** Please Note: This note has been constructed using a voice recognition system   **

## 2021-08-04 ENCOUNTER — TELEPHONE (OUTPATIENT)
Dept: FAMILY MEDICINE CLINIC | Facility: CLINIC | Age: 26
End: 2021-08-04

## 2021-08-04 ENCOUNTER — APPOINTMENT (OUTPATIENT)
Dept: NON INVASIVE DIAGNOSTICS | Facility: HOSPITAL | Age: 26
End: 2021-08-04
Payer: COMMERCIAL

## 2021-08-04 VITALS
WEIGHT: 293 LBS | HEART RATE: 86 BPM | BODY MASS INDEX: 55.32 KG/M2 | OXYGEN SATURATION: 96 % | SYSTOLIC BLOOD PRESSURE: 139 MMHG | HEIGHT: 61 IN | RESPIRATION RATE: 20 BRPM | DIASTOLIC BLOOD PRESSURE: 96 MMHG | TEMPERATURE: 98 F

## 2021-08-04 PROBLEM — E87.6 HYPOKALEMIA: Status: ACTIVE | Noted: 2021-08-04

## 2021-08-04 PROBLEM — R07.89 OTHER CHEST PAIN: Status: RESOLVED | Noted: 2021-08-03 | Resolved: 2021-08-04

## 2021-08-04 LAB
ANION GAP SERPL CALCULATED.3IONS-SCNC: 8 MMOL/L (ref 4–13)
BUN SERPL-MCNC: 6 MG/DL (ref 5–25)
CALCIUM SERPL-MCNC: 8.6 MG/DL (ref 8.3–10.1)
CHLORIDE SERPL-SCNC: 105 MMOL/L (ref 100–108)
CO2 SERPL-SCNC: 24 MMOL/L (ref 21–32)
CREAT SERPL-MCNC: 0.6 MG/DL (ref 0.6–1.3)
ERYTHROCYTE [DISTWIDTH] IN BLOOD BY AUTOMATED COUNT: 14.7 % (ref 11.6–15.1)
GFR SERPL CREATININE-BSD FRML MDRD: 127 ML/MIN/1.73SQ M
GLUCOSE P FAST SERPL-MCNC: 125 MG/DL (ref 65–99)
GLUCOSE SERPL-MCNC: 125 MG/DL (ref 65–140)
HCT VFR BLD AUTO: 37.8 % (ref 34.8–46.1)
HGB BLD-MCNC: 11.9 G/DL (ref 11.5–15.4)
MCH RBC QN AUTO: 26 PG (ref 26.8–34.3)
MCHC RBC AUTO-ENTMCNC: 31.5 G/DL (ref 31.4–37.4)
MCV RBC AUTO: 83 FL (ref 82–98)
PLATELET # BLD AUTO: 361 THOUSANDS/UL (ref 149–390)
PMV BLD AUTO: 9 FL (ref 8.9–12.7)
POTASSIUM SERPL-SCNC: 3.4 MMOL/L (ref 3.5–5.3)
RBC # BLD AUTO: 4.58 MILLION/UL (ref 3.81–5.12)
SODIUM SERPL-SCNC: 137 MMOL/L (ref 136–145)
TROPONIN I SERPL-MCNC: <0.02 NG/ML
WBC # BLD AUTO: 8.85 THOUSAND/UL (ref 4.31–10.16)

## 2021-08-04 PROCEDURE — 84484 ASSAY OF TROPONIN QUANT: CPT | Performed by: NURSE PRACTITIONER

## 2021-08-04 PROCEDURE — 85027 COMPLETE CBC AUTOMATED: CPT | Performed by: PHYSICIAN ASSISTANT

## 2021-08-04 PROCEDURE — 99217 PR OBSERVATION CARE DISCHARGE MANAGEMENT: CPT | Performed by: PHYSICIAN ASSISTANT

## 2021-08-04 PROCEDURE — 80048 BASIC METABOLIC PNL TOTAL CA: CPT | Performed by: PHYSICIAN ASSISTANT

## 2021-08-04 PROCEDURE — 93306 TTE W/DOPPLER COMPLETE: CPT | Performed by: INTERNAL MEDICINE

## 2021-08-04 PROCEDURE — 93306 TTE W/DOPPLER COMPLETE: CPT

## 2021-08-04 PROCEDURE — 36415 COLL VENOUS BLD VENIPUNCTURE: CPT | Performed by: NURSE PRACTITIONER

## 2021-08-04 RX ORDER — ONDANSETRON 2 MG/ML
4 INJECTION INTRAMUSCULAR; INTRAVENOUS EVERY 6 HOURS PRN
Status: DISCONTINUED | OUTPATIENT
Start: 2021-08-04 | End: 2021-08-04 | Stop reason: HOSPADM

## 2021-08-04 RX ORDER — FERROUS SULFATE 325(65) MG
325 TABLET ORAL
Status: DISCONTINUED | OUTPATIENT
Start: 2021-08-04 | End: 2021-08-04 | Stop reason: HOSPADM

## 2021-08-04 RX ORDER — QUETIAPINE FUMARATE 300 MG/1
300 TABLET, FILM COATED ORAL
Start: 2021-08-04 | End: 2021-08-11

## 2021-08-04 RX ORDER — MAGNESIUM HYDROXIDE/ALUMINUM HYDROXICE/SIMETHICONE 120; 1200; 1200 MG/30ML; MG/30ML; MG/30ML
30 SUSPENSION ORAL EVERY 6 HOURS PRN
Status: DISCONTINUED | OUTPATIENT
Start: 2021-08-04 | End: 2021-08-04 | Stop reason: HOSPADM

## 2021-08-04 RX ORDER — QUETIAPINE FUMARATE 300 MG/1
600 TABLET, FILM COATED ORAL
COMMUNITY
Start: 2021-06-21 | End: 2021-08-04

## 2021-08-04 RX ORDER — LOXAPINE SUCCINATE 5 MG/1
50 TABLET ORAL
Status: DISCONTINUED | OUTPATIENT
Start: 2021-08-04 | End: 2021-08-04 | Stop reason: HOSPADM

## 2021-08-04 RX ORDER — SODIUM CHLORIDE 9 MG/ML
100 INJECTION, SOLUTION INTRAVENOUS CONTINUOUS
Status: DISCONTINUED | OUTPATIENT
Start: 2021-08-04 | End: 2021-08-04 | Stop reason: HOSPADM

## 2021-08-04 RX ORDER — LISINOPRIL 5 MG/1
5 TABLET ORAL DAILY
Status: DISCONTINUED | OUTPATIENT
Start: 2021-08-04 | End: 2021-08-04 | Stop reason: HOSPADM

## 2021-08-04 RX ORDER — ACETAMINOPHEN 325 MG/1
650 TABLET ORAL EVERY 6 HOURS PRN
Status: DISCONTINUED | OUTPATIENT
Start: 2021-08-04 | End: 2021-08-04 | Stop reason: HOSPADM

## 2021-08-04 RX ORDER — DOCUSATE SODIUM 100 MG/1
100 CAPSULE, LIQUID FILLED ORAL 2 TIMES DAILY
Status: DISCONTINUED | OUTPATIENT
Start: 2021-08-04 | End: 2021-08-04 | Stop reason: HOSPADM

## 2021-08-04 RX ORDER — POTASSIUM CHLORIDE 20 MEQ/1
20 TABLET, EXTENDED RELEASE ORAL ONCE
Status: COMPLETED | OUTPATIENT
Start: 2021-08-04 | End: 2021-08-04

## 2021-08-04 RX ORDER — DIPHENHYDRAMINE HCL 25 MG
25 TABLET ORAL EVERY 6 HOURS PRN
Status: DISCONTINUED | OUTPATIENT
Start: 2021-08-04 | End: 2021-08-04 | Stop reason: HOSPADM

## 2021-08-04 RX ADMIN — DOCUSATE SODIUM 100 MG: 100 CAPSULE, LIQUID FILLED ORAL at 10:13

## 2021-08-04 RX ADMIN — SERTRALINE HYDROCHLORIDE 50 MG: 50 TABLET ORAL at 10:13

## 2021-08-04 RX ADMIN — LOXAPINE 50 MG: 5 CAPSULE ORAL at 03:33

## 2021-08-04 RX ADMIN — QUETIAPINE FUMARATE 300 MG: 200 TABLET ORAL at 03:32

## 2021-08-04 RX ADMIN — FERROUS SULFATE TAB 325 MG (65 MG ELEMENTAL FE) 325 MG: 325 (65 FE) TAB at 06:36

## 2021-08-04 RX ADMIN — SODIUM CHLORIDE 100 ML/HR: 0.9 INJECTION, SOLUTION INTRAVENOUS at 02:08

## 2021-08-04 RX ADMIN — POTASSIUM CHLORIDE 20 MEQ: 1500 TABLET, EXTENDED RELEASE ORAL at 12:06

## 2021-08-04 RX ADMIN — LISINOPRIL 5 MG: 5 TABLET ORAL at 10:13

## 2021-08-04 NOTE — ASSESSMENT & PLAN NOTE
 Pt reported syncopal episode at work    Denies any post syncopal symptoms    Reports she has been working 16 hour shifts, dealing with insomnia, lack of po intake which likely contributed with standing for long periods of time   Likely vasovagal syncopal episode   Orthostats negative    Troponin negative x 3   ECHO with EF 60% and no RWMA, no significant valvular abnormalities   Pt denies any chest pain currently    D-dimer mildly elevated on admission    CTA negative for acute PE, venous duplex negative for DVT bilaterally    Recommend follow up with PCP as an outpatient, encourage good hydration and improvement in sleep cycle

## 2021-08-04 NOTE — UTILIZATION REVIEW
Initial Clinical Review    Admission: Date/Time/Statement:   Admission Orders (From admission, onward)     Ordered        08/03/21 1920  Place in Observation  Once                   Orders Placed This Encounter   Procedures    Place in Observation     Standing Status:   Standing     Number of Occurrences:   1     Order Specific Question:   Level of Care     Answer:   Med Surg [16]     ED Arrival Information     Expected Arrival Acuity    - 8/3/2021 13:29 Urgent         Means of arrival Escorted by Service Admission type    Walk-In Friend Hospitalist Urgent         Arrival complaint    Leg Swelling; Foot Swelling        Chief Complaint   Patient presents with    Leg Swelling     pt reports R leg swerlling, redness, pain, hx of clots, x 1 week      Initial Presentation:   Ms Marcos Worthington is a 24 yo female who presents to the ED from home with c/o leg swelling, RLE redness, SOB and syncopal episodes at work x 2 with heart racing x 1 week  PMH:  LLE DVT in 2017, now off Lovenox, Bipolar disorder, mixed episode obesity  No known causes of these events  On exam she had SOB and was nervous and anxious  In the ED troponin was negative and CT PE study negative for PE BLE venous duplex negative for DVT  Syncope - clarification that she gets weak, fatigued, nauseated and shakey after standing for several hours  Recently increased Seroquel dosing d/t insomnia  She is admitted to OBSERVATION status with syncope - Tele, echo, trend troponins, orthostatics  Leg swelling - monitor  Date: 8/4    Day 2:   Orthostatics are negative       ED Triage Vitals [08/03/21 1334]   Temperature Pulse Respirations Blood Pressure SpO2   98 °F (36 7 °C) 100 20 154/72 95 %      Temp Source Heart Rate Source Patient Position - Orthostatic VS BP Location FiO2 (%)   Oral Monitor Sitting Left arm --      Pain Score       Worst Possible Pain          Wt Readings from Last 1 Encounters:   08/03/21 (!) 138 kg (305 lb)     Additional Vital Signs: 08/04/21 1013  --  --  --  139/96  --  --  --  --   08/04/21 0500  --  86  20  107/53  76  96 %  None (Room air)  --   08/04/21 0300  --  70  15  106/58  74  97 %  None (Room air)  --   08/03/21 1942  --  84  18  153/75  104  100 %  None (Room air)  Lying - Orthostatic VS   08/03/21 1937  --  94  18  163/91  120  98 %  --  Sitting - Orthostatic VS   08/03/21 1935  --  110Abnormal   18  153/91  115  100 %  None (Room air)  Standing - Orthostatic VS   08/03/21 1900  --  89  16  141/83  104  100 %  None (Room air)  Lying   08/03/21 1830  --  82  15  139/77  102  99 %  None (Room air)  Lying     Pertinent Labs/Diagnostic Test Results:     8/3 CXR, CTA ED chest PE study, BLE venous duplex  - no acute injury, disease       8/3 ECG - NSR with non-specific T waves    8/3 Echo - pending       Results from last 7 days   Lab Units 08/03/21  1647   WBC Thousand/uL 13 85*   HEMOGLOBIN g/dL 12 8   HEMATOCRIT % 40 2   PLATELETS Thousands/uL 375   NEUTROS ABS Thousands/µL 10 48*         Results from last 7 days   Lab Units 08/03/21  1647   SODIUM mmol/L 138   POTASSIUM mmol/L 3 1*   CHLORIDE mmol/L 106   CO2 mmol/L 24   ANION GAP mmol/L 8   BUN mg/dL 9   CREATININE mg/dL 0 51*   EGFR ml/min/1 73sq m 134   CALCIUM mg/dL 7 5*     Results from last 7 days   Lab Units 08/03/21  1647   AST U/L 23   ALT U/L 28   ALK PHOS U/L 84   TOTAL PROTEIN g/dL 7 0   ALBUMIN g/dL 3 0*   TOTAL BILIRUBIN mg/dL 0 38   BILIRUBIN DIRECT mg/dL 0 09         Results from last 7 days   Lab Units 08/03/21  1647   GLUCOSE RANDOM mg/dL 76     Results from last 7 days   Lab Units 08/04/21  0636 08/04/21  0332 08/03/21  1647   TROPONIN I ng/mL <0 02 <0 02 <0 02     Results from last 7 days   Lab Units 08/03/21  1647   D-DIMER QUANTITATIVE ug/ml FEU 0 71*     Results from last 7 days   Lab Units 08/03/21  1647   PROTIME seconds 13 5   INR  1 01   PTT seconds 22*     Results from last 7 days   Lab Units 08/03/21  1647   NT-PRO BNP pg/mL 39     ED Treatment: Medication Administration from 2021 1329 to 2021 1013    Date/Time Order Dose Route Action   2021 1753 potassium chloride (K-DUR,KLOR-CON) CR tablet 40 mEq 40 mEq Oral Given   2021 175 iohexol (OMNIPAQUE) 350 MG/ML injection (SINGLE-DOSE) 100 mL 100 mL Intravenous Given   2021 0636 ferrous sulfate tablet 325 mg 325 mg Oral Given   2021 0332 QUEtiapine (SEROquel) tablet 300 mg 300 mg Oral Given   2021 0333 loxapine (LOXITANE) capsule 50 mg 50 mg Oral Given   2021 0208 sodium chloride 0 9 % infusion 100 mL/hr Intravenous New Bag        Past Medical History:   Diagnosis Date    Anxiety     takes Zoloft; stopped during pregnancy     Bipolar affective disorder ([facility] ) 2017    Cardiac asystole (HCC)     Chronic hypertension with superimposed preeclampsia 2017    Depression     DVT (deep venous thrombosis) ([facility] )     LLE-post cholecystectomy    Intrauterine growth restriction affecting care of mother, antepartum, third trimester, fetus 1     wi9th prior preg    Migraine     Multiple gestation     Right hemiparesis ([facility] )     stroke in utero    Seizures ([facility] )     in 2016    Severe preeclampsia, third trimester     Spontaneous      seven times    Varicella     in childhood      Present on Admission:   Syncope   Other chest pain   Leg swelling   Bipolar affective disorder, current episode mixed ([facility] )      Admitting Diagnosis: Leg swelling [M79 89]     Age/Sex: 23 y o  female     Admission Orders:    Scheduled Medications:  docusate sodium, 100 mg, Oral, BID  ferrous sulfate, 325 mg, Oral, Daily With Breakfast  lisinopril, 5 mg, Oral, Daily  loxapine, 50 mg, Oral, HS  QUEtiapine, 300 mg, Oral, HS  sertraline, 50 mg, Oral, Daily      Continuous IV Infusions:  sodium chloride, 100 mL/hr, Intravenous, Continuous      PRN Meds:  acetaminophen, 650 mg, Oral, Q6H PRN  aluminum-magnesium hydroxide-simethicone, 30 mL, Oral, Q6H PRN  diphenhydrAMINE, 25 mg, Oral, Q6H PRN  ondansetron, 4 mg, Intravenous, Q6H PRN    SCDs  Echo   Tele   OOB as ravinder and ambulate 4 x daily     Network Utilization Review Department  ATTENTION: Please call with any questions or concerns to [phone] and carefully listen to the prompts so that you are directed to the right person  All voicemails are confidential   Abhinav Clevelande all requests for admission clinical reviews, approved or denied determinations and any other requests to dedicated fax number below belonging to the campus where the patient is receiving treatment   List of dedicated fax numbers for the Facilities:  [address] DENIALS (Administrative/Medical Necessity) [phone]   [address] (Maternity/NICU/Pediatrics) [phone]   [address] [place] [address] [id number] [place] [address] [address] [place] [address] [place] [address] [phone]

## 2021-08-04 NOTE — TELEPHONE ENCOUNTER
Received inbox message from Dr Maurisio Begum --- he is listed as her pcp, but pt has only been seen by [place] at [phone]

## 2021-08-04 NOTE — ASSESSMENT & PLAN NOTE
· BP appears stable on review   · Orthostatic blood pressures negative   · Continue lisinopril 5 mg daily   · Follow up with PCP outpatient for continued management

## 2021-08-04 NOTE — PLAN OF CARE
Problem: Potential for Falls  Goal: Patient will remain free of falls  Description: INTERVENTIONS:  - Educate patient/family on patient safety including physical limitations  - Instruct patient to call for assistance with activity   - Consult OT/PT to assist with strengthening/mobility   - Keep Call bell within reach  - Keep bed low and locked with side rails adjusted as appropriate  - Keep care items and personal belongings within reach  - Initiate and maintain comfort rounds  - Make Fall Risk Sign visible to staff  - Offer Toileting every 2 Hours, in advance of need  - Initiate/Maintain bed alarm  - Obtain necessary fall risk management equipment: none  - Apply yellow socks and bracelet for high fall risk patients  - Consider moving patient to room near nurses station  Outcome: Progressing

## 2021-08-04 NOTE — DISCHARGE SUMMARY
[address] [facility]  Discharge- Tayler Douglas 1995, 23 y o  female MRN: [id number]  Unit/Bed#: ED 18 Encounter: [id number]  Primary Care Provider: Edwar Wang MD   Date and time admitted to hospital: 8/3/2021  3:48 PM     DOS: 8/4/2021  * Syncope  Assessment & Plan   Pt reported syncopal episode at work    Denies any post syncopal symptoms    Reports she has been working 16 hour shifts, dealing with insomnia, lack of po intake which likely contributed with standing for long periods of time  Likely vasovagal syncopal episode   Orthostats negative    Troponin negative x 3   ECHO with EF 60% and no RWMA, no significant valvular abnormalities   Pt denies any chest pain currently    D-dimer mildly elevated on admission    CTA negative for acute PE, venous duplex negative for DVT bilaterally    Recommend follow up with PCP as an outpatient, encourage good hydration and improvement in sleep cycle    Bipolar affective disorder, current episode mixed ([address] )  Assessment & Plan  · Mood currently appears stable   · When asked to see psychiatric provider here, pt declined  · Denies any SI/HI   · Continue Zoloft and Seroquel at decreased dose of 300 mg daily   · Pt reports having side effects to the Seroquel which she reports her psychiatrist is aware of  Pt and friend at bedside report she has a phone appointment with her outpatient psychiatrist at 1600 today, encouraged to keep this appointment and discuss any additional medication changes in regarding to her seroquel and other psychiatric mediations       Hypokalemia  Assessment & Plan  · K+ 3 4 today   · Replete with 20 mEq KDUR   · Follow up with PCP outpatient     Morbid obesity with BMI of 50 0-59 9, adult (HCC)  Assessment & Plan  · As evidenced by BMI of 57 63  · Counseled on lifestyle modifications and weight loss    Leg swelling  Assessment & Plan  · History of bilateral lower extremity edema  · Bilateral duplex study negative for DVT  · Has a history of DVTs  · Not maintained on anticoagulation at this time    Essential hypertension  Assessment & Plan  · BP appears stable on review   · Orthostatic blood pressures negative   · Continue lisinopril 5 mg daily   · Follow up with PCP outpatient for continued management    Discharging Physician / Practitioner: Kanu Lugo PA-C  PCP: Gonzales Fleming MD  Admission Date:   Admission Orders (From admission, onward)     Ordered        08/03/21 1920  Place in Observation  Once                   Discharge Date: 08/04/21    Medical Problems     Resolved Problems  Date Reviewed: 8/4/2021        Resolved    Other chest pain 8/4/2021     Resolved by  Kanu Lugo PA-C                Consultations During Hospital Stay:  · None    Procedures Performed:   · CXR 8/3 - No acute consolidation or congestion  Mild scoliosis with convexity to the right side  · CTA chest PE study 8/3 - No evidence of pulmonary embolus   · Venous duplex 8/3 - no evidence of acute or chronic DVT bilaterally   · ECHO 8/4 - EF 60%, no RWMA, trace mitral regurg, mild to moderate tricuspid regurg    Significant Findings / Test Results:   · K+ 3 4, repleted prior to discharge   · Troponin negative x 4  · Leukocytosis resolved spontaneously   · D-dimer 0 71     Incidental Findings:   · None     Test Results Pending at Discharge (will require follow up): · None     Outpatient Tests Requested:  · Per PCP    Complications:  None    Reason for Admission: Syncope    Hospital Course:     Martin Lewis is a 23 y o  female patient with significant past medical history of bipolar affective disorder, obesity who originally presented to the hospital on 8/3/2021 due to syncopal episode  Patient reports that she was at work and started feeling lightheaded when she passed out  Reports that she believes that she was out for a few minutes and denies any symptoms after she recovered    She had also been complaining of some chest pain symptoms and D-dimer was obtain  This was elevated and therefore patient underwent CTA and venous duplex that were negative for PE/DVT  She had troponins that were trended that were negative  She also underwent echocardiogram that was mainly unremarkable  Patient's symptoms resolved throughout hospitalization  It appears patient's syncopal episode was likely vasovagal in setting of long hours at work, poor p o  Intake and constant standing  Orthostatic vitals were negative while inpatient as well  Patient was discharged in stable condition and advised to follow-up closely with her PCP in the next few days  For additional information please refer to medical records  Medication changes include:  Decrease in Seroquel to 300 mg daily, advised to follow-up with her psychiatrist on day of discharge for which she has an appointment scheduled to discuss additional changes in her psych meds      Please see above list of diagnoses and related plan for additional information  Condition at Discharge: stable     Discharge Day Visit / Exam:     Subjective:  Patient currently denies any lightheadedness, dizziness, chest pain, shortness of breath, abdominal pain, nausea or vomiting  No additional syncopal episodes while inpatient  Patient has been walking to and from the bathroom without difficulties per nursing staff  Vitals: Blood Pressure: 139/96 (08/04/21 1013)  Pulse: 86 (08/04/21 0500)  Temperature: 98 °F (36 7 °C) (08/03/21 1334)  Temp Source: Oral (08/03/21 1334)  Respirations: 20 (08/04/21 0500)  Height: 5' 1" (154 9 cm) (08/03/21 1334)  Weight - Scale: (!) 138 kg (305 lb) (08/03/21 1334)  SpO2: 96 % (08/04/21 0500)  Exam:   Physical Exam  Vitals reviewed  Constitutional:       General: She is not in acute distress  Appearance: She is not toxic-appearing  Comments: Patient is in no acute distress lying in her hospital bed resting comfortably   HENT:      Head: Normocephalic and atraumatic     Eyes: Extraocular Movements: Extraocular movements intact  Conjunctiva/sclera: Conjunctivae normal    Cardiovascular:      Rate and Rhythm: Normal rate and regular rhythm  Pulses: Normal pulses  Pulmonary:      Effort: Pulmonary effort is normal  No respiratory distress  Breath sounds: No wheezing  Abdominal:      General: Bowel sounds are normal  There is no distension  Palpations: Abdomen is soft  Tenderness: There is no abdominal tenderness  Musculoskeletal:      Right lower leg: No edema  Left lower leg: No edema  Skin:     General: Skin is warm and dry  Findings: No erythema  Neurological:      General: No focal deficit present  Mental Status: She is alert  Comments: Moves all extremities appropriately and equally  Speech is fluent, face is symmetrical   Psychiatric:         Mood and Affect: Mood normal        Discussion with Family:  Discussed with patient at bedside regarding plan of care  When asked update friends or family members patient politely declined  Advised close follow-up with PCP in the next few days and to discuss with her psychiatrist regarding management of her Seroquel as she reports side effects to this medication  Discharge instructions/Information to patient and family:   See after visit summary for information provided to patient and family  Provisions for Follow-Up Care:  See after visit summary for information related to follow-up care and any pertinent home health orders  Disposition:     Home    For Discharges to [facility]:   · Not Applicable to this Patient - Not Applicable to this Patient    Planned Readmission:  None     Discharge Statement:  I spent 35 minutes discharging the patient  This time was spent on the day of discharge  I had direct contact with the patient on the day of discharge   Greater than 50% of the total time was spent examining patient, answering all patient questions, arranging and discussing plan of care with patient as well as directly providing post-discharge instructions  Additional time then spent on discharge activities  Discharge Medications:  See after visit summary for reconciled discharge medications provided to patient and family        ** Please Note: This note has been constructed using a voice recognition system **

## 2021-08-04 NOTE — DISCHARGE INSTRUCTIONS
Please follow up with your primary care provider within one week   If you begin to have any lightheadedness, dizziness, chest pain or shortness of breath please come back to the hospital for further evaluation   Syncope   AMBULATORY CARE:   Syncope  is also called fainting or passing out  Syncope is a sudden, temporary loss of consciousness, followed by a fall from a standing or sitting position  Syncope ranges from not serious to a sign of a more serious condition that needs to be treated  You can control some health conditions that cause syncope  Your healthcare providers can help you create a plan to manage syncope and prevent episodes  Signs and symptoms that may occur before syncope include the following:   · Cold, clammy, and sweaty skin     · Fast breathing and a racing, pounding heartbeat     · Feeling more tired than usual     · Nausea, a warm feeling, and sweating    · A headache, or feeling lightheaded or dizzy    · Tingling sensation or numbness     · Spots in front of your eyes, blurred vision, or double vision    Seek care immediately if:   · You are bleeding because you hit your head when you fainted  · You suddenly have double vision, difficulty speaking, numbness, and cannot move your arms or legs  · You have chest pain and trouble breathing  · You vomit blood or material that looks like coffee grounds  · You see blood in your bowel movement  Contact your healthcare provider if:   · You have new or worsening symptoms  · You have another syncope episode  · You have a headache, fast heartbeat, or feel too dizzy to stand up  · You have questions or concerns about your condition or care  Treatment  depends on the cause of your syncope  To prevent syncope from happening again, you may  need any of the following:  · Medicines  may be needed to treat any medical conditions that are causing your syncope  These may include medicines to help your heart pump strongly and regularly  Your healthcare provider may also make changes to any medicines that are causing syncope  · Tilt training  involves training yourself to stand for 10 to 30 minutes each day against a wall  This helps your body decrease the effects of posture changes and reduces the number of fainting spells  Manage syncope:   · Keep a record of your syncope episodes  Include your symptoms and your activity before and after the episode  The record can help your healthcare provider find the cause of your syncope and help you manage episodes  · Sit or lie down when needed  This includes when you feel dizzy, your throat is getting tight, and your vision changes  Raise your legs above the level of your heart  · Take slow, deep breaths if you start to breathe faster with anxiety or fear  This can help decrease dizziness and the feeling that you might faint  · Check your blood pressure often  This is important if you take medicine to lower your blood pressure  Check your blood pressure when you are lying down and when you are standing  Ask how often to check during the day  Keep a record of your blood pressure numbers  Your healthcare provider may use the record to help plan your treatment  Prevent a syncope episode:   · Move slowly and let yourself get used to one position before you move to another position  This is very important when you change from a lying or sitting position to a standing position  Take some deep breaths before you stand up from a lying position  Stand up slowly  Sudden movements may cause a fainting spell  Sit on the side of the bed or couch for a few minutes before you stand up  If you are on bedrest, try to be upright for about 2 hours each day, or as directed  Do not lock your legs if you are standing for a long period of time  Move your legs and bend your knees to keep blood flowing  · Follow your healthcare provider's recommendations    Your provider may  recommend that you drink more liquids to prevent dehydration  You may also need to have more salt to keep your blood pressure from dropping too low and causing syncope  Your provider will tell you how much liquid and sodium to have each day  He or she will also tell you how much physical activity is safe for you  This will depend on what is causing your syncope  · Watch for signs of low blood sugar  These include hunger, nervousness, sweating, and fast or fluttery heartbeats  Talk with your healthcare provider about ways to keep your blood sugar level steady  · Do not strain if you are constipated  You may faint if you strain to have a bowel movement  Walking is the best way to get your bowels moving  Eat foods high in fiber to make it easier to have a bowel movement  Good examples are high-fiber cereals, beans, vegetables, and whole-grain breads  Prune juice may help make bowel movements softer  · Be careful in hot weather  Heat can cause a syncope episode  Limit activity done outside on hot days  Physical activity in hot weather can lead to dehydration  This can cause an episode  Follow up with your healthcare provider as directed:  Write down your questions so you remember to ask them during your visits  © Copyright [employer] 2021 Information is for End User's use only and may not be sold, redistributed or otherwise used for commercial purposes  All illustrations and images included in CareNotes® are the copyrighted property of [employer]  or Shai Lockhart  The above information is an  only  It is not intended as medical advice for individual conditions or treatments  Talk to your doctor, nurse or pharmacist before following any medical regimen to see if it is safe and effective for you

## 2021-08-04 NOTE — ASSESSMENT & PLAN NOTE
· History of bilateral lower extremity edema  · Bilateral duplex study negative for DVT  · Has a history of DVTs  · Not maintained on anticoagulation at this time

## 2021-08-04 NOTE — ASSESSMENT & PLAN NOTE
· Mood currently appears stable   · When asked to see psychiatric provider here, pt declined  · Denies any SI/HI   · Continue Zoloft and Seroquel at decreased dose of 300 mg daily   · Pt reports having side effects to the Seroquel which she reports her psychiatrist is aware of  Pt and friend at bedside report she has a phone appointment with her outpatient psychiatrist at 1600 today, encouraged to keep this appointment and discuss any additional medication changes in regarding to her seroquel and other psychiatric mediations

## 2021-08-05 ENCOUNTER — TRANSITIONAL CARE MANAGEMENT (OUTPATIENT)
Dept: FAMILY MEDICINE CLINIC | Facility: CLINIC | Age: 26
End: 2021-08-05

## 2021-08-05 NOTE — TELEPHONE ENCOUNTER
Pt discharged from hospital 8/4/21, will call to discuss what office patient to be scheduled for TCM

## 2021-08-11 ENCOUNTER — OFFICE VISIT (OUTPATIENT)
Dept: FAMILY MEDICINE CLINIC | Facility: CLINIC | Age: 26
End: 2021-08-11
Payer: COMMERCIAL

## 2021-08-11 VITALS
DIASTOLIC BLOOD PRESSURE: 84 MMHG | BODY MASS INDEX: 53.81 KG/M2 | WEIGHT: 285 LBS | OXYGEN SATURATION: 98 % | HEIGHT: 61 IN | HEART RATE: 84 BPM | SYSTOLIC BLOOD PRESSURE: 128 MMHG | TEMPERATURE: 98.8 F

## 2021-08-11 DIAGNOSIS — Z87.59 HISTORY OF DEEP VEIN THROMBOSIS (DVT) DURING PREGNANCY: ICD-10-CM

## 2021-08-11 DIAGNOSIS — Z86.718 HISTORY OF DEEP VEIN THROMBOSIS (DVT) DURING PREGNANCY: ICD-10-CM

## 2021-08-11 DIAGNOSIS — F41.9 ANXIETY: ICD-10-CM

## 2021-08-11 DIAGNOSIS — E61.1 IRON DEFICIENCY: ICD-10-CM

## 2021-08-11 DIAGNOSIS — Z09 HOSPITAL DISCHARGE FOLLOW-UP: Primary | ICD-10-CM

## 2021-08-11 DIAGNOSIS — I10 ESSENTIAL HYPERTENSION: ICD-10-CM

## 2021-08-11 DIAGNOSIS — Z65.9 POOR SOCIAL SITUATION: ICD-10-CM

## 2021-08-11 DIAGNOSIS — G81.91 RIGHT HEMIPARESIS (HCC): ICD-10-CM

## 2021-08-11 DIAGNOSIS — E66.01 MORBID OBESITY WITH BMI OF 50.0-59.9, ADULT (HCC): ICD-10-CM

## 2021-08-11 DIAGNOSIS — M79.606 PAIN OF LOWER EXTREMITY, UNSPECIFIED LATERALITY: ICD-10-CM

## 2021-08-11 DIAGNOSIS — R56.9 SEIZURE (HCC): ICD-10-CM

## 2021-08-11 DIAGNOSIS — Z91.030 BEE STING ALLERGY: ICD-10-CM

## 2021-08-11 DIAGNOSIS — F31.60 BIPOLAR AFFECTIVE DISORDER, CURRENT EPISODE MIXED, CURRENT EPISODE SEVERITY UNSPECIFIED (HCC): ICD-10-CM

## 2021-08-11 PROBLEM — Z60.9 POOR SOCIAL SITUATION: Status: ACTIVE | Noted: 2021-08-11

## 2021-08-11 PROCEDURE — 99214 OFFICE O/P EST MOD 30 MIN: CPT | Performed by: STUDENT IN AN ORGANIZED HEALTH CARE EDUCATION/TRAINING PROGRAM

## 2021-08-11 RX ORDER — EPINEPHRINE 0.3 MG/.3ML
0.3 INJECTION SUBCUTANEOUS ONCE
Qty: 0.6 ML | Refills: 0 | Status: SHIPPED | OUTPATIENT
Start: 2021-08-11 | End: 2021-08-11

## 2021-08-11 RX ORDER — ZIPRASIDONE HYDROCHLORIDE 80 MG/1
80 CAPSULE ORAL
COMMUNITY
Start: 2021-08-05

## 2021-08-11 RX ORDER — PERPHENAZINE 8 MG
8 TABLET ORAL
COMMUNITY
Start: 2021-08-05

## 2021-08-11 RX ORDER — CYCLOBENZAPRINE HCL 5 MG
5 TABLET ORAL 2 TIMES DAILY PRN
Qty: 30 TABLET | Refills: 0 | Status: SHIPPED | OUTPATIENT
Start: 2021-08-11 | End: 2022-02-28

## 2021-08-11 RX ORDER — FERROUS SULFATE TAB EC 324 MG (65 MG FE EQUIVALENT) 324 (65 FE) MG
324 TABLET DELAYED RESPONSE ORAL
Qty: 90 TABLET | Refills: 0 | Status: SHIPPED | OUTPATIENT
Start: 2021-08-11 | End: 2021-09-25

## 2021-08-11 NOTE — ASSESSMENT & PLAN NOTE
Patient currently living with her spouse and a hotel  She gets very limited income due to disability  Does have right-sided hemiparesis which would make any physical labor very difficult to complete  Discussed that I would be happy to contact  to reach out and see what other options are available at this time    However patient stated she would like to hold on that at the current time

## 2021-08-11 NOTE — ASSESSMENT & PLAN NOTE
Likely a large contributor towards the chest pains as they occur when she is getting anxious   Discussed coping options and follows with Psych (Dr Owen Nieto)

## 2021-08-11 NOTE — PROGRESS NOTES
Assessment/Plan:         Problem List Items Addressed This Visit        Cardiovascular and Mediastinum    Essential hypertension     Well controlled         Relevant Medications    EPINEPHrine (EpiPen 2-Trenton) 0 3 mg/0 3 mL SOAJ       Nervous and Auditory    Right hemiparesis (HCC)     Stable            Other    Anxiety     Likely a large contributor towards the chest pains as they occur when she is getting anxious  Discussed coping options and follows with Psych (Dr Luli Merida)         Bipolar affective disorder ([place] [address] )     Stable on current medications          Relevant Medications    ziprasidone (GEODON) 80 mg capsule    perphenazine 8 mg tablet    ferrous sulfate 324 (65 Fe) mg    History of deep vein thrombosis (DVT) during pregnancy    Relevant Medications    ferrous sulfate 324 (65 Fe) mg    Seizure (HCC)    Relevant Medications    ferrous sulfate 324 (65 Fe) mg    Morbid obesity with BMI of 50 0-59 9, adult ([facility])    Pain of lower extremity     Worse with the R leg, likely 2/2 contractures given the hemiparesis  Will try flexeril          Relevant Medications    cyclobenzaprine (FLEXERIL) 5 mg tablet    Poor social situation       Patient currently living with her spouse and a hotel  She gets very limited income due to disability  Does have right-sided hemiparesis which would make any physical labor very difficult to complete  Discussed that I would be happy to contact  to reach out and see what other options are available at this time    However patient stated she would like to hold on that at the current time           Other Visit Diagnoses     Hospital discharge follow-up    -  Primary    BMI 50 0-59 9, adult ([place] [address] )        Relevant Orders    HEMOGLOBIN A1C W/ EAG ESTIMATION    TSH, 3rd generation with Free T4 reflex    Bee sting allergy        Relevant Medications    EPINEPHrine (EpiPen 2-Trenton) 0 3 mg/0 3 mL SOAJ    Iron deficiency        Relevant Orders    Iron Panel (Includes Ferritin, Iron Sat%, Iron, and TIBC)            Subjective:      Patient ID: [place] Goltz is a 23 y o  female  Patient coming for hospital discharge follow-up  Was hospitalized for chest pain  Had normal EKG, enzymes, and echo  She reports the chest pain occurs when she is getting stressed mentally  This occurs when she is at work due to financial constraints as well  Denies any shortness of breath  Would also like to discuss pain in her legs  It is mostly in the right side in the calf and hamstrings and occurs in the morning mostly  Depression  Associated symptoms include myalgias, numbness (chronic R sided) and weakness (chronic R sided)  Pertinent negatives include no abdominal pain, arthralgias, chest pain, chills, coughing, fatigue, fever, headaches, nausea, rash, sore throat or vomiting  The following portions of the patient's history were reviewed and updated as appropriate:   Past Medical History:  She has a past medical history of Anxiety, Bipolar affective disorder ([facility] ) (2017), [facility] Chronic hypertension with superimposed preeclampsia (2017), Depression, DVT (deep venous thrombosis) ([facility] ) (), Intrauterine growth restriction affecting care of mother, antepartum, third trimester, fetus 1, Migraine, Multiple gestation, Right hemiparesis ([facility] ), Seizures ([facility] [address] ), Severe preeclampsia, third trimester, Spontaneous , and Varicella  ,  _______________________________________________________________________  Medical Problems:  does not have any pertinent problems on file ,  _______________________________________________________________________  Past Surgical History:   has a past surgical history that includes Cholecystectomy; pr  delivery only (N/A, 2017);   section; pr  delivery only (N/A, 2018); and pr ligation,fallopian tube w/ (Bilateral, 7/20/2018)  ,  _______________________________________________________________________  Family History:  family history includes Deep vein thrombosis in her father and mother; Depression in her mother and sister; Diabetes in her maternal grandmother; Heart disease in her sister; Hypertension in her mother; Mental illness in her mother and sister; Pulmonary embolism in her father and mother; Thyroid disease in her mother ,  _______________________________________________________________________  Social History:   reports that she has quit smoking  She has never used smokeless tobacco  She reports that she does not drink alcohol and does not use drugs  ,  _______________________________________________________________________  Allergies:  is allergic to bee venom, other, penicillins, seroquel [quetiapine], and flu virus vaccine     _______________________________________________________________________  Current Outpatient Medications   Medication Sig Dispense Refill    B Complex-C-Folic Acid ([facility]) 1 MG TABS TAKE 1 TABLET BY MOUTH DAILY WITH BREAKFAST 90 tablet 0    EPINEPHrine (EpiPen 2-Trenton) 0 3 mg/0 3 mL SOAJ Inject 0 3 mL (0 3 mg total) into a muscle once for 1 dose 0 6 mL 0    ferrous sulfate 324 (65 Fe) mg Take 1 tablet (324 mg total) by mouth daily before breakfast 90 tablet 0    lisinopril (ZESTRIL) 5 mg tablet TAKE 1 TABLET (5 MG TOTAL) BY MOUTH DAILY 90 tablet 1    perphenazine 8 mg tablet Take 8 mg by mouth daily at bedtime      sertraline (ZOLOFT) 50 mg tablet TAKE 1 TABLET (50 MG TOTAL) BY MOUTH DAILY 30 tablet 3    ziprasidone (GEODON) 80 mg capsule Take 80 mg by mouth daily at bedtime      cyclobenzaprine (FLEXERIL) 5 mg tablet Take 1 tablet (5 mg total) by mouth 2 (two) times a day as needed for muscle spasms 30 tablet 0     No current facility-administered medications for this visit      _______________________________________________________________________  Review of Systems Constitutional: Negative for chills, fatigue and fever  HENT: Negative for rhinorrhea and sore throat  Eyes: Negative for visual disturbance  Respiratory: Negative for cough and shortness of breath  Cardiovascular: Negative for chest pain and palpitations  Gastrointestinal: Negative for abdominal pain, constipation, diarrhea, nausea and vomiting  Genitourinary: Negative for difficulty urinating, dysuria and frequency  Musculoskeletal: Positive for gait problem and myalgias  Negative for arthralgias  Skin: Negative for color change and rash  Neurological: Positive for weakness (chronic R sided) and numbness (chronic R sided)  Negative for headaches  Psychiatric/Behavioral: Positive for depression  Objective:  Vitals:    08/11/21 0950   BP: 128/84   BP Location: Left arm   Patient Position: Sitting   Cuff Size: Large   Pulse: 84   Temp: 98 8 °F (37 1 °C)   SpO2: 98%   Weight: 129 kg (285 lb)   Height: 5' 1" (1 549 m)     Body mass index is 53 85 kg/m²  Physical Exam  Constitutional:       General: She is not in acute distress  Appearance: Normal appearance  She is obese  She is not ill-appearing  HENT:      Head: Normocephalic and atraumatic  Right Ear: External ear normal       Left Ear: External ear normal       Nose: Nose normal  No congestion or rhinorrhea  Mouth/Throat:      Mouth: Mucous membranes are moist       Pharynx: Oropharynx is clear  No oropharyngeal exudate or posterior oropharyngeal erythema  Eyes:      Extraocular Movements: Extraocular movements intact  Conjunctiva/sclera: Conjunctivae normal       Pupils: Pupils are equal, round, and reactive to light  Cardiovascular:      Rate and Rhythm: Normal rate and regular rhythm  Pulses: Normal pulses  Heart sounds: No murmur heard  Pulmonary:      Effort: Pulmonary effort is normal  No respiratory distress  Breath sounds: Normal breath sounds  No wheezing     Chest:      Chest wall: No tenderness  Abdominal:      General: Bowel sounds are normal       Palpations: Abdomen is soft  Tenderness: There is no abdominal tenderness  Musculoskeletal:         General: Normal range of motion  Cervical back: Normal range of motion  Skin:     General: Skin is warm and dry  Capillary Refill: Capillary refill takes less than 2 seconds  Findings: No rash  Neurological:      General: No focal deficit present  Mental Status: She is alert and oriented to person, place, and time  Mental status is at baseline  GCS: GCS eye subscore is 4  GCS verbal subscore is 5  GCS motor subscore is 6  Cranial Nerves: Cranial nerves are intact  Motor: Weakness (R sided (baseline)) present  Gait: Gait abnormal and tandem walk abnormal (2/2 hemiparesis of R leg)  Deep Tendon Reflexes:      Reflex Scores:       Tricep reflexes are 3+ on the right side and 2+ on the left side  Patellar reflexes are 3+ on the right side and 2+ on the left side  Psychiatric:         Attention and Perception: Attention normal          Mood and Affect: Mood is depressed  Affect is flat  Speech: Speech normal          Behavior: Behavior normal            TCM Call (since 7/11/2021)     Date and time call was made  8/5/2021  2:12 PM    Hospital care reviewed  Records reviewed    Patient was hospitialized at  [facility]        Date of Admission  08/03/21    Date of discharge  08/04/21    Diagnosis  Chest Pain     Disposition  Home    Were the patients medications reviewed and updated  Yes    Current Symptoms  Chest pain    Chest pain severity  Mild    Chest pain onset  Sudden      TCM Call (since 7/11/2021)     Post hospital issues  None    Should patient be enrolled in anticoag monitoring? No    Scheduled for follow up? Yes    Did you obtain your prescribed medications  No    Why were you unable to obtain your medications  None prescribed       Do you need help managing your prescriptions or medications  No    Is transportation to your appointment needed  No    I have advised the patient to call PCP with any new or worsening symptoms  Leeann Villegas MA     Living Arrangements  Spouse or Significiant other    Support System  Family    The type of support provided  Emotional; Physical    Do you have social support  Yes, as much as I need    Are you recieving any outpatient services  No    Are you recieving home care services  No    Are you using any community resources  No    Current waiver services  No    Have you fallen in the last 12 months  Yes    How many times  Once    Interperter language line needed  No    Counseling  Patient    Counseling topics  Activities of daily living; instructions for management; patient and family education

## 2021-09-09 PROCEDURE — 93971 EXTREMITY STUDY: CPT | Performed by: SURGERY

## 2021-09-24 DIAGNOSIS — Z86.718 HISTORY OF DEEP VEIN THROMBOSIS (DVT) DURING PREGNANCY: ICD-10-CM

## 2021-09-24 DIAGNOSIS — R56.9 SEIZURE (HCC): ICD-10-CM

## 2021-09-24 DIAGNOSIS — Z87.59 HISTORY OF DEEP VEIN THROMBOSIS (DVT) DURING PREGNANCY: ICD-10-CM

## 2021-09-24 DIAGNOSIS — F31.60 BIPOLAR AFFECTIVE DISORDER, CURRENT EPISODE MIXED, CURRENT EPISODE SEVERITY UNSPECIFIED (HCC): ICD-10-CM

## 2021-09-25 RX ORDER — FERROUS SULFATE TAB EC 324 MG (65 MG FE EQUIVALENT) 324 (65 FE) MG
324 TABLET DELAYED RESPONSE ORAL
Qty: 90 TABLET | Refills: 0 | Status: SHIPPED | OUTPATIENT
Start: 2021-09-25 | End: 2022-02-09

## 2021-10-07 DIAGNOSIS — F31.60 BIPOLAR AFFECTIVE DISORDER, CURRENT EPISODE MIXED, CURRENT EPISODE SEVERITY UNSPECIFIED (HCC): ICD-10-CM

## 2021-11-17 DIAGNOSIS — F31.60 BIPOLAR AFFECTIVE DISORDER, CURRENT EPISODE MIXED, CURRENT EPISODE SEVERITY UNSPECIFIED (HCC): ICD-10-CM

## 2022-01-03 DIAGNOSIS — I10 ESSENTIAL HYPERTENSION: ICD-10-CM

## 2022-01-03 RX ORDER — LISINOPRIL 5 MG/1
5 TABLET ORAL DAILY
Qty: 90 TABLET | Refills: 1 | Status: SHIPPED | OUTPATIENT
Start: 2022-01-03 | End: 2022-07-07

## 2022-01-31 DIAGNOSIS — F31.60 BIPOLAR AFFECTIVE DISORDER, CURRENT EPISODE MIXED, CURRENT EPISODE SEVERITY UNSPECIFIED (HCC): ICD-10-CM

## 2022-02-28 ENCOUNTER — OFFICE VISIT (OUTPATIENT)
Dept: FAMILY MEDICINE CLINIC | Facility: CLINIC | Age: 27
End: 2022-02-28
Payer: COMMERCIAL

## 2022-02-28 VITALS
HEART RATE: 105 BPM | DIASTOLIC BLOOD PRESSURE: 88 MMHG | BODY MASS INDEX: 55.32 KG/M2 | OXYGEN SATURATION: 96 % | HEIGHT: 61 IN | WEIGHT: 293 LBS | SYSTOLIC BLOOD PRESSURE: 132 MMHG | TEMPERATURE: 97.9 F

## 2022-02-28 DIAGNOSIS — M79.89 LEG SWELLING: ICD-10-CM

## 2022-02-28 DIAGNOSIS — I73.9 CLAUDICATION OF LEFT LOWER EXTREMITY (HCC): ICD-10-CM

## 2022-02-28 DIAGNOSIS — R20.0 LOWER EXTREMITY NUMBNESS: Primary | ICD-10-CM

## 2022-02-28 DIAGNOSIS — M62.830 MUSCLE SPASM OF BACK: ICD-10-CM

## 2022-02-28 PROBLEM — O10.913 CHRONIC HYPERTENSION IN OBSTETRIC CONTEXT IN THIRD TRIMESTER: Status: RESOLVED | Noted: 2018-06-12 | Resolved: 2022-02-28

## 2022-02-28 PROCEDURE — 99214 OFFICE O/P EST MOD 30 MIN: CPT | Performed by: STUDENT IN AN ORGANIZED HEALTH CARE EDUCATION/TRAINING PROGRAM

## 2022-02-28 RX ORDER — BACLOFEN 10 MG/1
10 TABLET ORAL 3 TIMES DAILY
Qty: 45 TABLET | Refills: 0 | Status: SHIPPED | OUTPATIENT
Start: 2022-02-28

## 2022-03-24 ENCOUNTER — HOSPITAL ENCOUNTER (OUTPATIENT)
Dept: VASCULAR ULTRASOUND | Facility: HOSPITAL | Age: 27
Discharge: HOME | End: 2022-03-24
Payer: COMMERCIAL

## 2022-03-24 DIAGNOSIS — M79.89 LEG SWELLING: ICD-10-CM

## 2022-03-24 DIAGNOSIS — R20.0 LOWER EXTREMITY NUMBNESS: ICD-10-CM

## 2022-03-24 DIAGNOSIS — I73.9 CLAUDICATION OF LEFT LOWER EXTREMITY (HCC): ICD-10-CM

## 2022-03-24 PROCEDURE — 93971 EXTREMITY STUDY: CPT

## 2022-03-24 PROCEDURE — 93923 UPR/LXTR ART STDY 3+ LVLS: CPT | Performed by: SURGERY

## 2022-03-24 PROCEDURE — 93971 EXTREMITY STUDY: CPT | Performed by: SURGERY

## 2022-03-24 PROCEDURE — 93923 UPR/LXTR ART STDY 3+ LVLS: CPT

## 2022-03-25 ENCOUNTER — TELEPHONE (OUTPATIENT)
Dept: FAMILY MEDICINE CLINIC | Facility: CLINIC | Age: 27
End: 2022-03-25

## 2022-03-25 NOTE — TELEPHONE ENCOUNTER
T/c from pt - RCB from [facility] call regarding pt's VAS lower limb venous duplex study, unilateral/limited - notified pt, understood

## 2022-05-11 DIAGNOSIS — F31.60 BIPOLAR AFFECTIVE DISORDER, CURRENT EPISODE MIXED, CURRENT EPISODE SEVERITY UNSPECIFIED (HCC): ICD-10-CM

## 2022-05-19 DIAGNOSIS — R56.9 SEIZURE (HCC): ICD-10-CM

## 2022-05-19 DIAGNOSIS — Z87.59 HISTORY OF DEEP VEIN THROMBOSIS (DVT) DURING PREGNANCY: ICD-10-CM

## 2022-05-19 DIAGNOSIS — Z86.718 HISTORY OF DEEP VEIN THROMBOSIS (DVT) DURING PREGNANCY: ICD-10-CM

## 2022-05-19 DIAGNOSIS — F31.60 BIPOLAR AFFECTIVE DISORDER, CURRENT EPISODE MIXED, CURRENT EPISODE SEVERITY UNSPECIFIED (HCC): ICD-10-CM

## 2022-05-19 RX ORDER — FERROUS SULFATE TAB EC 324 MG (65 MG FE EQUIVALENT) 324 (65 FE) MG
324 TABLET DELAYED RESPONSE ORAL
Qty: 90 TABLET | Refills: 0 | Status: SHIPPED | OUTPATIENT
Start: 2022-05-19 | End: 2022-06-15

## 2022-06-06 ENCOUNTER — TELEPHONE (OUTPATIENT)
Dept: FAMILY MEDICINE CLINIC | Facility: CLINIC | Age: 27
End: 2022-06-06

## 2022-06-06 NOTE — TELEPHONE ENCOUNTER
Pt called -     Pt is inquiring if prescription for blood testing diabetes machine was sent in for her  Melissa Barcenas I have attempted to contact this patient by telephone, but there is no answer repeatedly  I will continue to try later  On current meds list but see previous message  Pt called [phone]  Phone was cutting off, we have very bad connection -  pt was taking to someone else and I heard a dog barking the back  Pt hung up on me

## 2022-06-06 NOTE — TELEPHONE ENCOUNTER
Patient and Betty Roe called together upset because they received a script from the pharmacy from [facility] for One Touch supplies and monitor idiopathic thrombocytopenic purpura said it was [facility] who sent it in  Patient is no longer a patient of [facility]  I let her know that I did not see he ordered this in her chart  I called the pharmacy to verify the medication and who it was sent by  I spoke with Mariela Torres from [place] and he states when it comes to diabetic supplies, they just put the provider on the prescribing name who is predominantly in their system  They saw [facility] had sent in medications for her in the past so they just put his name on it  He could not tell me who actually sent it since they already plugged in Sidney Guerrier name  They are switching it to [facility] since he is the one she see's now and they also have medications sent in predominantly by him  Juanlogan Culver was made aware of this  I told her I could not tell her who exactly sent it in so she could try calling the pharmacy to see if there is any way she can find out, but based off of our system, it was not [facility] J[facility]

## 2022-06-10 DIAGNOSIS — F31.60 BIPOLAR AFFECTIVE DISORDER, CURRENT EPISODE MIXED, CURRENT EPISODE SEVERITY UNSPECIFIED (HCC): ICD-10-CM

## 2022-06-14 DIAGNOSIS — Z86.718 HISTORY OF DEEP VEIN THROMBOSIS (DVT) DURING PREGNANCY: ICD-10-CM

## 2022-06-14 DIAGNOSIS — R56.9 SEIZURE (HCC): ICD-10-CM

## 2022-06-14 DIAGNOSIS — F31.60 BIPOLAR AFFECTIVE DISORDER, CURRENT EPISODE MIXED, CURRENT EPISODE SEVERITY UNSPECIFIED (HCC): ICD-10-CM

## 2022-06-14 DIAGNOSIS — Z87.59 HISTORY OF DEEP VEIN THROMBOSIS (DVT) DURING PREGNANCY: ICD-10-CM

## 2022-06-15 RX ORDER — FERROUS SULFATE TAB EC 324 MG (65 MG FE EQUIVALENT) 324 (65 FE) MG
324 TABLET DELAYED RESPONSE ORAL
Qty: 90 TABLET | Refills: 0 | Status: SHIPPED | OUTPATIENT
Start: 2022-06-15

## 2022-07-07 DIAGNOSIS — I10 ESSENTIAL HYPERTENSION: ICD-10-CM

## 2022-07-07 RX ORDER — LISINOPRIL 5 MG/1
5 TABLET ORAL DAILY
Qty: 90 TABLET | Refills: 1 | Status: SHIPPED | OUTPATIENT
Start: 2022-07-07 | End: 2022-10-13

## 2022-08-18 ENCOUNTER — TELEPHONE (OUTPATIENT)
Dept: FAMILY MEDICINE CLINIC | Facility: CLINIC | Age: 27
End: 2022-08-18

## 2022-08-18 DIAGNOSIS — R56.9 SEIZURE (HCC): Primary | ICD-10-CM

## 2022-08-18 NOTE — TELEPHONE ENCOUNTER
Zan Agent from [facility] called, she needs a physician referral from Dr Aj Su for Neurology for seizures  She is scheduled for 8/22/2022  She thinks she is self referring herself so if Dr Aj Su does not want to put in the referral due to not seeing her for this matter, please call Zan Rosenbaum back at [phone] so she can cancel the appointment with the patient

## 2022-08-19 NOTE — TELEPHONE ENCOUNTER
Patient has not been discharged  Spouse was not either, he chose to leave on his own based on his and my conversations, noted in chart

## 2022-08-22 ENCOUNTER — TELEPHONE (OUTPATIENT)
Dept: NEUROLOGY | Facility: CLINIC | Age: 27
End: 2022-08-22

## 2022-09-09 ENCOUNTER — TELEPHONE (OUTPATIENT)
Dept: NEUROLOGY | Facility: CLINIC | Age: 27
End: 2022-09-09

## 2022-09-29 ENCOUNTER — APPOINTMENT (OUTPATIENT)
Dept: RADIOLOGY | Facility: HOSPITAL | Age: 27
End: 2022-09-29
Payer: COMMERCIAL

## 2022-09-29 ENCOUNTER — HOSPITAL ENCOUNTER (EMERGENCY)
Facility: HOSPITAL | Age: 27
Discharge: HOME | End: 2022-09-29
Attending: EMERGENCY MEDICINE
Payer: COMMERCIAL

## 2022-09-29 VITALS
DIASTOLIC BLOOD PRESSURE: 84 MMHG | HEART RATE: 81 BPM | TEMPERATURE: 97.6 F | RESPIRATION RATE: 18 BRPM | SYSTOLIC BLOOD PRESSURE: 124 MMHG | OXYGEN SATURATION: 98 %

## 2022-09-29 DIAGNOSIS — S93.401A RIGHT ANKLE SPRAIN: Primary | ICD-10-CM

## 2022-09-29 PROCEDURE — 96372 THER/PROPH/DIAG INJ SC/IM: CPT

## 2022-09-29 PROCEDURE — 99284 EMERGENCY DEPT VISIT MOD MDM: CPT

## 2022-09-29 PROCEDURE — 73630 X-RAY EXAM OF FOOT: CPT

## 2022-09-29 PROCEDURE — 73590 X-RAY EXAM OF LOWER LEG: CPT

## 2022-09-29 PROCEDURE — 99283 EMERGENCY DEPT VISIT LOW MDM: CPT

## 2022-09-29 PROCEDURE — 73610 X-RAY EXAM OF ANKLE: CPT

## 2022-09-29 RX ORDER — KETOROLAC TROMETHAMINE 30 MG/ML
15 INJECTION, SOLUTION INTRAMUSCULAR; INTRAVENOUS ONCE
Status: COMPLETED | OUTPATIENT
Start: 2022-09-29 | End: 2022-09-29

## 2022-09-29 RX ADMIN — KETOROLAC TROMETHAMINE 15 MG: 30 INJECTION, SOLUTION INTRAMUSCULAR at 18:18

## 2022-09-29 NOTE — ED PROVIDER NOTES
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  Reports she does walk on her Tippy toes and rolled her ankle she frequently  Reports she has not had weight on the leg since and has been trying 4  Reports she has been lying in bed all day with the foot tightly wrapped with an Ace wrap  Reports she has numbness and tingling of the toes  Denies head strike, any other injury from the fall  Denies fevers, chills, rash, headache, weakness, dizziness, visual changes, abdominal pain, nausea, vomiting, diarrhea, constipation, chest pain, shortness of breath or difficulty breathing  Does not offer any other concerns or complaints  History provided by:  Patient   used: No    Ankle Injury  Location:  Right lower leg, ankle, foot  Duration:  1 day  Timing:  Constant  Progression:  Unchanged  Context:  Mode her ankle while walking down a set of stairs  Relieved by:  None tried  Worsened by:  Weight-bearing, pressure  Associated symptoms: no abdominal pain, no chest pain, no congestion, no cough, no diarrhea, no ear pain, no fatigue, no fever, no headaches, no loss of consciousness, no myalgias, no nausea, no rash, no rhinorrhea, no shortness of breath, no sore throat, no vomiting and no wheezing        Prior to Admission Medications   Prescriptions Last Dose Informant Patient Reported? Taking?    B Complex-C-Folic Acid ([facility]ici[facility]) 1 MG TABS   No No   Sig: TAKE 1 TABLET BY MOUTH DAILY WITH BREAKFAST   EPINEPHrine (EpiPen 2-[place]) 0 3 mg/0 3 mL SOAJ   No No   Sig: Inject 0 3 mL (0 3 mg total) into a muscle once for 1 dose   Patient not taking: Reported on 2022    baclofen 10 mg tablet   No No   Sig: Take 1 tablet (10 mg total) by mouth 3 (three) times a day   ferrous sulfate 324 (65 Fe) mg   No No   Sig: TAKE 1 TABLET (324 MG TOTAL) BY MOUTH DAILY BEFORE BREAKFAST   lisinopril (ZESTRIL) 5 mg tablet   No No   Sig: TAKE 1 TABLET (5 MG TOTAL) BY MOUTH DAILY   perphenazine 8 mg tablet   Yes No   Sig: Take 8 mg by mouth daily at bedtime   sertraline (ZOLOFT) 50 mg tablet   No No   Sig: TAKE 1 TABLET (50 MG TOTAL) BY MOUTH DAILY   ziprasidone (GEODON) 80 mg capsule   Yes No   Sig: Take 80 mg by mouth daily at bedtime      Facility-Administered Medications: None       Past Medical History:   Diagnosis Date    Anxiety     takes Zoloft; stopped during pregnancy     Bipolar affective disorder ([facility] [address] ) 2017    Cardiac asystole (HCC)     Chronic hypertension in obstetric context in third trimester 2018    Chronic hypertension with superimposed preeclampsia 2017    Depression     DVT (deep venous thrombosis) ([facility] [address] ) 2016    LLE-post cholecystectomy    Intrauterine growth restriction affecting care of mother, antepartum, third trimester, fetus 1     wi9th prior preg    Migraine     Multiple gestation     Right hemiparesis (HCC)     stroke in utero    Seizures ([facility] [address] )     in 2016    Severe preeclampsia, third trimester     Spontaneous      seven times    Varicella     in childhood        Past Surgical History:   Procedure Laterality Date     SECTION      x3    CHOLECYSTECTOMY      ID  DELIVERY ONLY N/A 2017    2015 daughter 32 weeks, 2016 son, 2017 twin son and daughter, 31 weeks    ID  DELIVERY ONLY N/A 2018    Procedure:  SECTION () REPEAT;  Surgeon: Alex Patel MD;  Location: [facility] ;  Service: Obstetrics    ID Terie Indy TUBE W/ Bilateral 2018    Procedure: LIGATION/COAGULATION TUBAL;  Surgeon: Alex Patel MD;  Location: [facility] ; Service: Obstetrics       Family History   Problem Relation Age of Onset    Pulmonary embolism Mother     Deep vein thrombosis Mother     Depression Mother     Hypertension Mother     Thyroid disease Mother     Mental illness Mother         bipolar    Diabetes Maternal Grandmother     Deep vein thrombosis Father     Pulmonary embolism Father     Depression Sister     Heart disease Sister     Mental illness Sister         bipolar schizo     I have reviewed and agree with the history as documented  E-Cigarette/Vaping     E-Cigarette/Vaping Substances     Social History     Tobacco Use    Smoking status: Former Smoker    Smokeless tobacco: Never Used   Substance Use Topics    Alcohol use: Never    Drug use: No       Review of Systems   Constitutional: Negative for chills, fatigue and fever  HENT: Negative for congestion, ear pain, rhinorrhea and sore throat  Eyes: Negative for pain and visual disturbance  Respiratory: Negative for cough, shortness of breath and wheezing  Cardiovascular: Negative for chest pain and palpitations  Gastrointestinal: Negative for abdominal pain, diarrhea, nausea and vomiting  Genitourinary: Negative for dysuria and hematuria  Musculoskeletal: Negative for arthralgias, back pain and myalgias  Right lower leg, ankle, foot pain  Swelling of the right ankle   Skin: Negative for color change and rash  Neurological: Negative for seizures, loss of consciousness, syncope and headaches  All other systems reviewed and are negative  Physical Exam  Physical Exam  Vitals and nursing note reviewed  Constitutional:       Appearance: Normal appearance  HENT:      Head: Normocephalic and atraumatic  Right Ear: External ear normal       Left Ear: External ear normal       Nose: Nose normal       Mouth/Throat:      Mouth: Mucous membranes are moist    Eyes:      General: No scleral icterus  Right eye: No discharge           Left eye: No discharge  Conjunctiva/sclera: Conjunctivae normal    Cardiovascular:      Rate and Rhythm: Normal rate  Pulses:           Dorsalis pedis pulses are 2+ on the right side  Posterior tibial pulses are 2+ on the right side  Pulmonary:      Effort: Pulmonary effort is normal  No respiratory distress  Musculoskeletal:         General: No swelling, deformity or signs of injury  Cervical back: Normal range of motion and neck supple  No rigidity  Right ankle: Swelling present  No deformity or ecchymosis  Tenderness present  Decreased range of motion ( secondary to pain)  Normal pulse  Right Achilles Tendon: Normal  No tenderness or defects  Right foot: Decreased range of motion (Secondary to pain)  No swelling  Normal pulse  Comments: Slight swelling of the right ankle  Here patient is presentation, reports paresthesias of the toes  Patient refused to move her toes, foot, ankle on examination secondary to pain    Skin:     General: Skin is warm and dry  Capillary Refill: Capillary refill takes less than 2 seconds  Coloration: Skin is not jaundiced  Findings: No erythema or rash  Neurological:      General: No focal deficit present  Mental Status: She is alert and oriented to person, place, and time  Mental status is at baseline  Cranial Nerves: No cranial nerve deficit  Gait: Gait normal    Psychiatric:         Mood and Affect: Mood normal          Behavior: Behavior normal          Thought Content:  Thought content normal          Judgment: Judgment normal          Vital Signs  ED Triage Vitals   Temperature Pulse Respirations Blood Pressure SpO2   09/29/22 1552 09/29/22 1552 09/29/22 1552 09/29/22 1552 09/29/22 1552   97 6 °F (36 4 °C) 81 18 124/84 98 %      Temp Source Heart Rate Source Patient Position - Orthostatic VS BP Location FiO2 (%)   09/29/22 1552 09/29/22 1552 09/29/22 1552 09/29/22 1552 --   Temporal Monitor Sitting Left arm Pain Score       09/29/22 1818       10 - Worst Possible Pain           Vitals:    09/29/22 1552   BP: 124/84   Pulse: 81   Patient Position - Orthostatic VS: Sitting         Visual Acuity      ED Medications  Medications   ketorolac (TORADOL) injection 15 mg (15 mg Intramuscular Given 9/29/22 1818)       Diagnostic Studies  Results Reviewed     None                 XR ankle 3+ views RIGHT    (Results Pending)   XR foot 3+ views RIGHT    (Results Pending)   XR tibia fibula 2 views RIGHT    (Results Pending)              Procedures  Procedures         ED Course                   MDM  Number of Diagnoses or Management Options  Right ankle sprain: new and requires workup  Diagnosis management comments: This is a 52-year-old female presented to the emergency department for evaluation right foot pain  Reports she rolled her ankle while walking down a set of stairs on a bridge  Reports she has baseline hemiparesis of the right side due to a stroke in the womb  Reports the ankle is swollen  Reports the ankle was wrapped with an Ace wrap last night after the incident and has been wrapped since  Patient denies use of Tylenol, Motrin, ice, elevation  Reports she has been lying in bed all day and has not attempted to walk on the leg due to pain  Differential diagnosis to include but is not limited to:  Fracture, dislocation, strain, sprain    Initial ED Plan:  X-ray right- tibia/fibula, ankle, foot    ED results:  No acute osseous abnormality  -reports slight alleviation of her medication    Final ED assessment: Patient is stable and well appearing  Discussed radiologic studies  Discussed follow-up with PCP and Orthopedics  Discussed use of crutches and Aircast as needed for pain  Discussed rest, ice, elevation, and compression  Strict return precautions were discussed including but not limited to worsening pain, decreased range of motion, decreased sensation, increased swelling   Patient verbalized understanding and is agreeable with the plan for discharge  Amount and/or Complexity of Data Reviewed  Tests in the radiology section of CPT®: ordered and reviewed  Independent visualization of images, tracings, or specimens: yes        Disposition  Final diagnoses:   Right ankle sprain     Time reflects when diagnosis was documented in both MDM as applicable and the Disposition within this note     Time User Action Codes Description Comment    9/29/2022  7:30 PM Kwesi Rowland Add [B28 [id number]] Right ankle sprain       ED Disposition     ED Disposition   Discharge    Condition   Stable    Date/Time   Thu Sep 29, 2022  7:30 PM    [facility] discharge to home/self care                 Follow-up Information     Follow up With Specialties Details Why Contact Info Additional Information    Bert Weaver MD Family Medicine Call in 3 days For follow up [address] [facility] [address]  [address]        [address] [facility] Orthopedic Surgery Call in 3 days For follow up [address]  [address], [address] [address] [place], [place], [address]    [facility] Emergency Department Emergency Medicine Go to  If symptoms worsen [address] [facility] [address] [facility] Emergency Department, [address] [place], [place], [address]          Discharge Medication List as of 9/29/2022  7:31 PM      CONTINUE these medications which have NOT CHANGED    Details   B Complex-C-Folic Acid ([facility]ici[facility]Ryan Rx) 1 MG TABS TAKE 1 TABLET BY MOUTH DAILY WITH BREAKFAST, Normal      baclofen 10 mg tablet Take 1 tablet (10 mg total) by mouth 3 (three) times a day, Starting Mon 2/28/2022, Normal      EPINEPHrine (EpiPen 2-[place]) 0 3 mg/0 3 mL SOAJ Inject 0 3 mL (0 3 mg total) into a muscle once for 1 dose, Starting Wed 8/11/2021, Normal      ferrous sulfate 324 (65 Fe) mg TAKE 1 TABLET (324 MG TOTAL) BY MOUTH DAILY BEFORE BREAKFAST, Starting Wed 6/15/2022, Normal      lisinopril (ZESTRIL) 5 mg tablet TAKE 1 TABLET (5 MG TOTAL) BY MOUTH DAILY, Starting Thu 7/7/2022, Normal      perphenazine 8 mg tablet Take 8 mg by mouth daily at bedtime, Starting Thu 8/5/2021, Historical Med      sertraline (ZOLOFT) 50 mg tablet TAKE 1 TABLET (50 MG TOTAL) BY MOUTH DAILY, Starting Fri 6/10/2022, Normal      ziprasidone (GEODON) 80 mg capsule Take 80 mg by mouth daily at bedtime, Starting Thu 8/5/2021, Historical Med             No discharge procedures on file      PDMP Review     None          ED Provider  Electronically Signed by           Herb Thrasher PA-C  09/29/22 6853

## 2022-09-29 NOTE — DISCHARGE INSTRUCTIONS
Follow up with PCP  Follow up with orthopedics  Tylenol/motrin as needed for pain  Rest, ice, elevation  Return to the ED with new or worsening symptoms including but not limited to worsening pain, decreased range of motion, decreased sensation

## 2022-10-13 DIAGNOSIS — I10 ESSENTIAL HYPERTENSION: ICD-10-CM

## 2022-10-13 RX ORDER — LISINOPRIL 5 MG/1
5 TABLET ORAL DAILY
Qty: 90 TABLET | Refills: 1 | Status: SHIPPED | OUTPATIENT
Start: 2022-10-13

## 2022-11-07 DIAGNOSIS — F31.60 BIPOLAR AFFECTIVE DISORDER, CURRENT EPISODE MIXED, CURRENT EPISODE SEVERITY UNSPECIFIED (HCC): ICD-10-CM

## 2022-11-07 DIAGNOSIS — Z86.718 HISTORY OF DEEP VEIN THROMBOSIS (DVT) DURING PREGNANCY: ICD-10-CM

## 2022-11-07 DIAGNOSIS — Z87.59 HISTORY OF DEEP VEIN THROMBOSIS (DVT) DURING PREGNANCY: ICD-10-CM

## 2022-11-07 DIAGNOSIS — R56.9 SEIZURE (HCC): ICD-10-CM

## 2022-11-08 RX ORDER — FERROUS SULFATE TAB EC 324 MG (65 MG FE EQUIVALENT) 324 (65 FE) MG
324 TABLET DELAYED RESPONSE ORAL
Qty: 90 TABLET | Refills: 0 | Status: SHIPPED | OUTPATIENT
Start: 2022-11-08

## 2022-12-07 DIAGNOSIS — F31.60 BIPOLAR AFFECTIVE DISORDER, CURRENT EPISODE MIXED, CURRENT EPISODE SEVERITY UNSPECIFIED (HCC): ICD-10-CM

## 2023-03-01 ENCOUNTER — TELEPHONE (OUTPATIENT)
Dept: NEUROLOGY | Facility: CLINIC | Age: 28
End: 2023-03-01

## 2023-03-01 NOTE — TELEPHONE ENCOUNTER
patient called to reschedule due to transportation, she advised that her ex  schedule the appointment  She asked for diann santana in [place], I offered her the next available 7-28-23 at 330 pm with Dr Scarlet Velasquez and added her to the wait list for both Scarlet Velasquez and Hugo Paz and she accepted  She also provided her new phone number [phone]

## 2023-03-07 DIAGNOSIS — I10 ESSENTIAL HYPERTENSION: ICD-10-CM

## 2023-03-07 RX ORDER — LISINOPRIL 5 MG/1
5 TABLET ORAL DAILY
Qty: 90 TABLET | Refills: 1 | Status: SHIPPED | OUTPATIENT
Start: 2023-03-07

## 2023-03-16 DIAGNOSIS — F31.60 BIPOLAR AFFECTIVE DISORDER, CURRENT EPISODE MIXED, CURRENT EPISODE SEVERITY UNSPECIFIED (HCC): ICD-10-CM

## 2023-05-05 NOTE — ASSESSMENT & PLAN NOTE
Denies LOF, VB, or CTX  Reports good fetal movement  She did not go to cardiology appointment that was scheduled for 3/28 (baseline ECHO ordered by MFM for indication of multiple medical comorbidities)  The couple says that they plan to call cardiology today to reschedule the appointment  They confirm that they have the phone number needed to call  RTO 2 weeks  Patient ID: Ramon Thorpe is a 51 y.o. male who presents for Annual Exam.    Assessment/Plan     Problem List Items Addressed This Visit          Circulatory    Raynaud's disease without gangrene    Relevant Orders    CBC    Uric Acid       Endocrine/Metabolic    Impaired fasting glucose     Low-carb diet         Relevant Orders    Hemoglobin A1C    Vitamin D deficiency     Advice your vitamin D level is low take vitamin C calcium plus vitamin D 50,000 unit one every week for 3 months and repeat testing or 3 months         Relevant Orders    Vitamin D, Total       Other    Hyperlipidemia     Low-fat diet         Relevant Orders    Lipid Panel    Comprehensive Metabolic Panel    Annual visit for general adult medical examination with abnormal findings - Primary     51-year-old male advised skin cancer prostate cancer testicular cancer colon cancer screening flu pneumonia COVID-19 vaccines follow-up 6 months refer patient to GI         Relevant Orders    Vitamin D, Total    Prostate Specific Antigen, Screen    Urinalysis Microscopic Only    TSH with reflex to Free T4 if abnormal    Lipid Panel    Comprehensive Metabolic Panel    CBC    Colonoscopy    Hemoglobin A1C    Uric Acid    Right knee pain     Posttraumatic injury advised x-ray of the right knee meloxicam 50 mg a day side effect explained take vitamin C vitamin D calcium supplement physical therapy follow-up          Other Visit Diagnoses       Prostate cancer screening        Acquired hypothyroidism        Relevant Orders    TSH with reflex to Free T4 if abnormal    Screen for colon cancer        Relevant Orders    Colonoscopy            Source of history: Nurse, Medical personnel, Medical record, Patient.  History limitation: None.      HPI  51-year-old patient  with children personal history of metabolic syndrome hyperglycemia hyperlipidemia vitamin D deficiency play volleyball moderate exercise developing post exercise posttraumatic right knee pain  "injury swelling and pain onset gradually duration few weeks progressed slowly aggravated by activity relieved with the rest associated problem vitamin D deficiency      No Known Allergies    Medications    Current Outpatient Medications   Medication Sig Dispense Refill    ergocalciferol (Vitamin D-2) 1.25 MG (86633 UT) capsule Take 1 capsule (1,250 mcg) by mouth.       No current facility-administered medications for this visit.       Objective   Visit Vitals  /66 (BP Location: Left arm, Patient Position: Sitting, BP Cuff Size: Adult)   Pulse 78   Temp 36.3 °C (97.4 °F)   Ht 1.753 m (5' 9\")   Wt 72.6 kg (160 lb)   SpO2 98%   BMI 23.63 kg/m²   Smoking Status Never   BSA 1.88 m²       PHYSICAL EXAM  General: NAD. NCAT. Aox3   HEENT: PERRLA. EOMI. MMM. Nares patent bl.  Cardiovascular: RRR. No MRG. S1/S2 wnl.   Respiratory: CTABL. No acute respiratory distress.   GI: Soft, NT abdomen. BS present x 4.   : No CVAT BL  MSK: ROM x 4. CTLS non-tender.   Extremities: No edema. Cap refill < 2 sec.   Skin: No rashes or bruises.   Neuro: Aox3. Cranial Nerves grossly intact. Motor/sensory wnl.   Psych: Mood wnl.      I spent 15 minutes face-to-face. This patient discussing cardiovascular risk and behavior therapy of nutrition choices, exercise, and elevation of the habits contributing to risk. We agree on plan how they may be able to reduce the current cardiovascular risk for patient with risk calculation more than 10% aspirin use was discussed and encouraged unless no one allergy or increased discomfort bleeding or contraindication use of aspirin    ROS  Constitutional: Denies fevers, chills, fatigue, weight loss/gain  HEENT: Denies HA, vision changes, hearing loss, sore throat  Cardiac: Denies CP, palpitations, edema  Respiratory: Denies SOB, cough, pleuritic chest pain, PND, orthopnea  GI: Denies N/V/D, abd pain, constipation, black/bloody stools  : Denies urinary changes, frequency, hematuria, urgency, retention, " flank pain  MSK: Denies joint pain, joint swelling, back pain, neck pain, extremity pain  Neuro: Denies numbness, weakness, tingling    Immunization History   Administered Date(s) Administered    Judith SARS-CoV-2 Vaccination 12/08/2021    MMR 02/20/2001    Moderna SARS-CoV-2 Vaccination 01/02/2022    Pfizer Purple Cap SARS-CoV-2 03/22/2021, 04/12/2021    Td (adult), unspecified 02/20/2001, 02/20/2001       No visits with results within 4 Month(s) from this visit.   Latest known visit with results is:   Legacy Encounter on 09/09/2018   Component Date Value Ref Range Status    Cholesterol 09/09/2018 190  0 - 199 mg/dL Final    HDL 09/09/2018 71.4  mg/dL Final    Cholesterol/HDL Ratio 09/09/2018 2.7   Final    LDL 09/09/2018 109 (H)  0 - 99 mg/dL Final    VLDL 09/09/2018 10  0 - 40 mg/dL Final    Triglycerides 09/09/2018 49  0 - 149 mg/dL Final    WBC 09/09/2018 5.5  4.4 - 11.3 x10E9/L Final    nRBC 09/09/2018 0.0  0.0 - 0.0 /100 WBC Final    RBC 09/09/2018 4.30 (L)  4.50 - 5.90 x10E12/L Final    Hemoglobin 09/09/2018 13.4 (L)  13.5 - 17.5 g/dL Final    Hematocrit 09/09/2018 40.2 (L)  41.0 - 52.0 % Final    MCV 09/09/2018 93  80 - 100 fL Final    MCHC 09/09/2018 33.3  32.0 - 36.0 g/dL Final    Platelets 09/09/2018 241  150 - 450 x10E9/L Final    RDW 09/09/2018 12.8  11.5 - 14.5 % Final    Neutrophils % 09/09/2018 50.2  40.0 - 80.0 % Final    Immature Granulocytes %, Automated 09/09/2018 0.2  0.0 - 0.9 % Final    Lymphocytes % 09/09/2018 40.9  13.0 - 44.0 % Final    Monocytes % 09/09/2018 6.9  2.0 - 10.0 % Final    Eosinophils % 09/09/2018 1.1  0.0 - 6.0 % Final    Basophils % 09/09/2018 0.7  0.0 - 2.0 % Final    Neutrophils Absolute 09/09/2018 2.78  1.20 - 7.70 x10E9/L Final    Lymphocytes Absolute 09/09/2018 2.26  1.20 - 4.80 x10E9/L Final    Monocytes Absolute 09/09/2018 0.38  0.10 - 1.00 x10E9/L Final    Eosinophils Absolute 09/09/2018 0.06  0.00 - 0.70 x10E9/L Final    Basophils Absolute 09/09/2018 0.04  0.00 -  0.10 x10E9/L Final    Glucose 09/09/2018 99  74 - 99 mg/dL Final    Sodium 09/09/2018 139  136 - 145 mmol/L Final    Potassium 09/09/2018 4.3  3.5 - 5.3 mmol/L Final    Chloride 09/09/2018 104  98 - 107 mmol/L Final    Bicarbonate 09/09/2018 28  21 - 32 mmol/L Final    Anion Gap 09/09/2018 11  10 - 20 mmol/L Final    Urea Nitrogen 09/09/2018 12  6 - 23 mg/dL Final    Creatinine 09/09/2018 0.86  0.50 - 1.30 mg/dL Final    GLOMERULAR FILTRATION RATE-NON AFR* 09/09/2018 >60  >60 mL/min/1.73m2 Final    GLOMERULAR FILTRATION RATE-* 09/09/2018 >60  >60 mL/min/1.73m2 Final    Calcium 09/09/2018 9.9  8.6 - 10.6 mg/dL Final    Albumin 09/09/2018 4.6  3.4 - 5.0 g/dL Final    Alkaline Phosphatase 09/09/2018 73  33 - 120 U/L Final    Total Protein 09/09/2018 7.3  6.4 - 8.2 g/dL Final    AST 09/09/2018 14  9 - 39 U/L Final    Total Bilirubin 09/09/2018 2.5 (H)  0.0 - 1.2 mg/dL Final    ALT (SGPT) 09/09/2018 16  10 - 52 U/L Final    Vitamin B-12 09/09/2018 1,837 (H)  211 - 911 pg/mL Final    TSH 09/09/2018 2.30  0.44 - 3.98 mIU/L Final    Hemoglobin A1C 09/09/2018 5.4  % Final    Estimated Average Glucose 09/09/2018 108  MG/DL Final    Vitamin D, 25-Hydroxy 09/09/2018 23 (A)  ng/mL Final       Radiology: Reviewed imaging in powerchart.  No results found.    Family History   Problem Relation Name Age of Onset    Liver disease Father       Social History     Socioeconomic History    Marital status:      Spouse name: None    Number of children: None    Years of education: None    Highest education level: None   Occupational History    None   Tobacco Use    Smoking status: Never    Smokeless tobacco: Never   Vaping Use    Vaping status: None   Substance and Sexual Activity    Alcohol use: Never    Drug use: Never    Sexual activity: None   Other Topics Concern    None   Social History Narrative    None     Social Determinants of Health     Financial Resource Strain: Not on file   Food Insecurity: Not on file    Transportation Needs: Not on file   Physical Activity: Not on file   Stress: Not on file   Social Connections: Not on file   Intimate Partner Violence: Not on file   Housing Stability: Not on file     Past Medical History:   Diagnosis Date    Deficiency of other specified B group vitamins 03/23/2020    Vitamin B12 deficiency    Other conditions influencing health status 11/26/2017    Knee fracture, left    Other malaise 05/24/2019    Malaise and fatigue    Pain in leg, unspecified 08/13/2014    Leg pain    Pain in right knee 11/26/2017    Arthralgia of both knees    Personal history of diseases of the blood and blood-forming organs and certain disorders involving the immune mechanism 05/24/2019    History of anemia    Personal history of other diseases of the respiratory system 03/23/2020    History of acute pharyngitis    Personal history of other diseases of the respiratory system 03/23/2020    History of common cold    Personal history of other infectious and parasitic diseases 12/20/2019    History of tinea cruris     Past Surgical History:   Procedure Laterality Date    OTHER SURGICAL HISTORY  08/30/2021    No history of surgery     * Cannot find OR log *    Charting was completed using voice recognition technology and may include unintended errors.

## 2023-07-10 ENCOUNTER — TELEPHONE (OUTPATIENT)
Dept: NEUROLOGY | Facility: CLINIC | Age: 28
End: 2023-07-10

## 2023-07-10 NOTE — TELEPHONE ENCOUNTER
Attempted to call patient but phone not in service. Tried to reschedule appt to a later date due to admin time however patient's referral expires 08/18/23. Will  Need to overbook beforehand if no cancellations.

## 2023-07-15 DIAGNOSIS — F31.60 BIPOLAR AFFECTIVE DISORDER, CURRENT EPISODE MIXED, CURRENT EPISODE SEVERITY UNSPECIFIED (HCC): ICD-10-CM

## 2023-07-25 NOTE — PROGRESS NOTES
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 [facility])    Right hemiparesis (HCC)    Bipolar affective disorder, current episode mixed, current episode severity unspecified ([address] )        Seizure disorder  Discussed plan care with patient and family member, has this been a long-term issue with no definitive witnessed accounts recommend evaluation with Neurology  CVA (in utero), right hemiparesis  Stable, no change  Bipolar disorder  For history recommend evaluation with Psychiatry, given names and numbers patient instructed to follow up, family member agrees  htn  Stable, to continue current medications      Subjective:      Patient ID: Aliyah Ricci is a 22 y o  female  Here with younger sister Deborah Armenta ,   In law of [employer] , my son has the sz's    Has been having episodes of LOC for months / ever since I had youngest son 2 3 yr old   I was dx with sz disorder many yrs ago , has never been on any medication ? ,   Patient reports that none of these episodes were witnessed, no previous evaluation with Neurology, diagnosis was established by previous primary care?   Recalls event clearly denies any episodes of incontinence nausea vomiting, negative vision changes  Denies any episodes of headache chest pain migraine  Events are precipitated by nothing  Resolved spontaneously and then informs family  Sister  in room, concurs    Previous history stroke affecting right side at birth  History of DVTs in the past was placed on Xarelto, does not recall what provider denies any issues with bleeding bruising negative vision changes negative headache  Does not recall evaluation with hematologist    Bipolar disorder , taking the meds , has appt with psych next week [id number] ,  [place]                 The following portions of the patient's history were reviewed and updated as appropriate:   She has a past medical history of Anxiety, Bipolar affective disorder ([facility] ) (2017), [facility] ), Chronic hypertension with superimposed preeclampsia (2017), Depression, DVT (deep venous thrombosis) ([facility] ) (), Intrauterine growth restriction affecting care of mother, antepartum, third trimester, fetus 1, Migraine, Multiple gestation, Right hemiparesis ([facility] ), [facility]ures ([facility] ), Severe preeclampsia, third trimester, Spontaneous , and Varicella  ,  does not have any pertinent problems on file  ,   has a past surgical history that includes Cholecystectomy; pr  delivery only (N/A, 2017);  section; pr  delivery only (N/A, 2018); and pr ligation,fallopian tube w/ (Bilateral, 2018)  ,  family history includes Deep vein thrombosis in her father and mother; Depression in her mother and sister; Diabetes in her maternal grandmother; Heart disease in her sister; Hypertension in her mother; Mental illness in her mother and sister; Pulmonary embolism in her father and mother; Thyroid disease in her mother  ,   reports that she has been smoking  She has never used smokeless tobacco  She reports that she does not drink alcohol or use drugs  ,  is allergic to bee venom; other; penicillins; and flu virus vaccine         Review of Systems   Constitutional: Negative for appetite change, chills, fever and unexpected weight change  HENT: Negative for congestion, dental problem, ear pain, hearing loss, postnasal drip, rhinorrhea, sinus pressure, sinus pain, sneezing, sore throat, tinnitus and voice change  Eyes: Negative for visual disturbance  Respiratory: Negative for apnea, cough, chest tightness and shortness of breath  Cardiovascular: Negative for chest pain, palpitations and leg swelling  Gastrointestinal: Negative for abdominal pain, blood in stool, constipation, diarrhea, nausea and vomiting     Endocrine: Negative for cold intolerance, heat intolerance, polydipsia, polyphagia and polyuria  Genitourinary: Negative for decreased urine volume, difficulty urinating, dysuria, frequency and hematuria  Musculoskeletal: Negative for arthralgias, back pain, gait problem, joint swelling and myalgias  Skin: Negative for color change, rash and wound  Allergic/Immunologic: Negative for environmental allergies and food allergies  Neurological: Negative for dizziness, syncope, weakness, light-headedness, numbness and headaches  Hematological: Negative for adenopathy  Does not bruise/bleed easily  Psychiatric/Behavioral: Negative for sleep disturbance and suicidal ideas  The patient is not nervous/anxious  Objective:  Vitals:    08/29/19 0917   BP: 120/90   Pulse: 94   Resp:    Temp:    SpO2:       Physical Exam   Constitutional: She is oriented to person, place, and time  She appears well-developed and well-nourished  No distress  Obese   HENT:   Head: Normocephalic and atraumatic  Right Ear: External ear normal    Left Ear: External ear normal    Eyes: Conjunctivae are normal  No scleral icterus  Neck: Normal range of motion  Neck supple  No thyromegaly present  Cardiovascular: Normal rate, regular rhythm and normal heart sounds  Pulmonary/Chest: Effort normal and breath sounds normal    Lymphadenopathy:     She has no cervical adenopathy  Neurological: She is alert and oriented to person, place, and time  She has normal reflexes  Right-sided weakness secondary to cva   Skin: Skin is warm and dry  Psychiatric: She has a normal mood and affect   Her behavior is normal  Judgment and thought content normal  never

## 2023-08-29 ENCOUNTER — HOSPITAL ENCOUNTER (EMERGENCY)
Facility: HOSPITAL | Age: 28
Discharge: HOME | End: 2023-08-29
Attending: EMERGENCY MEDICINE | Admitting: EMERGENCY MEDICINE
Payer: COMMERCIAL

## 2023-08-29 VITALS
RESPIRATION RATE: 26 BRPM | DIASTOLIC BLOOD PRESSURE: 78 MMHG | SYSTOLIC BLOOD PRESSURE: 132 MMHG | HEART RATE: 74 BPM | OXYGEN SATURATION: 97 %

## 2023-08-29 DIAGNOSIS — R56.9 SEIZURE (HCC): Primary | ICD-10-CM

## 2023-08-29 LAB
ALBUMIN SERPL BCP-MCNC: 3.9 G/DL (ref 3.5–5)
ALP SERPL-CCNC: 67 U/L (ref 34–104)
ALT SERPL W P-5'-P-CCNC: 16 U/L (ref 7–52)
ANION GAP SERPL CALCULATED.3IONS-SCNC: 7 MMOL/L
AST SERPL W P-5'-P-CCNC: 14 U/L (ref 13–39)
BASOPHILS # BLD AUTO: 0.03 THOUSANDS/ÂΜL (ref 0–0.1)
BASOPHILS NFR BLD AUTO: 0 % (ref 0–1)
BILIRUB SERPL-MCNC: 0.37 MG/DL (ref 0.2–1)
BUN SERPL-MCNC: 10 MG/DL (ref 5–25)
CALCIUM SERPL-MCNC: 9.2 MG/DL (ref 8.4–10.2)
CARDIAC TROPONIN I PNL SERPL HS: <2 NG/L
CHLORIDE SERPL-SCNC: 106 MMOL/L (ref 96–108)
CO2 SERPL-SCNC: 25 MMOL/L (ref 21–32)
CREAT SERPL-MCNC: 0.59 MG/DL (ref 0.6–1.3)
EOSINOPHIL # BLD AUTO: 0.15 THOUSAND/ÂΜL (ref 0–0.61)
EOSINOPHIL NFR BLD AUTO: 1 % (ref 0–6)
ERYTHROCYTE [DISTWIDTH] IN BLOOD BY AUTOMATED COUNT: 13.8 % (ref 11.6–15.1)
GFR SERPL CREATININE-BSD FRML MDRD: 126 ML/MIN/1.73SQ M
GLUCOSE SERPL-MCNC: 99 MG/DL (ref 65–140)
HCT VFR BLD AUTO: 37.4 % (ref 34.8–46.1)
HGB BLD-MCNC: 12.4 G/DL (ref 11.5–15.4)
IMM GRANULOCYTES # BLD AUTO: 0.05 THOUSAND/UL (ref 0–0.2)
IMM GRANULOCYTES NFR BLD AUTO: 1 % (ref 0–2)
INR PPP: 1.07 (ref 0.84–1.19)
LACTATE SERPL-SCNC: 1.3 MMOL/L (ref 0.5–2)
LYMPHOCYTES # BLD AUTO: 2.64 THOUSANDS/ÂΜL (ref 0.6–4.47)
LYMPHOCYTES NFR BLD AUTO: 25 % (ref 14–44)
MCH RBC QN AUTO: 26.8 PG (ref 26.8–34.3)
MCHC RBC AUTO-ENTMCNC: 33.2 G/DL (ref 31.4–37.4)
MCV RBC AUTO: 81 FL (ref 82–98)
MONOCYTES # BLD AUTO: 0.51 THOUSAND/ÂΜL (ref 0.17–1.22)
MONOCYTES NFR BLD AUTO: 5 % (ref 4–12)
NEUTROPHILS # BLD AUTO: 7.27 THOUSANDS/ÂΜL (ref 1.85–7.62)
NEUTS SEG NFR BLD AUTO: 68 % (ref 43–75)
NRBC BLD AUTO-RTO: 0 /100 WBCS
PLATELET # BLD AUTO: 430 THOUSANDS/UL (ref 149–390)
PMV BLD AUTO: 8.8 FL (ref 8.9–12.7)
POTASSIUM SERPL-SCNC: 3.5 MMOL/L (ref 3.5–5.3)
PROT SERPL-MCNC: 7.9 G/DL (ref 6.4–8.4)
PROTHROMBIN TIME: 14.5 SECONDS (ref 11.6–14.5)
RBC # BLD AUTO: 4.63 MILLION/UL (ref 3.81–5.12)
SODIUM SERPL-SCNC: 138 MMOL/L (ref 135–147)
WBC # BLD AUTO: 10.65 THOUSAND/UL (ref 4.31–10.16)

## 2023-08-29 PROCEDURE — 83605 ASSAY OF LACTIC ACID: CPT | Performed by: EMERGENCY MEDICINE

## 2023-08-29 PROCEDURE — 96361 HYDRATE IV INFUSION ADD-ON: CPT

## 2023-08-29 PROCEDURE — 93005 ELECTROCARDIOGRAM TRACING: CPT

## 2023-08-29 PROCEDURE — 36415 COLL VENOUS BLD VENIPUNCTURE: CPT | Performed by: EMERGENCY MEDICINE

## 2023-08-29 PROCEDURE — 84484 ASSAY OF TROPONIN QUANT: CPT | Performed by: EMERGENCY MEDICINE

## 2023-08-29 PROCEDURE — 99284 EMERGENCY DEPT VISIT MOD MDM: CPT | Performed by: EMERGENCY MEDICINE

## 2023-08-29 PROCEDURE — 85610 PROTHROMBIN TIME: CPT | Performed by: EMERGENCY MEDICINE

## 2023-08-29 PROCEDURE — 80053 COMPREHEN METABOLIC PANEL: CPT | Performed by: EMERGENCY MEDICINE

## 2023-08-29 PROCEDURE — 80177 DRUG SCRN QUAN LEVETIRACETAM: CPT | Performed by: EMERGENCY MEDICINE

## 2023-08-29 PROCEDURE — 99284 EMERGENCY DEPT VISIT MOD MDM: CPT

## 2023-08-29 PROCEDURE — 96365 THER/PROPH/DIAG IV INF INIT: CPT

## 2023-08-29 PROCEDURE — 85025 COMPLETE CBC W/AUTO DIFF WBC: CPT | Performed by: EMERGENCY MEDICINE

## 2023-08-29 RX ORDER — LEVETIRACETAM 500 MG/1
1500 TABLET ORAL EVERY 12 HOURS SCHEDULED
Qty: 180 TABLET | Refills: 0 | Status: SHIPPED | OUTPATIENT
Start: 2023-08-29 | End: 2023-09-28

## 2023-08-29 RX ADMIN — SODIUM CHLORIDE 1000 ML: 0.9 INJECTION, SOLUTION INTRAVENOUS at 17:27

## 2023-08-29 RX ADMIN — LEVETIRACETAM 1000 MG: 100 INJECTION, SOLUTION INTRAVENOUS at 17:27

## 2023-08-30 LAB
ATRIAL RATE: 82 BPM
P AXIS: 40 DEGREES
PR INTERVAL: 164 MS
QRS AXIS: 56 DEGREES
QRSD INTERVAL: 84 MS
QT INTERVAL: 392 MS
QTC INTERVAL: 457 MS
T WAVE AXIS: 35 DEGREES
VENTRICULAR RATE: 82 BPM

## 2023-08-30 PROCEDURE — 93010 ELECTROCARDIOGRAM REPORT: CPT | Performed by: INTERNAL MEDICINE

## 2023-08-30 NOTE — ED PROVIDER NOTES
History  Chief Complaint   Patient presents with   • Medical Problem     Family reports an episode where patient was not acting normally, and now she will not speak. No other deficits. Is able to read and communicate nonverbally     33 year female pt with history of epileptic seizures comes in with a breakthrough seizure. The patient states that the onset of seizures the same as her normal, her postictal state exam was normal, and currently only complains of a headache which again she states is normal.  She is on Keppra 1000 mg twice daily and has had several breakthrough seizures. Plan will be for basic labs and if that is normal we will adjust her dose of Keppra and have her see epileptology. History provided by:  Patient   used: No    Medical Problem  Severity:  Mild  Onset quality:  Gradual  Timing:  Constant  Progression:  Worsening  Chronicity:  New  Associated symptoms: no chest pain, no cough and no sore throat        Prior to Admission Medications   Prescriptions Last Dose Informant Patient Reported? Taking?    B Complex-C-Folic Acid ([facility]) 1 MG TABS   No No   Sig: TAKE 1 TABLET BY MOUTH DAILY WITH BREAKFAST   EPINEPHrine (EpiPen 2-[place]) 0.3 mg/0.3 mL SOAJ   No No   Sig: Inject 0.3 mL (0.3 mg total) into a muscle once for 1 dose   Patient not taking: Reported on 2/28/2022    baclofen 10 mg tablet   No No   Sig: Take 1 tablet (10 mg total) by mouth 3 (three) times a day   ferrous sulfate 324 (65 Fe) mg   No No   Sig: TAKE 1 TABLET (324 MG TOTAL) BY MOUTH DAILY BEFORE BREAKFAST   lisinopril (ZESTRIL) 5 mg tablet   No No   Sig: TAKE 1 TABLET (5 MG TOTAL) BY MOUTH DAILY   perphenazine 8 mg tablet   Yes No   Sig: Take 8 mg by mouth daily at bedtime   sertraline (ZOLOFT) 50 mg tablet   No No   Sig: TAKE 1 TABLET (50 MG TOTAL) BY MOUTH DAILY   ziprasidone (GEODON) 80 mg capsule   Yes No   Sig: Take 80 mg by mouth daily at bedtime      Facility-Administered Medications: None Past Medical History:   Diagnosis Date   • Anxiety     takes Zoloft; stopped during pregnancy    • Bipolar affective disorder ([address]) 2017   • Cardiac asystole (HCC)    • Chronic hypertension in obstetric context in third trimester 2018   • Chronic hypertension with superimposed preeclampsia 2017   • Depression    • DVT (deep venous thrombosis) ([address])     LLE-post cholecystectomy   • Intrauterine growth restriction affecting care of mother, antepartum, third trimester, fetus 1     wi9th prior preg   • Migraine    • Multiple gestation    • Right hemiparesis ([address])     stroke in utero   • Seizures ([address])     in 2016   • Severe preeclampsia, third trimester    • Spontaneous      seven times   • Varicella     in childhood        Past Surgical History:   Procedure Laterality Date   •  SECTION      x3   • CHOLECYSTECTOMY     • HI  DELIVERY ONLY N/A 2017    2015 daughter 32 weeks, 2016 son, 2017 twin son and daughter, 30 weeks   • HI  DELIVERY ONLY N/A 2018    Procedure:  SECTION () REPEAT;  Surgeon: Lawrence Paez MD;  Location: [facility] ;  Service: Obstetrics   • HI LIGATION,FALLOPIAN TUBE W/ Bilateral 2018    Procedure: LIGATION/COAGULATION TUBAL;  Surgeon: Lawrence Paez MD;  Location: [facility] ;  Service: Obstetrics       Family History   Problem Relation Age of Onset   • Pulmonary embolism Mother    • Deep vein thrombosis Mother    • Depression Mother    • Hypertension Mother    • Thyroid disease Mother    • Mental illness Mother         bipolar   • Diabetes Maternal Grandmother    • Deep vein thrombosis Father    • Pulmonary embolism Father    • Depression Sister    • Heart disease Sister    • Mental illness Sister         bipolar schizo     I have reviewed and agree with the history as documented.     E-Cigarette/Vaping     E-Cigarette/Vaping Substances     Social History     Tobacco Use   • Smoking status: Former   • Smokeless tobacco: Never   Substance Use Topics   • Alcohol use: Never   • Drug use: No       Review of Systems   HENT: Negative for sore throat. Respiratory: Negative for cough. Cardiovascular: Negative for chest pain. All other systems reviewed and are negative. Physical Exam  Physical Exam  Vitals and nursing note reviewed. Constitutional:       Appearance: She is well-developed. HENT:      Head: Normocephalic and atraumatic. Right Ear: External ear normal.      Left Ear: External ear normal.   Eyes:      Conjunctiva/sclera: Conjunctivae normal.   Neck:      Thyroid: No thyromegaly. Vascular: No JVD. Trachea: No tracheal deviation. Cardiovascular:      Rate and Rhythm: Normal rate. Pulmonary:      Effort: Pulmonary effort is normal.      Breath sounds: Normal breath sounds. No stridor. Abdominal:      General: There is no distension. Palpations: Abdomen is soft. There is no mass. Tenderness: There is no abdominal tenderness. There is no guarding. Hernia: No hernia is present. Musculoskeletal:         General: No tenderness or deformity. Normal range of motion. Lymphadenopathy:      Cervical: No cervical adenopathy. Skin:     General: Skin is warm. Coloration: Skin is not pale. Findings: No erythema or rash. Neurological:      Mental Status: She is alert and oriented to person, place, and time.    Psychiatric:         Behavior: Behavior normal.         Vital Signs  ED Triage Vitals [08/29/23 1654]   Temp Pulse Respirations Blood Pressure SpO2   -- 91 17 162/84 96 %      Temp src Heart Rate Source Patient Position - Orthostatic VS BP Location FiO2 (%)   -- -- -- -- --      Pain Score       --           Vitals:    08/29/23 1654 08/29/23 1700 08/29/23 1830   BP: 162/84 157/89 132/78   Pulse: 91 94 74         Visual Acuity      ED Medications  Medications   levETIRAcetam (KEPPRA) 1,000 mg in sodium chloride 0.9 % 100 mL IVPB (0 mg Intravenous Stopped 8/29/23 1806)   sodium chloride 0.9 % bolus 1,000 mL (0 mL Intravenous Stopped 8/29/23 1851)       Diagnostic Studies  Results Reviewed     Procedure Component Value Units Date/Time    HS Troponin 0hr (reflex protocol) [[id number]]  (Normal) Collected: 08/29/23 1723    Lab Status: Final result Specimen: Blood from Arm, Left Updated: 08/29/23 1754     hs TnI 0hr <2 ng/L     Protime-INR [[id number]]  (Normal) Collected: 08/29/23 1723    Lab Status: Final result Specimen: Blood from Arm, Left Updated: 08/29/23 1747     Protime 14.5 seconds      INR 1.07    Comprehensive metabolic panel [[id number]]  (Abnormal) Collected: 08/29/23 1723    Lab Status: Final result Specimen: Blood from Arm, Left Updated: 08/29/23 1746     Sodium 138 mmol/L      Potassium 3.5 mmol/L      Chloride 106 mmol/L      CO2 25 mmol/L      ANION GAP 7 mmol/L      BUN 10 mg/dL      Creatinine 0.59 mg/dL      Glucose 99 mg/dL      Calcium 9.2 mg/dL      AST 14 U/L      ALT 16 U/L      Alkaline Phosphatase 67 U/L      Total Protein 7.9 g/dL      Albumin 3.9 g/dL      Total Bilirubin 0.37 mg/dL      eGFR 126 ml/min/1.73sq m     Narrative:      [facility] Disease (CKD):   •  Stage 1 with normal or high GFR (GFR > 90 mL/min/1.73 square meters)  •  Stage 2 Mild CKD (GFR = 60-89 mL/min/1.73 square meters)  •  Stage 3A Moderate CKD (GFR = 45-59 mL/min/1.73 square meters)  •  Stage 3B Moderate CKD (GFR = 30-44 mL/min/1.73 square meters)  •  Stage 4 Severe CKD (GFR = 15-29 mL/min/1.73 square meters)  •  Stage 5 End Stage CKD (GFR <15 mL/min/1.73 square meters)  Note: GFR calculation is accurate only with a steady state creatinine    Lactic acid, plasma (w/reflex if result > 2.0) [[id number]]  (Normal) Collected: 08/29/23 1723    Lab Status: Final result Specimen: Blood from Arm, Left Updated: 08/29/23 1745     LACTIC ACID 1.3 mmol/L     Narrative:      Result may be elevated if tourniquet was used during collection. CBC and differential [[id number]]  (Abnormal) Collected: 08/29/23 1723    Lab Status: Final result Specimen: Blood from Arm, Left Updated: 08/29/23 1731     WBC 10.65 Thousand/uL      RBC 4.63 Million/uL      Hemoglobin 12.4 g/dL      Hematocrit 37.4 %      MCV 81 fL      MCH 26.8 pg      MCHC 33.2 g/dL      RDW 13.8 %      MPV 8.8 fL      Platelets 525 Thousands/uL      nRBC 0 /100 WBCs      Neutrophils Relative 68 %      Immat GRANS % 1 %      Lymphocytes Relative 25 %      Monocytes Relative 5 %      Eosinophils Relative 1 %      Basophils Relative 0 %      Neutrophils Absolute 7.27 Thousands/µL      Immature Grans Absolute 0.05 Thousand/uL      Lymphocytes Absolute 2.64 Thousands/µL      Monocytes Absolute 0.51 Thousand/µL      Eosinophils Absolute 0.15 Thousand/µL      Basophils Absolute 0.03 Thousands/µL     Levetiracetam level [[id number]] Collected: 08/29/23 1723    Lab Status: In process Specimen: Blood from Arm, Left Updated: 08/29/23 1727                 No orders to display              Procedures  Procedures         ED Course                                             Medical Decision Making  [facility]): acute illness or injury  Amount and/or Complexity of Data Reviewed  Labs: ordered. Risk  Prescription drug management. Disposition  Final diagnoses:   [facility])     Time reflects when diagnosis was documented in both MDM as applicable and the Disposition within this note     Time User Action Codes Description Comment    8/29/2023  6:48 PM Ami Parish Add [R56.9] [facility])       ED Disposition     ED Disposition   Discharge    Condition   Stable    Date/Time   Tue Aug 29, 2023  6:47 PM    [facility] discharge to home/self care.                Follow-up Information     Follow up With Specialties Details Why Contact Info    Daphnie Nix MD [facility]   [address]  [address]  [phone] Discharge Medication List as of 8/29/2023  6:49 PM      START taking these medications    Details   levETIRAcetam (Keppra) 500 mg tablet Take 3 tablets (1,500 mg total) by mouth every 12 (twelve) hours, Starting Tue 8/29/2023, Until Thu 9/28/2023, Normal         CONTINUE these medications which have NOT CHANGED    Details   B Complex-C-Folic Acid ([facility]) 1 MG TABS TAKE 1 TABLET BY MOUTH DAILY WITH BREAKFAST, Normal      baclofen 10 mg tablet Take 1 tablet (10 mg total) by mouth 3 (three) times a day, Starting Mon 2/28/2022, Normal      EPINEPHrine (EpiPen 2-[place]) 0.3 mg/0.3 mL SOAJ Inject 0.3 mL (0.3 mg total) into a muscle once for 1 dose, Starting Wed 8/11/2021, Normal      ferrous sulfate 324 (65 Fe) mg TAKE 1 TABLET (324 MG TOTAL) BY MOUTH DAILY BEFORE BREAKFAST, Starting Tue 11/8/2022, Normal      lisinopril (ZESTRIL) 5 mg tablet TAKE 1 TABLET (5 MG TOTAL) BY MOUTH DAILY, Starting Tue 3/7/2023, Normal      perphenazine 8 mg tablet Take 8 mg by mouth daily at bedtime, Starting Thu 8/5/2021, Historical Med      sertraline (ZOLOFT) 50 mg tablet TAKE 1 TABLET (50 MG TOTAL) BY MOUTH DAILY, Starting Mon 7/17/2023, Normal      ziprasidone (GEODON) 80 mg capsule Take 80 mg by mouth daily at bedtime, Starting Thu 8/5/2021, Historical Med             No discharge procedures on file.     PDMP Review     None          ED Provider  Electronically Signed by           Indra Ignacio DO  08/30/23 6371

## 2023-09-01 LAB — LEVETIRACETAM SERPL-MCNC: <2 UG/ML (ref 10–40)

## 2023-09-12 ENCOUNTER — VBI (OUTPATIENT)
Dept: ADMINISTRATIVE | Facility: OTHER | Age: 28
End: 2023-09-12

## 2023-09-26 ENCOUNTER — APPOINTMENT (EMERGENCY)
Dept: RADIOLOGY | Facility: HOSPITAL | Age: 28
End: 2023-09-26
Payer: COMMERCIAL

## 2023-09-26 ENCOUNTER — HOSPITAL ENCOUNTER (EMERGENCY)
Facility: HOSPITAL | Age: 28
Discharge: HOME | End: 2023-09-26
Attending: EMERGENCY MEDICINE
Payer: COMMERCIAL

## 2023-09-26 VITALS
SYSTOLIC BLOOD PRESSURE: 148 MMHG | RESPIRATION RATE: 16 BRPM | TEMPERATURE: 98.1 F | DIASTOLIC BLOOD PRESSURE: 74 MMHG | HEART RATE: 81 BPM | OXYGEN SATURATION: 99 %

## 2023-09-26 DIAGNOSIS — J06.9 VIRAL URI WITH COUGH: Primary | ICD-10-CM

## 2023-09-26 LAB
ATRIAL RATE: 78 BPM
BACTERIA UR QL AUTO: ABNORMAL /HPF
BILIRUB UR QL STRIP: NEGATIVE
CLARITY UR: ABNORMAL
COLOR UR: ABNORMAL
FLUAV RNA RESP QL NAA+PROBE: NEGATIVE
FLUBV RNA RESP QL NAA+PROBE: NEGATIVE
GLUCOSE UR STRIP-MCNC: NEGATIVE MG/DL
HGB UR QL STRIP.AUTO: NEGATIVE
KETONES UR STRIP-MCNC: NEGATIVE MG/DL
LEUKOCYTE ESTERASE UR QL STRIP: ABNORMAL
NITRITE UR QL STRIP: NEGATIVE
NON-SQ EPI CELLS URNS QL MICRO: ABNORMAL /HPF
P AXIS: 49 DEGREES
PH UR STRIP.AUTO: 5.5 [PH]
PR INTERVAL: 148 MS
PROT UR STRIP-MCNC: NEGATIVE MG/DL
QRS AXIS: 67 DEGREES
QRSD INTERVAL: 80 MS
QT INTERVAL: 390 MS
QTC INTERVAL: 444 MS
RBC #/AREA URNS AUTO: ABNORMAL /HPF
RSV RNA RESP QL NAA+PROBE: NEGATIVE
SARS-COV-2 RNA RESP QL NAA+PROBE: NEGATIVE
SP GR UR STRIP.AUTO: 1.01 (ref 1–1.03)
T WAVE AXIS: 57 DEGREES
UROBILINOGEN UR STRIP-ACNC: <2 MG/DL
VENTRICULAR RATE: 78 BPM
WBC #/AREA URNS AUTO: ABNORMAL /HPF

## 2023-09-26 PROCEDURE — 99285 EMERGENCY DEPT VISIT HI MDM: CPT | Performed by: EMERGENCY MEDICINE

## 2023-09-26 PROCEDURE — 93005 ELECTROCARDIOGRAM TRACING: CPT

## 2023-09-26 PROCEDURE — 0241U HB NFCT DS VIR RESP RNA 4 TRGT: CPT | Performed by: EMERGENCY MEDICINE

## 2023-09-26 PROCEDURE — 81001 URINALYSIS AUTO W/SCOPE: CPT | Performed by: EMERGENCY MEDICINE

## 2023-09-26 PROCEDURE — 93010 ELECTROCARDIOGRAM REPORT: CPT | Performed by: INTERNAL MEDICINE

## 2023-09-26 PROCEDURE — 71046 X-RAY EXAM CHEST 2 VIEWS: CPT

## 2023-09-26 PROCEDURE — 99285 EMERGENCY DEPT VISIT HI MDM: CPT

## 2023-09-26 NOTE — ED PROVIDER NOTES
History  Chief Complaint   Patient presents with   • Chest Pain     Pt with chest pain and vomiting that started this morning. 32 y/o female presents to the ED for cough x 2 days. States that it is nonproductive. Developed chest pain with coughing and vomiting yesterday. Denies any fever. Has had congestion, sore throat, and body aches. Has not tried anything for the symptoms. States that her significant other with similar. Denies any other complaints. History provided by:  Patient  Cough  Cough characteristics:  Non-productive  Severity:  Mild  Onset quality:  Sudden  Timing:  Constant  Progression:  Worsening  Chronicity:  New  Smoker: yes    Context: sick contacts    Relieved by:  None tried  Worsened by:  Nothing  Ineffective treatments:  None tried  Associated symptoms: chest pain and sore throat    Associated symptoms: no chills, no ear pain, no fever, no headaches, no rash, no shortness of breath and no wheezing        Prior to Admission Medications   Prescriptions Last Dose Informant Patient Reported? Taking?    B Complex-C-Folic Acid ([facility]ici[facility] Rx) 1 MG TABS   No No   Sig: TAKE 1 TABLET BY MOUTH DAILY WITH BREAKFAST   EPINEPHrine (EpiPen 2-[place]) 0.3 mg/0.3 mL SOAJ   No No   Sig: Inject 0.3 mL (0.3 mg total) into a muscle once for 1 dose   Patient not taking: Reported on 2/28/2022    baclofen 10 mg tablet   No No   Sig: Take 1 tablet (10 mg total) by mouth 3 (three) times a day   ferrous sulfate 324 (65 Fe) mg   No No   Sig: TAKE 1 TABLET (324 MG TOTAL) BY MOUTH DAILY BEFORE BREAKFAST   levETIRAcetam (Keppra) 500 mg tablet   No No   Sig: Take 3 tablets (1,500 mg total) by mouth every 12 (twelve) hours   lisinopril (ZESTRIL) 5 mg tablet   No No   Sig: TAKE 1 TABLET (5 MG TOTAL) BY MOUTH DAILY   perphenazine 8 mg tablet   Yes No   Sig: Take 8 mg by mouth daily at bedtime   sertraline (ZOLOFT) 50 mg tablet   No No   Sig: TAKE 1 TABLET (50 MG TOTAL) BY MOUTH DAILY   ziprasidone (GEODON) 80 mg capsule Yes No   Sig: Take 80 mg by mouth daily at bedtime      Facility-Administered Medications: None       Past Medical History:   Diagnosis Date   • Anxiety     takes Zoloft; stopped during pregnancy    • Bipolar affective disorder ([address]) 2017   • Cardiac asystole (HCC)    • Chronic hypertension in obstetric context in third trimester 2018   • Chronic hypertension with superimposed preeclampsia 2017   • Depression    • DVT (deep venous thrombosis) ([address]) 2016    LLE-post cholecystectomy   • Intrauterine growth restriction affecting care of mother, antepartum, third trimester, fetus 1     wi9th prior preg   • Migraine    • Multiple gestation    • Right hemiparesis ([address])     stroke in utero   • Seizures ([address])     in 2016   • Severe preeclampsia, third trimester    • Spontaneous      seven times   • Varicella     in childhood        Past Surgical History:   Procedure Laterality Date   •  SECTION      x3   • CHOLECYSTECTOMY     • NE  DELIVERY ONLY N/A 2017    2015 daughter 32 weeks, 2016 son, 2017 twin son and daughter, 30 weeks   • NE  DELIVERY ONLY N/A 2018    Procedure:  SECTION () REPEAT;  Surgeon: Ney Martínez MD;  [facility]: [facility] ;  Service: Obstetrics   • NE LIG/TRNSXJ FALOPIAN TUBE  DEL/ABDML SURG Bilateral 2018    Procedure: LIGATION/COAGULATION TUBAL;  Surgeon: Ney Martínez MD;  Location: [facility] ;  Service: Obstetrics       Family History   Problem Relation Age of Onset   • Pulmonary embolism Mother    • Deep vein thrombosis Mother    • Depression Mother    • Hypertension Mother    • Thyroid disease Mother    • Mental illness Mother         bipolar   • Diabetes Maternal Grandmother    • Deep vein thrombosis Father    • Pulmonary embolism Father    • Depression Sister    • Heart disease Sister    • Mental illness Sister         bipolar schizo     I have reviewed and agree with the history as documented. E-Cigarette/Vaping     E-Cigarette/Vaping Substances     Social History     Tobacco Use   • Smoking status: Former   • Smokeless tobacco: Never   Substance Use Topics   • Alcohol use: Never   • Drug use: No       Review of Systems   Constitutional: Negative for chills and fever. HENT: Positive for sore throat. Negative for congestion and ear pain. Eyes: Negative for pain and visual disturbance. Respiratory: Positive for cough. Negative for shortness of breath and wheezing. Cardiovascular: Positive for chest pain. Negative for leg swelling. Gastrointestinal: Negative for abdominal pain, diarrhea, nausea and vomiting. Genitourinary: Negative for dysuria, frequency, hematuria and urgency. Musculoskeletal: Negative for neck pain and neck stiffness. Skin: Negative for rash and wound. Neurological: Negative for weakness, numbness and headaches. Psychiatric/Behavioral: Negative for agitation and confusion. All other systems reviewed and are negative. Physical Exam  Physical Exam  Vitals and nursing note reviewed. Constitutional:       Appearance: She is well-developed. HENT:      Head: Normocephalic and atraumatic. Eyes:      Pupils: Pupils are equal, round, and reactive to light. Cardiovascular:      Rate and Rhythm: Normal rate and regular rhythm. Pulmonary:      Effort: Pulmonary effort is normal.      Breath sounds: Normal breath sounds. Abdominal:      General: Bowel sounds are normal.      Palpations: Abdomen is soft. Musculoskeletal:         General: Normal range of motion. Cervical back: Normal range of motion and neck supple. Skin:     General: Skin is warm and dry. Neurological:      General: No focal deficit present. Mental Status: She is alert and oriented to person, place, and time.       Comments: No focal deficits         Vital Signs  ED Triage Vitals [09/26/23 1006]   Temperature Pulse Respirations Blood Pressure SpO2   98.1 °F (36.7 °C) 81 16 148/74 99 %      Temp src Heart Rate Source Patient Position - Orthostatic VS BP Location FiO2 (%)   -- -- -- -- --      Pain Score       --           Vitals:    09/26/23 1006   BP: 148/74   Pulse: 81         Visual Acuity      ED Medications  Medications - No data to display    Diagnostic Studies  Results Reviewed     Procedure Component Value Units Date/Time    Urine Microscopic [[id number]]  (Abnormal) Collected: 09/26/23 1317    Lab Status: Final result Specimen: Urine, Clean Catch Updated: 09/26/23 1347     RBC, UA 1-2 /hpf      WBC, UA 4-10 /hpf      Epithelial Cells Moderate /hpf      Bacteria, UA Occasional /hpf     UA w Reflex to Microscopic w Reflex to Culture [[id number]]  (Abnormal) Collected: 09/26/23 1317    Lab Status: Final result Specimen: Urine, Clean Catch Updated: 09/26/23 1344     Color, UA Light Yellow     Clarity, UA Turbid     Specific Gravity, UA 1.014     pH, UA 5.5     Leukocytes, UA Small     Nitrite, UA Negative     Protein, UA Negative mg/dl      Glucose, UA Negative mg/dl      Ketones, UA Negative mg/dl      Urobilinogen, UA <2.0 mg/dl      Bilirubin, UA Negative     Occult Blood, UA Negative    FLU/RSV/COVID - if FLU/RSV clinically relevant [[id number]]  (Normal) Collected: 09/26/23 1214    Lab Status: Final result Specimen: Nares from Nose Updated: 09/26/23 1325     SARS-CoV-2 Negative     INFLUENZA A PCR Negative     INFLUENZA B PCR Negative     RSV PCR Negative    Narrative:      FOR PEDIATRIC PATIENTS - copy/paste COVID Guidelines URL to browser: [email or web address]    SARS-CoV-2 assay is a Nucleic Acid Amplification assay intended for the  qualitative detection of nucleic acid from SARS-CoV-2 in nasopharyngeal  swabs. Results are for the presumptive identification of SARS-CoV-2 RNA.     Positive results are indicative of infection with SARS-CoV-2, the virus  causing COVID-19, but do not rule out bacterial infection or co-infection  with other viruses. Laboratories within the [facility] and its  territories are required to report all positive results to the appropriate  public health authorities. Negative results do not preclude SARS-CoV-2  infection and should not be used as the sole basis for treatment or other  patient management decisions. Negative results must be combined with  clinical observations, patient history, and epidemiological information. This test has not been FDA cleared or approved. This test has been authorized by FDA under an Emergency Use Authorization  (EUA). This test is only authorized for the duration of time the  declaration that circumstances exist justifying the authorization of the  emergency use of an in vitro diagnostic tests for detection of SARS-CoV-2  virus and/or diagnosis of COVID-19 infection under section 564(b)(1) of  the Act, 21 U. S.C. 652MAJ-0(A)(7), unless the authorization is terminated  or revoked sooner. The test has been validated but independent review by FDA  and CLIA is pending. Test performed using [employer] GeneXpert: This RT-PCR assay targets N2,  a region unique to SARS-CoV-2. A conserved region in the E-gene was chosen  for pan-Sarbecovirus detection which includes SARS-CoV-2. According to CMS-2020-01-R, this platform meets the definition of high-throughput technology. XR chest 2 views   Final Result by Eric Burnett MD (09/26 1208)      No acute cardiopulmonary disease.                   Workstation performed: [id number]                    Procedures  ECG 12 Lead Documentation Only    Date/Time: 9/26/2023 4:09 PM    Performed by: Be Atwood DO  Authorized by: Be Atwood DO    Indications / Diagnosis:  Chest pain   Patient location:  ED  Rate:     ECG rate:  78    ECG rate assessment: normal    Rhythm:     Rhythm: sinus rhythm    Ectopy:     Ectopy: none    QRS:     QRS axis:  Normal    QRS intervals:  Normal  ST segments:     ST segments: Normal  T waves:     T waves: normal               ED Course  ED Course as of 09/26/23 1611   Tue Sep 26, 2023   1357 Epithelial Cells(!): Moderate                                             Medical Decision Making  34 y/o female with cough, body aches, congestion, and cp with cough - will get covid/flu, cxr, and ekg. Viral URI with cough: acute illness or injury  Amount and/or Complexity of Data Reviewed  Labs:  Decision-making details documented in ED Course. Radiology: ordered. Disposition  Final diagnoses:   Viral URI with cough     Time reflects when diagnosis was documented in both MDM as applicable and the Disposition within this note     Time User Action Codes Description Comment    9/26/2023  1:57 PM Siobhan PENNY Add [J06.9] Viral URI with cough       ED Disposition     ED Disposition   Discharge    Condition   Stable    Date/Time   Tue Sep 26, 2023  1:57 PM    [facility] discharge to home/self care.                Follow-up Information     Follow up With Specialties Details Why Contact Info Additional Information    Damián Nieto MD Family Medicine Call in 1 day for follow up within 2-3 days [address] [facility] Emergency Department Emergency Medicine Go to  immediately for any new or worsening symptoms [address] [facility] Emergency Department, [place], [place], [address]          Discharge Medication List as of 9/26/2023  1:58 PM      CONTINUE these medications which have NOT CHANGED    Details   B Complex-C-Folic Acid (Cici-Ryan Rx) 1 MG TABS TAKE 1 TABLET BY MOUTH DAILY WITH BREAKFAST, Normal      baclofen 10 mg tablet Take 1 tablet (10 mg total) by mouth 3 (three) times a day, Starting Mon 2/28/2022, Normal      EPINEPHrine (EpiPen 2-[place]) 0.3 mg/0.3 mL SOAJ Inject 0.3 mL (0.3 mg total) into a muscle once for 1 dose, Starting Wed 8/11/2021, Normal      ferrous sulfate 324 (65 Fe) mg TAKE 1 TABLET (324 MG TOTAL) BY MOUTH DAILY BEFORE BREAKFAST, Starting Tue 11/8/2022, Normal      levETIRAcetam (Keppra) 500 mg tablet Take 3 tablets (1,500 mg total) by mouth every 12 (twelve) hours, Starting Tue 8/29/2023, Until Thu 9/28/2023, Normal      lisinopril (ZESTRIL) 5 mg tablet TAKE 1 TABLET (5 MG TOTAL) BY MOUTH DAILY, Starting Tue 3/7/2023, Normal      perphenazine 8 mg tablet Take 8 mg by mouth daily at bedtime, Starting Thu 8/5/2021, Historical Med      sertraline (ZOLOFT) 50 mg tablet TAKE 1 TABLET (50 MG TOTAL) BY MOUTH DAILY, Starting Mon 7/17/2023, Normal      ziprasidone (GEODON) 80 mg capsule Take 80 mg by mouth daily at bedtime, Starting Thu 8/5/2021, Historical Med             No discharge procedures on file.     PDMP Review     None          ED Provider  Electronically Signed by           Zeina Muñoz DO  09/26/23 8706

## 2024-04-24 ENCOUNTER — VBI (OUTPATIENT)
Dept: ADMINISTRATIVE | Facility: OTHER | Age: 29
End: 2024-04-24

## 2024-05-17 ENCOUNTER — HOSPITAL ENCOUNTER (EMERGENCY)
Facility: HOSPITAL | Age: 29
Discharge: HOME | End: 2024-05-17
Attending: EMERGENCY MEDICINE
Payer: COMMERCIAL

## 2024-05-17 ENCOUNTER — APPOINTMENT (EMERGENCY)
Dept: RADIOLOGY | Facility: HOSPITAL | Age: 29
End: 2024-05-17
Payer: COMMERCIAL

## 2024-05-17 VITALS
TEMPERATURE: 98.3 F | DIASTOLIC BLOOD PRESSURE: 80 MMHG | OXYGEN SATURATION: 99 % | SYSTOLIC BLOOD PRESSURE: 141 MMHG | HEART RATE: 98 BPM | WEIGHT: 293 LBS | RESPIRATION RATE: 18 BRPM | HEIGHT: 63 IN | BODY MASS INDEX: 51.91 KG/M2

## 2024-05-17 DIAGNOSIS — S60.222A CONTUSION OF LEFT HAND, INITIAL ENCOUNTER: ICD-10-CM

## 2024-05-17 DIAGNOSIS — M25.532 LEFT WRIST PAIN: Primary | ICD-10-CM

## 2024-05-17 PROCEDURE — 99284 EMERGENCY DEPT VISIT MOD MDM: CPT | Performed by: EMERGENCY MEDICINE

## 2024-05-17 PROCEDURE — 96372 THER/PROPH/DIAG INJ SC/IM: CPT

## 2024-05-17 PROCEDURE — 73130 X-RAY EXAM OF HAND: CPT

## 2024-05-17 PROCEDURE — 99283 EMERGENCY DEPT VISIT LOW MDM: CPT

## 2024-05-17 PROCEDURE — 73110 X-RAY EXAM OF WRIST: CPT

## 2024-05-17 RX ORDER — NAPROXEN 250 MG/1
250 TABLET ORAL 2 TIMES DAILY WITH MEALS
Qty: 20 TABLET | Refills: 0 | Status: SHIPPED | OUTPATIENT
Start: 2024-05-17 | End: 2024-05-17 | Stop reason: ALTCHOICE

## 2024-05-17 RX ORDER — KETOROLAC TROMETHAMINE 30 MG/ML
30 INJECTION, SOLUTION INTRAMUSCULAR; INTRAVENOUS ONCE
Status: COMPLETED | OUTPATIENT
Start: 2024-05-17 | End: 2024-05-17

## 2024-05-17 RX ORDER — OXYCODONE HYDROCHLORIDE AND ACETAMINOPHEN 5; 325 MG/1; MG/1
1 TABLET ORAL ONCE
Status: COMPLETED | OUTPATIENT
Start: 2024-05-17 | End: 2024-05-17

## 2024-05-17 RX ADMIN — KETOROLAC TROMETHAMINE 30 MG: 30 INJECTION, SOLUTION INTRAMUSCULAR; INTRAVENOUS at 15:36

## 2024-05-17 RX ADMIN — OXYCODONE HYDROCHLORIDE AND ACETAMINOPHEN 1 TABLET: 5; 325 TABLET ORAL at 15:36

## 2024-05-17 NOTE — ED PROVIDER NOTES
Pt Name: Kristin Alonzo  MRN: [id number]  Birthdate 1995  Age/Sex: 28 y.o. female  Date of evaluation: 2024  PCP: Vikas Gonzales MD    CHIEF COMPLAINT    Chief Complaint   Patient presents with    Hand Injury     Pt dropped a washer on her left hand earlier today. Pt unable to move fingers in triage, pt states she can't feel her fingers well. Pt fingers and cold to the touch with a slow cap refill.          HPI    28 y.o. female presenting with left hand and wrist pain.  Patient was carrying a clothes dryer with her significant other, the dryer slipped, falling slight distance and pinching her hand and wrist between the dryer and the ground.  Patient notes severe pain to the left wrist and hand, dull, radiating throughout the hand and wrist, worse with moving the hand or wrist and better at rest.  Patient also complains of a feeling of diminished sensation at the painful area.  She denies any other pain or injuries, fevers, other symptoms.      HPI      Past Medical and Surgical History    Past Medical History:   Diagnosis Date    Anxiety     takes Zoloft; stopped during pregnancy     Bipolar affective disorder (HCC) 2017    Cardiac asystole (HCC)     Chronic hypertension in obstetric context in third trimester 2018    Chronic hypertension with superimposed preeclampsia 2017    Depression     DVT (deep venous thrombosis) (HCC) 2016    LLE-post cholecystectomy    Intrauterine growth restriction affecting care of mother, antepartum, third trimester, fetus 1     wi9th prior preg    Migraine     Multiple gestation     Right hemiparesis (HCC)     stroke in utero    Seizures (HCC)     in 2016    Severe preeclampsia, third trimester     Spontaneous      seven times    Varicella     in childhood        Past Surgical History:   Procedure Laterality Date     SECTION      x3    CHOLECYSTECTOMY      NE  DELIVERY ONLY N/A 2017    2015 daughter 27 weeks, 2016 son,  2017 twin son and daughter, 30 weeks    CT  DELIVERY ONLY N/A 2018    Procedure:  SECTION () REPEAT;  Surgeon: Vasu Ayon MD;  [facility]: [facility] ;  Service: Obstetrics    CT LIG/TRNSXJ FALOPIAN TUBE  DEL/ABDML SURG Bilateral 2018    Procedure: LIGATION/COAGULATION TUBAL;  Surgeon: Vasu Ayon MD;  Location: [facility] ;  Service: Obstetrics       Family History   Problem Relation Age of Onset    Pulmonary embolism Mother     Deep vein thrombosis Mother     Depression Mother     Hypertension Mother     Thyroid disease Mother     Mental illness Mother         bipolar    Diabetes Maternal Grandmother     Deep vein thrombosis Father     Pulmonary embolism Father     Depression Sister     Heart disease Sister     Mental illness Sister         bipolar schizo       Social History     Tobacco Use    Smoking status: Former    Smokeless tobacco: Never   Substance Use Topics    Alcohol use: Never    Drug use: No           Allergies    Allergies   Allergen Reactions    Bee Venom Anaphylaxis    Other Anaphylaxis and Swelling     Honey     Penicillins Anaphylaxis and Swelling    Seroquel [Quetiapine] Throat Swelling    Influenza Virus Vaccine Rash       Home Medications    Prior to Admission medications    Medication Sig Start Date End Date Taking? Authorizing Provider   B Complex-C-Folic Acid (Cici-Ryan Rx) 1 MG TABS TAKE 1 TABLET BY MOUTH DAILY WITH BREAKFAST 3/31/21   DOV Cohen   baclofen 10 mg tablet Take 1 tablet (10 mg total) by mouth 3 (three) times a day 22   Vikas Gonzales MD   EPINEPHrine (EpiPen 2-[place]) 0.3 mg/0.3 mL SOAJ Inject 0.3 mL (0.3 mg total) into a muscle once for 1 dose  Patient not taking: Reported on 2022  Vikas Gonzales MD   ferrous sulfate 324 (65 Fe) mg TAKE 1 TABLET (324 MG TOTAL) BY MOUTH DAILY BEFORE BREAKFAST 22   Vikas Gonzales MD   levETIRAcetam (Keppra) 500 mg tablet Take 3 tablets (1,500 mg total) by  mouth every 12 (twelve) hours 8/29/23 9/28/23  Jason Saunders DO   lisinopril (ZESTRIL) 5 mg tablet TAKE 1 TABLET (5 MG TOTAL) BY MOUTH DAILY 3/7/23   DOV Cohen   perphenazine 8 mg tablet Take 8 mg by mouth daily at bedtime 8/5/21   Historical Provider, MD   sertraline (ZOLOFT) 50 mg tablet TAKE 1 TABLET (50 MG TOTAL) BY MOUTH DAILY 7/17/23   Vikas Gonzales MD   ziprasidone (GEODON) 80 mg capsule Take 80 mg by mouth daily at bedtime 8/5/21   Historical Provider, MD           Review of Systems    Review of Systems   Constitutional:  Negative for activity change, chills and fever.   HENT:  Negative for drooling and facial swelling.    Eyes:  Negative for pain, discharge and visual disturbance.   Respiratory:  Negative for apnea, cough, chest tightness, shortness of breath and wheezing.    Cardiovascular:  Negative for chest pain and leg swelling.   Gastrointestinal:  Negative for abdominal pain, constipation, diarrhea, nausea and vomiting.   Genitourinary:  Negative for difficulty urinating, dysuria and urgency.   Musculoskeletal:  Positive for arthralgias. Negative for back pain and gait problem.   Skin:  Negative for color change and rash.   Neurological:  Negative for dizziness, speech difficulty, weakness and headaches.   Psychiatric/Behavioral:  Negative for agitation, behavioral problems and confusion.            All other systems reviewed and negative.    Physical Exam      ED Triage Vitals [05/17/24 1348]   Temperature Pulse Respirations Blood Pressure SpO2   98.3 °F (36.8 °C) 98 18 141/80 99 %      Temp Source Heart Rate Source Patient Position - Orthostatic VS BP Location FiO2 (%)   Temporal Monitor Sitting Left arm --      Pain Score       10 - Worst Possible Pain               Physical Exam  Vitals and nursing note reviewed.   Constitutional:       General: She is not in acute distress.     Appearance: She is well-developed. She is not ill-appearing, toxic-appearing or diaphoretic.    HENT:      Head: Normocephalic and atraumatic.      Right Ear: External ear normal.      Left Ear: External ear normal.      Nose: Nose normal. No congestion or rhinorrhea.      Mouth/Throat:      Mouth: Mucous membranes are moist.      Pharynx: Oropharynx is clear. No oropharyngeal exudate or posterior oropharyngeal erythema.   Eyes:      Conjunctiva/sclera: Conjunctivae normal.      Pupils: Pupils are equal, round, and reactive to light.   Cardiovascular:      Rate and Rhythm: Normal rate and regular rhythm.      Pulses: Normal pulses.      Heart sounds: Normal heart sounds.   Pulmonary:      Effort: Pulmonary effort is normal. No respiratory distress.      Breath sounds: Normal breath sounds. No wheezing or rales.   Abdominal:      General: There is no distension.      Palpations: Abdomen is soft.      Tenderness: There is no abdominal tenderness. There is no guarding or rebound.   Musculoskeletal:         General: Tenderness present. No swelling or deformity. Normal range of motion.      Cervical back: Normal range of motion and neck supple. No rigidity or tenderness.      Comments: No significant swelling bruising or deformity noted the left wrist or hand, patient is endorsing tenderness to palpation overlying the distal radius and ulna as well as throughout the entire hand.  Patient initially unwilling to move hand, however when asked she was able to move each of the fingers of the hand although that did produce some pain.  Cap refill brisk and less than 2 seconds in all fingers.  Strength sensation and pulses normal throughout that extremity.   Skin:     General: Skin is warm and dry.      Capillary Refill: Capillary refill takes less than 2 seconds.      Findings: No erythema or rash.   Neurological:      Mental Status: She is alert and oriented to person, place, and time.   Psychiatric:         Behavior: Behavior normal.         Thought Content: Thought content normal.         Judgment: Judgment normal.               Diagnostic Results      Labs:    Results Reviewed       None            All labs reviewed and utilized in the medical decision making process    Radiology:    XR hand 3+ views LEFT   ED Interpretation   Questionable minimally displaced fracture of the distal radius seen on 1 view versus artifact      Final Result      No acute osseous abnormality.      Workstation performed: [id number]         XR wrist 3+ views LEFT   Final Result      No acute osseous abnormality.            Workstation performed: [id number]             All radiology studies independently viewed by me and interpreted by the radiologist.    Procedure    Procedures        ED Course of Care and Re-Assessments      Given Percocet and Toradol with substantial improvement of pain.  Plain films overall reassuring, some concern for possible minimally displaced fracture of the distal radius versus artifact on 1 view, discussed with patient, placed in wrist brace by tech.    Medications   oxyCODONE-acetaminophen (PERCOCET) 5-325 mg per tablet 1 tablet (1 tablet Oral Given 5/17/24 1536)   ketorolac (TORADOL) injection 30 mg (30 mg Intramuscular Given 5/17/24 1536)           FINAL IMPRESSION    Final diagnoses:   Left wrist pain   Contusion of left hand, initial encounter         DISPOSITION/PLAN    Presentation as above with left wrist pain and contusion of the left hand status post blunt injury.  Vital signs reassuring, examination likewise reassuring with patient endorsing tenderness to palpation but no deformity noted.  No evidence of open fracture, unstable fracture dislocation, critical neurovascular disruption, compartment syndrome, septic arthritis, other acute threat to life or limb.  Treated symptomatically, discharged with strict return precautions, follow-up primary care doctor as well as orthopedics if symptoms do not improve over the next 48 to 72 hours  Time reflects when diagnosis was documented in both MDM as applicable and the  Disposition within this note       Time User Action Codes Description Comment    5/17/2024  3:50 PM Darryl Palacio Add [M25.532] Left wrist pain     5/17/2024  3:50 PM Darryl Palacio Add [S60.222A] Contusion of left hand, initial encounter           ED Disposition       ED Disposition   Discharge    Condition   Stable    Date/Time   Fri May 17, 2024  3:48 PM    Comment   Kristin Alonzo discharge to home/self care.                   Follow-up Information       Follow up With Specialties Details Why Contact Info Additional Information    [facility] Emergency Medicine Go to  If symptoms worsen [address] [address]  [phone] [facility], [address] [place], [place], [address]    Vkias Gonzales MD Family Medicine Call  As needed [address] [place] [address] 2  [facility] [address]  [phone]       [facility] Call in 3 days To discuss this visit and schedule close follow-up [address] [facility] [address]  [facility] [address]  [phone] [facility] [place] [address], [place], [place], [address] [phone]              PATIENT REFERRED TO:    [facility]  [address] [id number] [phone]  Go to   If symptoms worsen    Vikas Gonzales MD  [address] [place] [address] 2  [facility] [address]  [phone]    Call   As needed    [facility] [address]  [facility] [address]  [phone]  Call in 3 days  To discuss this visit and schedule close follow-up      DISCHARGE MEDICATIONS:    Discharge Medication List as of 5/17/2024  3:51 PM        START taking these medications    Details   Diclofenac Sodium (VOLTAREN) 1 %  "Apply 2 g topically 4 (four) times a day, Starting Fri 5/17/2024, Print           CONTINUE these medications which have NOT CHANGED    Details   B Complex-C-Folic Acid (Cici-Ryan Rx) 1 MG TABS TAKE 1 TABLET BY MOUTH DAILY WITH BREAKFAST, Normal      baclofen 10 mg tablet Take 1 tablet (10 mg total) by mouth 3 (three) times a day, Starting Mon 2/28/2022, Normal      EPINEPHrine (EpiPen 2-[place]) 0.3 mg/0.3 mL SOAJ Inject 0.3 mL (0.3 mg total) into a muscle once for 1 dose, Starting Wed 8/11/2021, Normal      ferrous sulfate 324 (65 Fe) mg TAKE 1 TABLET (324 MG TOTAL) BY MOUTH DAILY BEFORE BREAKFAST, Starting Tue 11/8/2022, Normal      levETIRAcetam (Keppra) 500 mg tablet Take 3 tablets (1,500 mg total) by mouth every 12 (twelve) hours, Starting Tue 8/29/2023, Until Thu 9/28/2023, Normal      lisinopril (ZESTRIL) 5 mg tablet TAKE 1 TABLET (5 MG TOTAL) BY MOUTH DAILY, Starting Tue 3/7/2023, Normal      perphenazine 8 mg tablet Take 8 mg by mouth daily at bedtime, Starting Thu 8/5/2021, Historical Med      sertraline (ZOLOFT) 50 mg tablet TAKE 1 TABLET (50 MG TOTAL) BY MOUTH DAILY, Starting Mon 7/17/2023, Normal      ziprasidone (GEODON) 80 mg capsule Take 80 mg by mouth daily at bedtime, Starting Thu 8/5/2021, Historical Med             No discharge procedures on file.         Darryl Palacio MD    Portions of the record may have been created with voice recognition software.  Occasional wrong word or \"sound alike\" substitutions may have occurred due to the inherent limitations of voice recognition software.  Please read the chart carefully and recognize, using context, where substitutions have occurred     Darryl Palacio MD  05/17/24 7066    "

## 2025-03-18 ENCOUNTER — VBI (OUTPATIENT)
Dept: ADMINISTRATIVE | Facility: OTHER | Age: 30
End: 2025-03-18

## 2025-03-18 NOTE — TELEPHONE ENCOUNTER
03/18/25 8:51 AM     Chart reviewed for Pap Smear (HPV) aka Cervical Cancer Screening ; nothing is submitted to the patient's insurance at this time.     Elidia Allan   [facility]

## (undated) DEVICE — SUT VICRYL 0 CT-1 36 IN J946H

## (undated) DEVICE — CHLORAPREP HI-LITE 26ML ORANGE

## (undated) DEVICE — Device

## (undated) DEVICE — SUT MONOCRYL 0 CT-1 236 IN Y346H

## (undated) DEVICE — PACK C-SECTION PBDS

## (undated) DEVICE — GLOVE SRG BIOGEL ECLIPSE 8

## (undated) DEVICE — SUT MONOCRYL 4-0 PS-2 27 IN Y426H

## (undated) DEVICE — SKIN MARKER DUAL TIP WITH RULER CAP, FLEXIBLE RULER AND LABELS: Brand: DEVON

## (undated) DEVICE — GLOVE SRG BIOGEL ECLIPSE 7

## (undated) DEVICE — GLOVE INDICATOR PI UNDERGLOVE SZ 8 BLUE

## (undated) DEVICE — SUT VICRYL 0 CTX 36 IN J978H

## (undated) DEVICE — SUT MONOCRYL 4-0 PS-2 18 IN Y496G

## (undated) DEVICE — GLOVE INDICATOR PI UNDERGLOVE SZ 7.5 BLUE

## (undated) DEVICE — SUT PLAIN 2-0 CTX 27 IN 872H

## (undated) DEVICE — MEDI-VAC YANKAUER SUCTION HANDLE W/STRAIGHT TIP & CONTROL VENT: Brand: CARDINAL HEALTH

## (undated) DEVICE — ADHESIVE SKN CLSR HISTOACRYL FLEX 0.5ML LF